# Patient Record
Sex: MALE | Race: WHITE | NOT HISPANIC OR LATINO | Employment: OTHER | URBAN - METROPOLITAN AREA
[De-identification: names, ages, dates, MRNs, and addresses within clinical notes are randomized per-mention and may not be internally consistent; named-entity substitution may affect disease eponyms.]

---

## 2018-01-01 ENCOUNTER — CLINICAL SUPPORT (OUTPATIENT)
Dept: RADIATION ONCOLOGY | Facility: CLINIC | Age: 63
End: 2018-01-01

## 2018-01-01 ENCOUNTER — RADIATION ONCOLOGY CONSULT (OUTPATIENT)
Dept: RADIATION ONCOLOGY | Facility: CLINIC | Age: 63
End: 2018-01-01
Attending: RADIOLOGY
Payer: COMMERCIAL

## 2018-01-01 ENCOUNTER — ANESTHESIA (INPATIENT)
Dept: GASTROENTEROLOGY | Facility: HOSPITAL | Age: 63
DRG: 392 | End: 2018-01-01
Payer: COMMERCIAL

## 2018-01-01 ENCOUNTER — HOSPITAL ENCOUNTER (OUTPATIENT)
Dept: RADIOLOGY | Facility: HOSPITAL | Age: 63
Discharge: HOME | End: 2018-12-17
Attending: RADIOLOGY

## 2018-01-01 ENCOUNTER — HOSPITAL ENCOUNTER (OUTPATIENT)
Dept: RADIOLOGY | Facility: HOSPITAL | Age: 63
Discharge: HOME | End: 2018-12-24
Attending: OTOLARYNGOLOGY
Payer: COMMERCIAL

## 2018-01-01 ENCOUNTER — TRANSCRIBE ORDERS (OUTPATIENT)
Dept: ADMINISTRATIVE | Facility: HOSPITAL | Age: 63
End: 2018-01-01

## 2018-01-01 ENCOUNTER — TELEPHONE (OUTPATIENT)
Dept: GASTROENTEROLOGY | Facility: CLINIC | Age: 63
End: 2018-01-01

## 2018-01-01 ENCOUNTER — ANESTHESIA EVENT (INPATIENT)
Dept: GASTROENTEROLOGY | Facility: HOSPITAL | Age: 63
DRG: 392 | End: 2018-01-01
Payer: COMMERCIAL

## 2018-01-01 ENCOUNTER — HOSPITAL ENCOUNTER (INPATIENT)
Facility: HOSPITAL | Age: 63
LOS: 3 days | Discharge: HOME | DRG: 392 | End: 2018-12-30
Attending: EMERGENCY MEDICINE | Admitting: INTERNAL MEDICINE
Payer: COMMERCIAL

## 2018-01-01 ENCOUNTER — HOSPITAL ENCOUNTER (OUTPATIENT)
Dept: RADIOLOGY | Facility: HOSPITAL | Age: 63
Discharge: HOME | End: 2018-12-24
Payer: COMMERCIAL

## 2018-01-01 ENCOUNTER — HOSPITAL ENCOUNTER (OUTPATIENT)
Dept: RADIOLOGY | Facility: HOSPITAL | Age: 63
Discharge: HOME | End: 2018-12-24
Admitting: RADIOLOGY
Payer: COMMERCIAL

## 2018-01-01 VITALS
WEIGHT: 251 LBS | TEMPERATURE: 98.1 F | DIASTOLIC BLOOD PRESSURE: 84 MMHG | SYSTOLIC BLOOD PRESSURE: 132 MMHG | HEIGHT: 70 IN | RESPIRATION RATE: 18 BRPM | OXYGEN SATURATION: 98 % | BODY MASS INDEX: 35.93 KG/M2 | HEART RATE: 74 BPM

## 2018-01-01 VITALS
SYSTOLIC BLOOD PRESSURE: 168 MMHG | HEIGHT: 70 IN | WEIGHT: 252.6 LBS | HEART RATE: 80 BPM | BODY MASS INDEX: 36.16 KG/M2 | RESPIRATION RATE: 18 BRPM | DIASTOLIC BLOOD PRESSURE: 79 MMHG | TEMPERATURE: 97.4 F | OXYGEN SATURATION: 98 %

## 2018-01-01 DIAGNOSIS — C73 PRIMARY CANCER OF THYROID WITH METASTASIS TO OTHER SITE (HCC): Primary | ICD-10-CM

## 2018-01-01 DIAGNOSIS — E04.2 NONTOXIC MULTINODULAR GOITER: ICD-10-CM

## 2018-01-01 DIAGNOSIS — R13.19 OTHER DYSPHAGIA: ICD-10-CM

## 2018-01-01 DIAGNOSIS — R13.10 DIFFICULTY IN SWALLOWING: ICD-10-CM

## 2018-01-01 DIAGNOSIS — E04.1 LEFT THYROID NODULE: ICD-10-CM

## 2018-01-01 DIAGNOSIS — E11.9 TYPE 2 DIABETES MELLITUS WITHOUT COMPLICATION, WITHOUT LONG-TERM CURRENT USE OF INSULIN (HCC): Chronic | ICD-10-CM

## 2018-01-01 DIAGNOSIS — E11.9 DIABETES (HCC): ICD-10-CM

## 2018-01-01 DIAGNOSIS — C73 THYROID CANCER (HCC): Primary | ICD-10-CM

## 2018-01-01 DIAGNOSIS — R59.0 BILATERAL HILAR ADENOPATHY SYNDROME: ICD-10-CM

## 2018-01-01 DIAGNOSIS — I10 HYPERTENSION: ICD-10-CM

## 2018-01-01 DIAGNOSIS — R59.0 ENLARGED SUBMENTAL LYMPH NODE: ICD-10-CM

## 2018-01-01 DIAGNOSIS — Z71.2 PERSON CONSULTING FOR EXPLANATION OF EXAMINATION OR TEST FINDING: ICD-10-CM

## 2018-01-01 DIAGNOSIS — I10 ESSENTIAL HYPERTENSION: Chronic | ICD-10-CM

## 2018-01-01 DIAGNOSIS — R13.12 OROPHARYNGEAL DYSPHAGIA: ICD-10-CM

## 2018-01-01 DIAGNOSIS — R59.0 BILATERAL HILAR ADENOPATHY SYNDROME: Primary | ICD-10-CM

## 2018-01-01 LAB
ABO GROUP BLD: NORMAL
ALBUMIN SERPL BCP-MCNC: 3.4 G/DL (ref 3.5–5)
ALBUMIN SERPL BCP-MCNC: 4.1 G/DL (ref 3.5–5)
ALP SERPL-CCNC: 104 U/L (ref 46–116)
ALP SERPL-CCNC: 91 U/L (ref 46–116)
ALT SERPL W P-5'-P-CCNC: 13 U/L (ref 12–78)
ALT SERPL W P-5'-P-CCNC: 18 U/L (ref 12–78)
ANION GAP SERPL CALCULATED.3IONS-SCNC: 10 MMOL/L (ref 4–13)
ANION GAP SERPL CALCULATED.3IONS-SCNC: 11 MMOL/L (ref 4–13)
ANION GAP SERPL CALCULATED.3IONS-SCNC: 12 MMOL/L (ref 4–13)
ANION GAP SERPL CALCULATED.3IONS-SCNC: 12 MMOL/L (ref 4–13)
AST SERPL W P-5'-P-CCNC: 14 U/L (ref 5–45)
AST SERPL W P-5'-P-CCNC: 18 U/L (ref 5–45)
BASOPHILS # BLD AUTO: 0.04 THOUSANDS/ΜL (ref 0–0.1)
BASOPHILS # BLD AUTO: 0.04 THOUSANDS/ΜL (ref 0–0.1)
BASOPHILS # BLD AUTO: 0.05 THOUSANDS/ΜL (ref 0–0.1)
BASOPHILS NFR BLD AUTO: 1 % (ref 0–1)
BILIRUB SERPL-MCNC: 0.88 MG/DL (ref 0.2–1)
BILIRUB SERPL-MCNC: 1.2 MG/DL (ref 0.2–1)
BLD GP AB SCN SERPL QL: NEGATIVE
BUN SERPL-MCNC: 11 MG/DL (ref 5–25)
BUN SERPL-MCNC: 12 MG/DL (ref 5–25)
BUN SERPL-MCNC: 12 MG/DL (ref 5–25)
BUN SERPL-MCNC: 14 MG/DL (ref 5–25)
CALCIUM SERPL-MCNC: 8.5 MG/DL (ref 8.3–10.1)
CALCIUM SERPL-MCNC: 8.6 MG/DL (ref 8.3–10.1)
CALCIUM SERPL-MCNC: 8.7 MG/DL (ref 8.3–10.1)
CALCIUM SERPL-MCNC: 9.1 MG/DL (ref 8.3–10.1)
CHLORIDE SERPL-SCNC: 100 MMOL/L (ref 100–108)
CHLORIDE SERPL-SCNC: 95 MMOL/L (ref 100–108)
CHLORIDE SERPL-SCNC: 98 MMOL/L (ref 100–108)
CHLORIDE SERPL-SCNC: 99 MMOL/L (ref 100–108)
CO2 SERPL-SCNC: 23 MMOL/L (ref 21–32)
CO2 SERPL-SCNC: 24 MMOL/L (ref 21–32)
CO2 SERPL-SCNC: 26 MMOL/L (ref 21–32)
CO2 SERPL-SCNC: 28 MMOL/L (ref 21–32)
CREAT SERPL-MCNC: 1.32 MG/DL (ref 0.6–1.3)
CREAT SERPL-MCNC: 1.37 MG/DL (ref 0.6–1.3)
CREAT SERPL-MCNC: 1.58 MG/DL (ref 0.6–1.3)
CREAT SERPL-MCNC: 1.61 MG/DL (ref 0.6–1.3)
EOSINOPHIL # BLD AUTO: 0.17 THOUSAND/ΜL (ref 0–0.61)
EOSINOPHIL # BLD AUTO: 0.18 THOUSAND/ΜL (ref 0–0.61)
EOSINOPHIL # BLD AUTO: 0.21 THOUSAND/ΜL (ref 0–0.61)
EOSINOPHIL NFR BLD AUTO: 2 % (ref 0–6)
EOSINOPHIL NFR BLD AUTO: 2 % (ref 0–6)
EOSINOPHIL NFR BLD AUTO: 3 % (ref 0–6)
ERYTHROCYTE [DISTWIDTH] IN BLOOD BY AUTOMATED COUNT: 12.3 % (ref 11.6–15.1)
ERYTHROCYTE [DISTWIDTH] IN BLOOD BY AUTOMATED COUNT: 12.5 % (ref 11.6–15.1)
ERYTHROCYTE [DISTWIDTH] IN BLOOD BY AUTOMATED COUNT: 12.6 % (ref 11.6–15.1)
EST. AVERAGE GLUCOSE BLD GHB EST-MCNC: 203 MG/DL
GFR SERPL CREATININE-BSD FRML MDRD: 45 ML/MIN/1.73SQ M
GFR SERPL CREATININE-BSD FRML MDRD: 46 ML/MIN/1.73SQ M
GFR SERPL CREATININE-BSD FRML MDRD: 55 ML/MIN/1.73SQ M
GFR SERPL CREATININE-BSD FRML MDRD: 57 ML/MIN/1.73SQ M
GLUCOSE SERPL-MCNC: 162 MG/DL (ref 65–140)
GLUCOSE SERPL-MCNC: 170 MG/DL (ref 65–140)
GLUCOSE SERPL-MCNC: 178 MG/DL (ref 65–140)
GLUCOSE SERPL-MCNC: 207 MG/DL (ref 65–140)
GLUCOSE SERPL-MCNC: 217 MG/DL (ref 65–140)
GLUCOSE SERPL-MCNC: 224 MG/DL (ref 65–140)
GLUCOSE SERPL-MCNC: 231 MG/DL (ref 65–140)
GLUCOSE SERPL-MCNC: 232 MG/DL (ref 65–140)
GLUCOSE SERPL-MCNC: 238 MG/DL (ref 65–140)
GLUCOSE SERPL-MCNC: 239 MG/DL (ref 65–140)
GLUCOSE SERPL-MCNC: 241 MG/DL (ref 65–140)
GLUCOSE SERPL-MCNC: 247 MG/DL (ref 65–140)
GLUCOSE SERPL-MCNC: 259 MG/DL (ref 65–140)
GLUCOSE SERPL-MCNC: 262 MG/DL (ref 65–140)
GLUCOSE SERPL-MCNC: 262 MG/DL (ref 65–140)
GLUCOSE SERPL-MCNC: 273 MG/DL (ref 65–140)
HBA1C MFR BLD: 8.7 % (ref 4.2–6.3)
HCT VFR BLD AUTO: 37.8 % (ref 36.5–49.3)
HCT VFR BLD AUTO: 38.1 % (ref 36.5–49.3)
HCT VFR BLD AUTO: 38.5 % (ref 36.5–49.3)
HGB BLD-MCNC: 13.4 G/DL (ref 12–17)
HGB BLD-MCNC: 13.5 G/DL (ref 12–17)
HGB BLD-MCNC: 13.6 G/DL (ref 12–17)
IMM GRANULOCYTES # BLD AUTO: 0.04 THOUSAND/UL (ref 0–0.2)
IMM GRANULOCYTES NFR BLD AUTO: 1 % (ref 0–2)
INR PPP: 1.1 (ref 0.86–1.17)
LIPASE SERPL-CCNC: 150 U/L (ref 73–393)
LYMPHOCYTES # BLD AUTO: 1.21 THOUSANDS/ΜL (ref 0.6–4.47)
LYMPHOCYTES # BLD AUTO: 1.33 THOUSANDS/ΜL (ref 0.6–4.47)
LYMPHOCYTES # BLD AUTO: 1.58 THOUSANDS/ΜL (ref 0.6–4.47)
LYMPHOCYTES NFR BLD AUTO: 16 % (ref 14–44)
LYMPHOCYTES NFR BLD AUTO: 17 % (ref 14–44)
LYMPHOCYTES NFR BLD AUTO: 21 % (ref 14–44)
MCH RBC QN AUTO: 31.6 PG (ref 26.8–34.3)
MCH RBC QN AUTO: 31.9 PG (ref 26.8–34.3)
MCH RBC QN AUTO: 31.9 PG (ref 26.8–34.3)
MCHC RBC AUTO-ENTMCNC: 35.3 G/DL (ref 31.4–37.4)
MCHC RBC AUTO-ENTMCNC: 35.4 G/DL (ref 31.4–37.4)
MCHC RBC AUTO-ENTMCNC: 35.4 G/DL (ref 31.4–37.4)
MCV RBC AUTO: 89 FL (ref 82–98)
MCV RBC AUTO: 90 FL (ref 82–98)
MCV RBC AUTO: 90 FL (ref 82–98)
MONOCYTES # BLD AUTO: 0.69 THOUSAND/ΜL (ref 0.17–1.22)
MONOCYTES # BLD AUTO: 0.69 THOUSAND/ΜL (ref 0.17–1.22)
MONOCYTES # BLD AUTO: 0.71 THOUSAND/ΜL (ref 0.17–1.22)
MONOCYTES NFR BLD AUTO: 9 % (ref 4–12)
NEUTROPHILS # BLD AUTO: 5.1 THOUSANDS/ΜL (ref 1.85–7.62)
NEUTROPHILS # BLD AUTO: 5.43 THOUSANDS/ΜL (ref 1.85–7.62)
NEUTROPHILS # BLD AUTO: 5.56 THOUSANDS/ΜL (ref 1.85–7.62)
NEUTS SEG NFR BLD AUTO: 65 % (ref 43–75)
NEUTS SEG NFR BLD AUTO: 70 % (ref 43–75)
NEUTS SEG NFR BLD AUTO: 71 % (ref 43–75)
NRBC BLD AUTO-RTO: 0 /100 WBCS
PLATELET # BLD AUTO: 213 THOUSANDS/UL (ref 149–390)
PLATELET # BLD AUTO: 214 THOUSANDS/UL (ref 149–390)
PLATELET # BLD AUTO: 230 THOUSANDS/UL (ref 149–390)
PMV BLD AUTO: 9.1 FL (ref 8.9–12.7)
PMV BLD AUTO: 9.2 FL (ref 8.9–12.7)
PMV BLD AUTO: 9.3 FL (ref 8.9–12.7)
POTASSIUM SERPL-SCNC: 3.8 MMOL/L (ref 3.5–5.3)
POTASSIUM SERPL-SCNC: 3.9 MMOL/L (ref 3.5–5.3)
PROT SERPL-MCNC: 7.7 G/DL (ref 6.4–8.2)
PROT SERPL-MCNC: 8.3 G/DL (ref 6.4–8.2)
PROTHROMBIN TIME: 14.3 SECONDS (ref 11.8–14.2)
RBC # BLD AUTO: 4.23 MILLION/UL (ref 3.88–5.62)
RBC # BLD AUTO: 4.24 MILLION/UL (ref 3.88–5.62)
RBC # BLD AUTO: 4.27 MILLION/UL (ref 3.88–5.62)
RH BLD: POSITIVE
SCAN RESULT: NORMAL
SODIUM SERPL-SCNC: 132 MMOL/L (ref 136–145)
SODIUM SERPL-SCNC: 134 MMOL/L (ref 136–145)
SODIUM SERPL-SCNC: 134 MMOL/L (ref 136–145)
SODIUM SERPL-SCNC: 138 MMOL/L (ref 136–145)
SPECIMEN EXPIRATION DATE: NORMAL
WBC # BLD AUTO: 7.69 THOUSAND/UL (ref 4.31–10.16)
WBC # BLD AUTO: 7.7 THOUSAND/UL (ref 4.31–10.16)
WBC # BLD AUTO: 7.72 THOUSAND/UL (ref 4.31–10.16)

## 2018-01-01 PROCEDURE — 88172 CYTP DX EVAL FNA 1ST EA SITE: CPT | Performed by: PATHOLOGY

## 2018-01-01 PROCEDURE — 86900 BLOOD TYPING SEROLOGIC ABO: CPT | Performed by: EMERGENCY MEDICINE

## 2018-01-01 PROCEDURE — 88342 IMHCHEM/IMCYTCHM 1ST ANTB: CPT | Performed by: PATHOLOGY

## 2018-01-01 PROCEDURE — 80053 COMPREHEN METABOLIC PANEL: CPT | Performed by: EMERGENCY MEDICINE

## 2018-01-01 PROCEDURE — 82948 REAGENT STRIP/BLOOD GLUCOSE: CPT

## 2018-01-01 PROCEDURE — 85025 COMPLETE CBC W/AUTO DIFF WBC: CPT | Performed by: INTERNAL MEDICINE

## 2018-01-01 PROCEDURE — 83036 HEMOGLOBIN GLYCOSYLATED A1C: CPT | Performed by: INTERNAL MEDICINE

## 2018-01-01 PROCEDURE — 10022 HB FNA W/IMAGE: CPT

## 2018-01-01 PROCEDURE — 88305 TISSUE EXAM BY PATHOLOGIST: CPT | Performed by: PATHOLOGY

## 2018-01-01 PROCEDURE — 85025 COMPLETE CBC W/AUTO DIFF WBC: CPT | Performed by: EMERGENCY MEDICINE

## 2018-01-01 PROCEDURE — 88341 IMHCHEM/IMCYTCHM EA ADD ANTB: CPT | Performed by: PATHOLOGY

## 2018-01-01 PROCEDURE — 99285 EMERGENCY DEPT VISIT HI MDM: CPT

## 2018-01-01 PROCEDURE — 99232 SBSQ HOSP IP/OBS MODERATE 35: CPT | Performed by: INTERNAL MEDICINE

## 2018-01-01 PROCEDURE — 76942 ECHO GUIDE FOR BIOPSY: CPT

## 2018-01-01 PROCEDURE — 88173 CYTOPATH EVAL FNA REPORT: CPT | Performed by: PATHOLOGY

## 2018-01-01 PROCEDURE — 80048 BASIC METABOLIC PNL TOTAL CA: CPT | Performed by: INTERNAL MEDICINE

## 2018-01-01 PROCEDURE — 70491 CT SOFT TISSUE NECK W/DYE: CPT

## 2018-01-01 PROCEDURE — 86850 RBC ANTIBODY SCREEN: CPT | Performed by: EMERGENCY MEDICINE

## 2018-01-01 PROCEDURE — 99215 OFFICE O/P EST HI 40 MIN: CPT | Performed by: RADIOLOGY

## 2018-01-01 PROCEDURE — 43246 EGD PLACE GASTROSTOMY TUBE: CPT | Performed by: INTERNAL MEDICINE

## 2018-01-01 PROCEDURE — 85610 PROTHROMBIN TIME: CPT | Performed by: INTERNAL MEDICINE

## 2018-01-01 PROCEDURE — 80053 COMPREHEN METABOLIC PANEL: CPT | Performed by: INTERNAL MEDICINE

## 2018-01-01 PROCEDURE — 36415 COLL VENOUS BLD VENIPUNCTURE: CPT | Performed by: EMERGENCY MEDICINE

## 2018-01-01 PROCEDURE — 83690 ASSAY OF LIPASE: CPT | Performed by: EMERGENCY MEDICINE

## 2018-01-01 PROCEDURE — 71260 CT THORAX DX C+: CPT

## 2018-01-01 PROCEDURE — 99223 1ST HOSP IP/OBS HIGH 75: CPT | Performed by: INTERNAL MEDICINE

## 2018-01-01 PROCEDURE — 88185 FLOWCYTOMETRY/TC ADD-ON: CPT

## 2018-01-01 PROCEDURE — 88184 FLOWCYTOMETRY/ TC 1 MARKER: CPT | Performed by: SPECIALIST

## 2018-01-01 PROCEDURE — 86901 BLOOD TYPING SEROLOGIC RH(D): CPT | Performed by: EMERGENCY MEDICINE

## 2018-01-01 PROCEDURE — 99239 HOSP IP/OBS DSCHRG MGMT >30: CPT | Performed by: INTERNAL MEDICINE

## 2018-01-01 PROCEDURE — 0DH63UZ INSERTION OF FEEDING DEVICE INTO STOMACH, PERCUTANEOUS APPROACH: ICD-10-PCS | Performed by: INTERNAL MEDICINE

## 2018-01-01 RX ORDER — REPAGLINIDE 2 MG/1
2 TABLET ORAL
Qty: 90 TABLET | Refills: 0 | Status: SHIPPED | OUTPATIENT
Start: 2018-01-01 | End: 2019-01-01 | Stop reason: HOSPADM

## 2018-01-01 RX ORDER — CEFAZOLIN SODIUM 1 G/50ML
1000 SOLUTION INTRAVENOUS ONCE
Status: DISCONTINUED | OUTPATIENT
Start: 2018-01-01 | End: 2018-01-01

## 2018-01-01 RX ORDER — MORPHINE SULFATE 4 MG/ML
4 INJECTION, SOLUTION INTRAMUSCULAR; INTRAVENOUS EVERY 4 HOURS PRN
Status: DISCONTINUED | OUTPATIENT
Start: 2018-01-01 | End: 2018-01-01 | Stop reason: HOSPADM

## 2018-01-01 RX ORDER — HYDRALAZINE HYDROCHLORIDE 20 MG/ML
10 INJECTION INTRAMUSCULAR; INTRAVENOUS EVERY 6 HOURS PRN
Status: DISCONTINUED | OUTPATIENT
Start: 2018-01-01 | End: 2018-01-01 | Stop reason: HOSPADM

## 2018-01-01 RX ORDER — HEPARIN SODIUM 5000 [USP'U]/ML
5000 INJECTION, SOLUTION INTRAVENOUS; SUBCUTANEOUS EVERY 8 HOURS SCHEDULED
Status: DISCONTINUED | OUTPATIENT
Start: 2018-01-01 | End: 2018-01-01 | Stop reason: HOSPADM

## 2018-01-01 RX ORDER — OMEPRAZOLE 20 MG/1
20 CAPSULE, DELAYED RELEASE ORAL DAILY
COMMUNITY
End: 2018-01-01

## 2018-01-01 RX ORDER — LIDOCAINE HYDROCHLORIDE 10 MG/ML
2 INJECTION, SOLUTION INFILTRATION; PERINEURAL ONCE
Status: COMPLETED | OUTPATIENT
Start: 2018-01-01 | End: 2018-01-01

## 2018-01-01 RX ORDER — METOPROLOL SUCCINATE 100 MG/1
100 TABLET, EXTENDED RELEASE ORAL
COMMUNITY
End: 2018-01-01

## 2018-01-01 RX ORDER — METOPROLOL TARTRATE 50 MG/1
50 TABLET, FILM COATED ORAL EVERY 8 HOURS SCHEDULED
Status: DISCONTINUED | OUTPATIENT
Start: 2018-01-01 | End: 2018-01-01

## 2018-01-01 RX ORDER — PROPOFOL 10 MG/ML
INJECTION, EMULSION INTRAVENOUS AS NEEDED
Status: DISCONTINUED | OUTPATIENT
Start: 2018-01-01 | End: 2018-01-01 | Stop reason: SURG

## 2018-01-01 RX ORDER — INSULIN GLARGINE 100 [IU]/ML
15 INJECTION, SOLUTION SUBCUTANEOUS EVERY MORNING
Status: DISCONTINUED | OUTPATIENT
Start: 2018-01-01 | End: 2018-01-01

## 2018-01-01 RX ORDER — OMEPRAZOLE 20 MG/1
20 CAPSULE, DELAYED RELEASE ORAL DAILY
COMMUNITY
End: 2019-01-01 | Stop reason: HOSPADM

## 2018-01-01 RX ORDER — REPAGLINIDE 2 MG/1
1 TABLET ORAL
Status: DISCONTINUED | OUTPATIENT
Start: 2018-01-01 | End: 2018-01-01

## 2018-01-01 RX ORDER — CLONIDINE 0.2 MG/24H
0.2 PATCH, EXTENDED RELEASE TRANSDERMAL WEEKLY
Status: DISCONTINUED | OUTPATIENT
Start: 2018-01-01 | End: 2018-01-01

## 2018-01-01 RX ORDER — METOPROLOL TARTRATE 5 MG/5ML
5 INJECTION INTRAVENOUS ONCE
Status: COMPLETED | OUTPATIENT
Start: 2018-01-01 | End: 2018-01-01

## 2018-01-01 RX ORDER — CEFAZOLIN SODIUM 1 G/50ML
1000 SOLUTION INTRAVENOUS ONCE
Status: COMPLETED | OUTPATIENT
Start: 2018-01-01 | End: 2018-01-01

## 2018-01-01 RX ORDER — AMLODIPINE BESYLATE AND BENAZEPRIL HYDROCHLORIDE 5; 20 MG/1; MG/1
1 CAPSULE ORAL 2 TIMES DAILY
COMMUNITY
End: 2019-01-01 | Stop reason: HOSPADM

## 2018-01-01 RX ORDER — PANTOPRAZOLE SODIUM 40 MG/1
40 TABLET, DELAYED RELEASE ORAL
Status: DISCONTINUED | OUTPATIENT
Start: 2018-01-01 | End: 2018-01-01 | Stop reason: HOSPADM

## 2018-01-01 RX ORDER — METOPROLOL SUCCINATE 100 MG/1
100 TABLET, EXTENDED RELEASE ORAL 2 TIMES DAILY
COMMUNITY
End: 2019-01-01 | Stop reason: HOSPADM

## 2018-01-01 RX ORDER — ASPIRIN 81 MG/1
81 TABLET ORAL DAILY
COMMUNITY
End: 2019-01-01 | Stop reason: HOSPADM

## 2018-01-01 RX ORDER — LISINOPRIL 20 MG/1
20 TABLET ORAL DAILY
Status: DISCONTINUED | OUTPATIENT
Start: 2018-01-01 | End: 2018-01-01 | Stop reason: HOSPADM

## 2018-01-01 RX ORDER — FENTANYL CITRATE/PF 50 MCG/ML
25 SYRINGE (ML) INJECTION
Status: DISCONTINUED | OUTPATIENT
Start: 2018-01-01 | End: 2018-01-01 | Stop reason: HOSPADM

## 2018-01-01 RX ORDER — REPAGLINIDE 2 MG/1
2 TABLET ORAL
Status: DISCONTINUED | OUTPATIENT
Start: 2018-01-01 | End: 2018-01-01 | Stop reason: HOSPADM

## 2018-01-01 RX ORDER — CEFAZOLIN SODIUM 2 G/50ML
2000 SOLUTION INTRAVENOUS ONCE
Status: DISCONTINUED | OUTPATIENT
Start: 2018-01-01 | End: 2018-01-01

## 2018-01-01 RX ORDER — SODIUM CHLORIDE 9 MG/ML
125 INJECTION, SOLUTION INTRAVENOUS CONTINUOUS
Status: CANCELLED | OUTPATIENT
Start: 2018-01-01

## 2018-01-01 RX ORDER — INSULIN GLARGINE 100 [IU]/ML
25 INJECTION, SOLUTION SUBCUTANEOUS EVERY MORNING
Status: DISCONTINUED | OUTPATIENT
Start: 2018-01-01 | End: 2018-01-01 | Stop reason: HOSPADM

## 2018-01-01 RX ORDER — CLONIDINE 0.3 MG/24H
0.3 PATCH, EXTENDED RELEASE TRANSDERMAL WEEKLY
Status: DISCONTINUED | OUTPATIENT
Start: 2018-01-01 | End: 2018-01-01 | Stop reason: HOSPADM

## 2018-01-01 RX ORDER — CHOLECALCIFEROL (VITAMIN D3) 125 MCG
500 CAPSULE ORAL DAILY
COMMUNITY
End: 2018-01-01

## 2018-01-01 RX ORDER — INSULIN GLARGINE 100 [IU]/ML
25 INJECTION, SOLUTION SUBCUTANEOUS EVERY MORNING
Qty: 10 ML | Refills: 0 | Status: SHIPPED | OUTPATIENT
Start: 2018-01-01 | End: 2019-01-01 | Stop reason: HOSPADM

## 2018-01-01 RX ORDER — CHOLECALCIFEROL (VITAMIN D3) 125 MCG
500 CAPSULE ORAL DAILY
COMMUNITY
End: 2019-01-01 | Stop reason: HOSPADM

## 2018-01-01 RX ORDER — INSULIN GLARGINE 100 [IU]/ML
20 INJECTION, SOLUTION SUBCUTANEOUS EVERY MORNING
Status: DISCONTINUED | OUTPATIENT
Start: 2018-01-01 | End: 2018-01-01

## 2018-01-01 RX ORDER — METOPROLOL TARTRATE 100 MG/1
100 TABLET ORAL EVERY 12 HOURS SCHEDULED
Status: DISCONTINUED | OUTPATIENT
Start: 2018-01-01 | End: 2018-01-01 | Stop reason: HOSPADM

## 2018-01-01 RX ORDER — CLONIDINE 0.3 MG/24H
1 PATCH, EXTENDED RELEASE TRANSDERMAL WEEKLY
Qty: 4 PATCH | Refills: 0 | Status: SHIPPED | OUTPATIENT
Start: 2018-01-01 | End: 2019-01-01 | Stop reason: HOSPADM

## 2018-01-01 RX ORDER — HYDRALAZINE HYDROCHLORIDE 20 MG/ML
INJECTION INTRAMUSCULAR; INTRAVENOUS AS NEEDED
Status: DISCONTINUED | OUTPATIENT
Start: 2018-01-01 | End: 2018-01-01 | Stop reason: SURG

## 2018-01-01 RX ORDER — CEFAZOLIN SODIUM 1 G/50ML
SOLUTION INTRAVENOUS AS NEEDED
Status: DISCONTINUED | OUTPATIENT
Start: 2018-01-01 | End: 2018-01-01 | Stop reason: SURG

## 2018-01-01 RX ORDER — METOPROLOL TARTRATE 5 MG/5ML
5 INJECTION INTRAVENOUS EVERY 6 HOURS
Status: DISCONTINUED | OUTPATIENT
Start: 2018-01-01 | End: 2018-01-01

## 2018-01-01 RX ORDER — SODIUM CHLORIDE 9 MG/ML
125 INJECTION, SOLUTION INTRAVENOUS CONTINUOUS
Status: DISCONTINUED | OUTPATIENT
Start: 2018-01-01 | End: 2018-01-01

## 2018-01-01 RX ORDER — GLIPIZIDE 10 MG/1
10 TABLET ORAL
COMMUNITY
End: 2018-01-01 | Stop reason: HOSPADM

## 2018-01-01 RX ORDER — SODIUM CHLORIDE AND POTASSIUM CHLORIDE .9; .15 G/100ML; G/100ML
75 SOLUTION INTRAVENOUS CONTINUOUS
Status: DISCONTINUED | OUTPATIENT
Start: 2018-01-01 | End: 2018-01-01

## 2018-01-01 RX ORDER — AMLODIPINE BESYLATE AND BENAZEPRIL HYDROCHLORIDE 10; 20 MG/1; MG/1
1 CAPSULE ORAL
COMMUNITY
End: 2018-01-01

## 2018-01-01 RX ORDER — GLIPIZIDE 10 MG/1
10 TABLET ORAL
COMMUNITY
End: 2018-01-01

## 2018-01-01 RX ADMIN — CEFAZOLIN SODIUM 1000 MG: 1 SOLUTION INTRAVENOUS at 16:08

## 2018-01-01 RX ADMIN — MORPHINE SULFATE 4 MG: 4 INJECTION, SOLUTION INTRAMUSCULAR; INTRAVENOUS at 17:53

## 2018-01-01 RX ADMIN — LIDOCAINE HYDROCHLORIDE 15 ML: 20 SOLUTION ORAL; TOPICAL at 17:52

## 2018-01-01 RX ADMIN — LIDOCAINE HYDROCHLORIDE 2 ML: 10 INJECTION, SOLUTION INFILTRATION; PERINEURAL at 10:23

## 2018-01-01 RX ADMIN — REPAGLINIDE 1 MG: 2 TABLET ORAL at 07:00

## 2018-01-01 RX ADMIN — FAMOTIDINE 20 MG: 10 INJECTION, SOLUTION INTRAVENOUS at 10:00

## 2018-01-01 RX ADMIN — IOHEXOL 100 ML: 350 INJECTION, SOLUTION INTRAVENOUS at 11:57

## 2018-01-01 RX ADMIN — SODIUM CHLORIDE 1000 ML: 0.9 INJECTION, SOLUTION INTRAVENOUS at 16:16

## 2018-01-01 RX ADMIN — METOPROLOL TARTRATE 5 MG: 5 INJECTION, SOLUTION INTRAVENOUS at 16:18

## 2018-01-01 RX ADMIN — METOPROLOL TARTRATE 5 MG: 5 INJECTION, SOLUTION INTRAVENOUS at 22:35

## 2018-01-01 RX ADMIN — HEPARIN SODIUM 5000 UNITS: 5000 INJECTION INTRAVENOUS; SUBCUTANEOUS at 05:36

## 2018-01-01 RX ADMIN — HYDRALAZINE HYDROCHLORIDE 10 MG: 20 INJECTION INTRAMUSCULAR; INTRAVENOUS at 18:41

## 2018-01-01 RX ADMIN — METOPROLOL TARTRATE 5 MG: 5 INJECTION, SOLUTION INTRAVENOUS at 15:57

## 2018-01-01 RX ADMIN — SODIUM CHLORIDE: 0.9 INJECTION, SOLUTION INTRAVENOUS at 15:52

## 2018-01-01 RX ADMIN — METOPROLOL TARTRATE 5 MG: 5 INJECTION, SOLUTION INTRAVENOUS at 05:13

## 2018-01-01 RX ADMIN — SITAGLIPTIN 100 MG: 50 TABLET, FILM COATED ORAL at 10:10

## 2018-01-01 RX ADMIN — FAMOTIDINE 20 MG: 10 INJECTION, SOLUTION INTRAVENOUS at 08:10

## 2018-01-01 RX ADMIN — CLONIDINE 0.2 MG: 0.2 PATCH TRANSDERMAL at 11:14

## 2018-01-01 RX ADMIN — METOPROLOL TARTRATE 50 MG: 50 TABLET, FILM COATED ORAL at 21:19

## 2018-01-01 RX ADMIN — HYDRALAZINE HYDROCHLORIDE 5 MG: 20 INJECTION INTRAMUSCULAR; INTRAVENOUS at 16:10

## 2018-01-01 RX ADMIN — INSULIN LISPRO 1 UNITS: 100 INJECTION, SOLUTION INTRAVENOUS; SUBCUTANEOUS at 23:32

## 2018-01-01 RX ADMIN — CEFAZOLIN SODIUM 1000 MG: 1 SOLUTION INTRAVENOUS at 14:35

## 2018-01-01 RX ADMIN — HEPARIN SODIUM 5000 UNITS: 5000 INJECTION INTRAVENOUS; SUBCUTANEOUS at 13:51

## 2018-01-01 RX ADMIN — INSULIN LISPRO 3 UNITS: 100 INJECTION, SOLUTION INTRAVENOUS; SUBCUTANEOUS at 06:26

## 2018-01-01 RX ADMIN — INSULIN LISPRO 1 UNITS: 100 INJECTION, SOLUTION INTRAVENOUS; SUBCUTANEOUS at 18:36

## 2018-01-01 RX ADMIN — HYDRALAZINE HYDROCHLORIDE 10 MG: 20 INJECTION INTRAMUSCULAR; INTRAVENOUS at 16:20

## 2018-01-01 RX ADMIN — INSULIN GLARGINE 15 UNITS: 100 INJECTION, SOLUTION SUBCUTANEOUS at 11:14

## 2018-01-01 RX ADMIN — INSULIN LISPRO 3 UNITS: 100 INJECTION, SOLUTION INTRAVENOUS; SUBCUTANEOUS at 18:44

## 2018-01-01 RX ADMIN — INSULIN LISPRO 3 UNITS: 100 INJECTION, SOLUTION INTRAVENOUS; SUBCUTANEOUS at 23:36

## 2018-01-01 RX ADMIN — CEFAZOLIN SODIUM 1000 MG: 1 SOLUTION INTRAVENOUS at 17:53

## 2018-01-01 RX ADMIN — REPAGLINIDE 1 MG: 2 TABLET ORAL at 16:17

## 2018-01-01 RX ADMIN — LISINOPRIL 20 MG: 20 TABLET ORAL at 08:09

## 2018-01-01 RX ADMIN — FAMOTIDINE 20 MG: 10 INJECTION, SOLUTION INTRAVENOUS at 18:36

## 2018-01-01 RX ADMIN — SODIUM CHLORIDE AND POTASSIUM CHLORIDE 75 ML/HR: .9; .15 SOLUTION INTRAVENOUS at 18:14

## 2018-01-01 RX ADMIN — SODIUM CHLORIDE AND POTASSIUM CHLORIDE 75 ML/HR: .9; .15 SOLUTION INTRAVENOUS at 08:24

## 2018-01-01 RX ADMIN — HYDRALAZINE HYDROCHLORIDE 5 MG: 20 INJECTION INTRAMUSCULAR; INTRAVENOUS at 16:01

## 2018-01-01 RX ADMIN — MORPHINE SULFATE 2 MG: 2 INJECTION, SOLUTION INTRAMUSCULAR; INTRAVENOUS at 22:49

## 2018-01-01 RX ADMIN — LISINOPRIL 20 MG: 20 TABLET ORAL at 10:10

## 2018-01-01 RX ADMIN — CLONIDINE 0.3 MG: 0.3 PATCH TRANSDERMAL at 11:25

## 2018-01-01 RX ADMIN — LIDOCAINE HYDROCHLORIDE 50 MG: 20 INJECTION, SOLUTION INTRAVENOUS at 16:12

## 2018-01-01 RX ADMIN — METOPROLOL TARTRATE 5 MG: 5 INJECTION, SOLUTION INTRAVENOUS at 22:25

## 2018-01-01 RX ADMIN — HEPARIN SODIUM 5000 UNITS: 5000 INJECTION INTRAVENOUS; SUBCUTANEOUS at 06:26

## 2018-01-01 RX ADMIN — INSULIN LISPRO 3 UNITS: 100 INJECTION, SOLUTION INTRAVENOUS; SUBCUTANEOUS at 23:05

## 2018-01-01 RX ADMIN — FAMOTIDINE 20 MG: 10 INJECTION, SOLUTION INTRAVENOUS at 17:53

## 2018-01-01 RX ADMIN — REPAGLINIDE 1 MG: 2 TABLET ORAL at 13:51

## 2018-01-01 RX ADMIN — PROPOFOL 200 MG: 10 INJECTION, EMULSION INTRAVENOUS at 16:12

## 2018-01-01 RX ADMIN — REPAGLINIDE 2 MG: 2 TABLET ORAL at 11:26

## 2018-01-01 RX ADMIN — PROPOFOL 30 MG: 10 INJECTION, EMULSION INTRAVENOUS at 16:15

## 2018-01-01 RX ADMIN — HYDRALAZINE HYDROCHLORIDE 10 MG: 20 INJECTION INTRAMUSCULAR; INTRAVENOUS at 01:22

## 2018-01-01 RX ADMIN — METOPROLOL TARTRATE 5 MG: 5 INJECTION, SOLUTION INTRAVENOUS at 17:14

## 2018-01-01 RX ADMIN — HYDRALAZINE HYDROCHLORIDE 10 MG: 20 INJECTION INTRAMUSCULAR; INTRAVENOUS at 16:16

## 2018-01-01 RX ADMIN — METOPROLOL TARTRATE 5 MG: 5 INJECTION, SOLUTION INTRAVENOUS at 11:13

## 2018-01-01 RX ADMIN — INSULIN LISPRO 3 UNITS: 100 INJECTION, SOLUTION INTRAVENOUS; SUBCUTANEOUS at 05:36

## 2018-01-01 RX ADMIN — PROPOFOL 50 MG: 10 INJECTION, EMULSION INTRAVENOUS at 16:19

## 2018-01-01 RX ADMIN — FAMOTIDINE 20 MG: 10 INJECTION, SOLUTION INTRAVENOUS at 17:14

## 2018-01-01 RX ADMIN — INSULIN GLARGINE 20 UNITS: 100 INJECTION, SOLUTION SUBCUTANEOUS at 08:15

## 2018-01-01 RX ADMIN — INSULIN LISPRO 3 UNITS: 100 INJECTION, SOLUTION INTRAVENOUS; SUBCUTANEOUS at 06:39

## 2018-01-01 RX ADMIN — INSULIN LISPRO 3 UNITS: 100 INJECTION, SOLUTION INTRAVENOUS; SUBCUTANEOUS at 17:54

## 2018-01-01 RX ADMIN — SITAGLIPTIN 100 MG: 50 TABLET, FILM COATED ORAL at 08:09

## 2018-01-01 RX ADMIN — METOPROLOL TARTRATE 50 MG: 50 TABLET, FILM COATED ORAL at 05:36

## 2018-01-01 RX ADMIN — HEPARIN SODIUM 5000 UNITS: 5000 INJECTION INTRAVENOUS; SUBCUTANEOUS at 21:19

## 2018-01-01 RX ADMIN — HEPARIN SODIUM 5000 UNITS: 5000 INJECTION INTRAVENOUS; SUBCUTANEOUS at 22:36

## 2018-01-01 RX ADMIN — METOPROLOL TARTRATE 5 MG: 5 INJECTION, SOLUTION INTRAVENOUS at 04:27

## 2018-01-01 RX ADMIN — INSULIN LISPRO 2 UNITS: 100 INJECTION, SOLUTION INTRAVENOUS; SUBCUTANEOUS at 13:51

## 2018-01-01 RX ADMIN — FAMOTIDINE 20 MG: 10 INJECTION, SOLUTION INTRAVENOUS at 08:24

## 2018-12-27 PROBLEM — C73 PRIMARY CANCER OF THYROID WITH METASTASIS TO OTHER SITE (HCC): Status: ACTIVE | Noted: 2018-01-01

## 2018-12-27 PROBLEM — E11.9 TYPE 2 DIABETES MELLITUS WITHOUT COMPLICATION, WITHOUT LONG-TERM CURRENT USE OF INSULIN (HCC): Chronic | Status: ACTIVE | Noted: 2018-01-01

## 2018-12-27 PROBLEM — R13.10 DIFFICULTY IN SWALLOWING: Status: ACTIVE | Noted: 2018-01-01

## 2018-12-27 PROBLEM — R13.12 OROPHARYNGEAL DYSPHAGIA: Status: ACTIVE | Noted: 2018-01-01

## 2018-12-27 PROBLEM — K21.9 GASTROESOPHAGEAL REFLUX DISEASE WITHOUT ESOPHAGITIS: Chronic | Status: ACTIVE | Noted: 2018-01-01

## 2018-12-27 PROBLEM — E66.01 MORBID OBESITY DUE TO EXCESS CALORIES (HCC): Chronic | Status: ACTIVE | Noted: 2018-01-01

## 2018-12-27 PROBLEM — I10 ESSENTIAL HYPERTENSION: Chronic | Status: ACTIVE | Noted: 2018-01-01

## 2018-12-27 PROBLEM — C73 THYROID CANCER (HCC): Status: ACTIVE | Noted: 2018-01-01

## 2018-12-27 NOTE — TELEPHONE ENCOUNTER
Spoke with the Dr Tomasa Maldonado pt is having dysphagia  Unable to swallow pills  He may have tyroid CA w/ mets, bx pending  Asking about feeding tube  I recommended going to ER as this would be difficult to coordinate as outpt  Also he will need EGD & possible EUS  He stated he would call pt  Most likely going to [facility]

## 2018-12-27 NOTE — H&P
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  He was seen at ENT office today described unable to swallow anything thicker than water (progressing several weeks)  He had been able to eat soft foods Jell-O,  soups that this is becoming more difficulty  Today on able to swallow his pills or anything other than water  Patient was recently hunting felt pull in his neck and workup revealed the thyroid cancer  Review of Systems   Constitutional: Positive for activity change, appetite change and unexpected weight change  HENT: Positive for trouble swallowing  Eyes: Negative  Respiratory: Negative  Cardiovascular: Negative  Gastrointestinal: Positive for abdominal distention  Endocrine: Negative  Genitourinary: Negative  Musculoskeletal: Negative  Skin: Negative  Allergic/Immunologic: Negative  Neurological: Negative  Hematological: Negative  Psychiatric/Behavioral: Negative  Historical Information   Past Medical History:   Diagnosis Date    Cancer ([facility] )     Diabetes mellitus ([facility] )     GERD (gastroesophageal reflux disease)     Hypertension      Past Surgical History:   Procedure Laterality Date    HERNIA REPAIR      US GUIDED LYMPH NODE BIOPSY LEFT  12/24/2018    US GUIDED THYROID BIOPSY  12/24/2018     Social History   History   Alcohol Use No     History   Drug Use No     History   Smoking Status    Former Smoker   Smokeless Tobacco    Never Used     Comment: Former chewing tobacco use     History reviewed  No pertinent family history      Meds/Allergies     (Not in a hospital admission)  No Known Allergies    Objective   Vitals: Blood pressure (!) 196/97, pulse 98, temperature 97 9 °F (36 6 °C), temperature source Temporal, resp  rate 18, weight 115 kg (252 lb 9 6 oz), SpO2 96 %  No intake or output data in the 24 hours ending 12/27/18 1642    Invasive Devices     Peripheral Intravenous Line            Peripheral IV Right Antecubital -- days                Physical Exam   Constitutional: He is oriented to person, place, and time  He appears well-developed and well-nourished  HENT:   Head: Normocephalic and atraumatic  Eyes: Pupils are equal, round, and reactive to light  Conjunctivae and EOM are normal    Neck: Normal range of motion  Neck supple  Cardiovascular: Normal rate and regular rhythm  Pulmonary/Chest: Effort normal and breath sounds normal    Abdominal: He exhibits distension  There is no tenderness  Musculoskeletal: Normal range of motion  Neurological: He is alert and oriented to person, place, and time  Skin: Skin is warm and dry  Psychiatric: He has a normal mood and affect   His behavior is normal  Judgment and thought content normal        Lab Results:   Admission on 12/27/2018   Component Date Value    WBC 12/27/2018 7 70     RBC 12/27/2018 4 24     Hemoglobin 12/27/2018 13 4     Hematocrit 12/27/2018 37 8     MCV 12/27/2018 89     MCH 12/27/2018 31 6     MCHC 12/27/2018 35 4     RDW 12/27/2018 12 3     MPV 12/27/2018 9 2     Platelets 64/97/9093 214     nRBC 12/27/2018 0     Neutrophils Relative 12/27/2018 70     Immat GRANS % 12/27/2018 1     Lymphocytes Relative 12/27/2018 17     Monocytes Relative 12/27/2018 9     Eosinophils Relative 12/27/2018 2     Basophils Relative 12/27/2018 1     Neutrophils Absolute 12/27/2018 5 43     Immature Grans Absolute 12/27/2018 0 04     Lymphocytes Absolute 12/27/2018 1 33     Monocytes Absolute 12/27/2018 0 69     Eosinophils Absolute 12/27/2018 0 17     Basophils Absolute 12/27/2018 0 04      Imaging: Ct Soft Tissue Neck W Contrast    Result Date: 12/26/2018  Narrative: CT NECK WITH CONTRAST INDICATION:   R59 0: Localized enlarged lymph nodes E04 2: Nontoxic multinodular goiter  COMPARISON:  No previous CT imaging of the neck  Previous ultrasound imaging dated 12/24/2018  TECHNIQUE:  Axial, sagittal, and coronal 2D reformatted images were created from the axial source data and submitted for interpretation  Radiation dose length product (DLP) for this visit:  741 mGy-cm   This examination, like all CT scans performed in the [facility], was performed utilizing techniques to minimize radiation dose exposure, including the use of iterative reconstruction and automated exposure control  IV Contrast:  100 mL of iohexol (OMNIPAQUE) IMAGE QUALITY:  Diagnostic  FINDINGS: VISUALIZED BRAIN PARENCHYMA:  No acute intracranial pathology of the visualized brain parenchyma  VISUALIZED ORBITS AND PARANASAL SINUSES:  Normal  NASAL CAVITY AND NASOPHARYNX:  Normal  THYROID GLAND:  Heterogeneous mass identified within the left aspect of the thyroid gland  The mass extends posteriorly and is inseparable from a thickened upper esophagus  The thickened upper esophagus extends inferiorly below the level of the thyroid  mass into the upper mediastinum  The right lobe of the thyroid gland is heterogeneous as well and there is mild thickening of the isthmus  SUPRAHYOID NECK:  Normal oral cavity, tongue base, tonsillar fossa and epiglottis  INFRAHYOID NECK:  Normal aryepiglottic folds and piriform sinuses  Unremarkable glottis  The above described left-sided thyroid mass displaces the subglottic trachea towards the right and results in mild narrowing  PAROTID AND SUBMANDIBULAR GLANDS:  Normal  LYMPH NODES:  Slightly heterogeneously enhancing node within the left level 2 distribution, series 3 image 56 demonstrates slightly indistinct margins and measures 1 3 x 1 9 cm    Additional abnormal lymph nodes are noted demonstrating heterogeneous enhancement including a supraclavicular heterogeneously enhancing node measuring 1 7 x 1 3 cm  Abnormal nodes are primarily left-sided  There are multiple small right-sided nodes which are not enlarged by CT criteria  There are nodules identified within the thoracic inlet inseparable from the undersurface of the left thyroid mass suggesting abnormal nodes  VASCULAR STRUCTURES:  Mild displacement of the common carotid arteries by the thyroid mass described above  Normal enhancement of the vasculature without stenosis  THORACIC INLET:  There is a pleural-based mass within the left lateral apex measuring approximately 2 8 x 1 6 cm with adjacent interstitial thickening  Pulmonary nodule noted slightly more inferiorly within the left upper lobe on image 107 measures 6 mm  Ill-defined nodular density within the medial left upper lobe measures 9 mm in long axis  Additional pulmonary nodule noted within the left superior segment of the lower lobe on image 127 measuring 12 mm  Partially visualized nodule within the posterior right upper lobe on image 129   4 mm right upper lobe pulmonary nodule on image 113  See separate CT chest report  BONY STRUCTURES: Within the C7 vertebral body inferiorly there is a focal lucency with no sclerotic border  This is nonspecific and may represent atypical hemangioma  However, osseous metastatic lesion is not excluded  Impression: Diffusely heterogeneous thyroid gland significantly enlarged on the left  This mass is inseparable from the upper esophagus which is diffusely thickened  The thickened esophagus extends inferiorly into the upper mediastinum  Heterogeneously enhancing pathologic adenopathy is noted primarily within the left neck but also in the upper mediastinum immediately below the left thyroid mass  Multiple pulmonary nodules are noted within the upper lobes, left greater than right as well as a pleural-based mass identified within the left apex  See separate CT chest report   Findings suggest thyroid carcinoma with possible direct extension to the upper esophagus, and pulmonary metastasis  Patient recently underwent thyroid and lymph node biopsies 12/24/2018  The study was marked in [facility] for immediate notification  Workstation performed: [id number]     Ct Chest W Contrast    Result Date: 12/26/2018  Narrative: CT CHEST WITH IV CONTRAST INDICATION:   R59 0: Localized enlarged lymph nodes  59-year-old man with enlarged cervical lymph nodes COMPARISON:  None  TECHNIQUE: CT examination of the chest was performed  Axial, sagittal, and coronal 2D reformatted images were created from the source data and submitted for interpretation  Radiation dose length product (DLP) for this visit:  976 mGy-cm   This examination, like all CT scans performed in the [facility], was performed utilizing techniques to minimize radiation dose exposure, including the use of iterative reconstruction and automated exposure control  IV Contrast:  100 mL of iohexol (OMNIPAQUE) FINDINGS: LUNGS:  There are numerous bilateral pulmonary nodules suspicious for metastatic disease  0 6 cm left upper lobe pulmonary nodule (series 302 image 70)  0 7 cm right lower lobe pulmonary nodule small central area of cavitation (series 302 image 94)  1 1 x 1 0 cm nodule superior segment left lower lobe (series 302 image 50) The left upper lobe tree-in-bud opacities, which may be infectious or inflammatory, immediately subjacent to a pleural-based mass  There is no tracheal or endobronchial lesion  However, there is moderate narrowing of the superior trachea to 2 left thyroid mass with the luminal diameter measuring 1 4 x 0 6 cm  PLEURA:  3 4 x 0 9 cm left apical pleural-based mass (series 604 image 124)  HEART/GREAT VESSELS:  Unremarkable for patient's age  MEDIASTINUM AND ADRI:  1 1 cm left paratracheal lymph node (series 4 image 12)  CHEST WALL AND LOWER NECK:   Left thyroid mass is incompletely included  This was recently biopsied   VISUALIZED STRUCTURES IN THE UPPER ABDOMEN:  Mild hepatic steatosis  Left kidney upper pole cyst  OSSEOUS STRUCTURES:  No acute fracture or destructive osseous lesion  Impression: 1  Heterogeneous left thyroid mass, which was recently biopsied  2   Findings worrisome for pulmonary and likely left pleural metastatic disease  3   Left upper lobe tree-in-bud opacities adjacent to pleural-based mass may be infectious or inflammatory  4   Mildly enlarged left paratracheal lymph node may additionally be metastatic  I personally discussed this study with Dr Boris Caldera on 12/26/2018 at 11:03 AM  Workstation performed: [id number]     Us Guided Lymph Node Biopsy Left    Result Date: 12/24/2018  Narrative: ULTRASOUND-GUIDED NECK LYMPH NODE BIOPSY HISTORY: 59year-old with history of recent ultrasound demonstrating left anterior jugular lymph node  Patient presents with prescription for biopsy of left anterior jugular lymph node  COMPARISON: Outside ultrasound December 12, 2018  FINDINGS: The procedure was explained to the patient, including risks of hemorrhage, infection and local injury  Informed consent was freely obtained  The patient verbalized expressed understanding of the above risks and wished to proceed with the procedure  Final standard "time-out" procedure performed  PROCEDURE: The neck was prepped and draped in normal sterile fashion  Under real-time ultrasound guidance and local anesthesia 6 passes with a 25 gauge needle were made through the left anterior jugular lymph node measuring today 2 4 x 1 3 x 1 4 cm  Cytopathology was present and deemed the specimens adequate for evaluation  The patient tolerated the procedure well  Postprocedure instructions were provided for the patient  I asked the patient to call us with any questions, concerns, or acute problems  The patient expressed understanding of the above  Impression: Status post successful ultrasound-guided biopsy of left anterior jugular chain lymph node   Procedure was performed by LOKI Richardson PA-C under the direct supervision of Dr Brook Eisenmenger  Workstation performed: [id number]     Us Guided Thyroid Biopsy With Affirma    Result Date: 12/24/2018  Narrative: ULTRASOUND-GUIDED THYROID BIOPSY HISTORY: 59year-old with history of thyroid nodules, seen on a prior outside hospital ultrasound study  Patient presents with prescription for biopsy of left thyroid nodule(s) with Afrima testing  COMPARISON: Outside images December 12, 2018 FINDINGS: Prior ultrasound images were reviewed  The dominant left thyroid nodule was targeted for today's biopsy  In review of prior imaging from outside facilities as well as our recent imaging prior to procedure, the "2 separate nodules" described on the outside hospital ultrasound were favored to be part of a large dominant left thyroid lobe nodule  The procedure was explained to the patient, including risks of hemorrhage, infection and local injury  Informed consent was freely obtained  Final standard "time-out" procedure performed  PROCEDURE: The neck was prepped and draped in normal sterile fashion  Under real-time ultrasound guidance and local anesthesia 5 passes with a 25 gauge needle were made through the dominant left thyroid nodule measuring today 2 6 x 2 6 x 2 6 cm  Both areas of the nodule described as "separate nodules" on the outside hospital were sampled  Cytopathology was present and deemed the specimens adequate for evaluation  The patient tolerated the procedure well  Postprocedure instructions were provided for the patient  Impression: Status post successful ultrasound-guided thyroid biopsy  Procedure was performed by LOKI Richardson PA-C under the direct supervision of Dr Brook Eisenmenger  Workstation performed: [id number]     EKG, Pathology, and Other Studies: I have personally reviewed pertinent reports  Assessment/Plan     Dysphagia   possibly related to thyroid mass increasing on upper esophagus    CT reveals thickened esophagus extending inferiorly into the upper mediastinum  Will place NPO consult GI for possible EGD and PEG tube placement  Will also consult Ear Nose and Throat    Thyroid carcinoma with metastasis will consult Ear Nose and Throat patient had reached biopsies undifferentiated carcinoma, discussed with ENT treatment plan and involvement of Oncology    Diabetes   will place NPO with Accu-Cheks    Hypertension   IV beta-blocker the p r n   Hydralazine and monitor    Morbid obesity   benefit from weight loss    GERD    provide IV Pepcid    ELBERT    the pre renal with decreased intake the provide IV fluids and monitor      Expect greater than 2 midnight stay  Patient was seen accompanied with his son

## 2018-12-27 NOTE — CONSULTS
Consultation -  [facility]  Pedro Howard 59 y o  male MRN: [id number]  Unit/Bed#: ED 32 Encounter: [id number]        Inpatient consult to gastroenterology  Consult performed by: Rafi Ferro  Consult ordered by: Dorothea Welch          Reason for Consult / Principal Problem: dysphagia    HPI: 81-year-old male with history of diabetes mellitus, hypertension, GERD and recently diagnosed thyroid cancer presenting to GI for evaluation of dysphagia  He underwent thyroid biopsy on 12/24 which was significant for undifferentiated carcinoma  CT chest concerning for pulmonary and likely left pleural metastatic disease  CT soft tissue neck shows the thyroid mass is inseparable from the upper esophagus which is diffusely thickened and extends inferiorly into the upper mediastinum  Patient reports dysphagia for solid food lately  He has only been able to tolerate soft foods and liquids like jello and soup  This morning, patient could not swallow his medication  He reports some odynophagia for dry foods like cookies  He reports about 4 pounds of weight loss recently  He does have chronic GERD and has taken Prilosec for many years  He denies nausea, vomiting, abdominal pain, changes in bowel habits  Dr Yohana Gallo with ENT called the GI office today regarding possibility of feeding tube placement  GI recommended patient come to the ED to expedite evaluation  Surgical history is significant for umbilical hernia repair with mesh  He denies tobacco and alchohol use  He denies past EGD and colonoscopy  REVIEW OF SYSTEMS:    CONSTITUTIONAL: Denies any fever, chills  Good appetite, and no recent weight loss  HEENT: No earache or tinnitus  Denies hearing loss or visual disturbances  CARDIOVASCULAR: No chest pain or palpitations  RESPIRATORY: Denies any cough, hemoptysis, shortness of breath or dyspnea on exertion  GASTROINTESTINAL: As noted in the History of Present Illness     GENITOURINARY: No problems with urination  Denies any hematuria or dysuria  NEUROLOGIC: No dizziness or vertigo, denies headaches  MUSCULOSKELETAL: Denies any muscle or joint pain  SKIN: Denies skin rashes or itching  ENDOCRINE: Denies excessive thirst  Denies intolerance to heat or cold  PSYCHOSOCIAL: Denies depression or anxiety  Denies any recent memory loss  Historical Information   Past Medical History:   Diagnosis Date    Cancer ([facility] )     Diabetes mellitus ([place] [address] )     GERD (gastroesophageal reflux disease)     Hypertension      Past Surgical History:   Procedure Laterality Date    HERNIA REPAIR      US GUIDED LYMPH NODE BIOPSY LEFT  12/24/2018    US GUIDED THYROID BIOPSY  12/24/2018     Social History   History   Alcohol Use No     History   Drug Use No     History   Smoking Status    Former Smoker   Smokeless Tobacco    Never Used     Comment: Former chewing tobacco use     History reviewed  No pertinent family history  Meds/Allergies       (Not in a hospital admission)  Current Facility-Administered Medications   Medication Dose Route Frequency    sodium chloride 0 9 % bolus 1,000 mL  1,000 mL Intravenous Once       No Known Allergies        Objective     Blood pressure (!) 196/97, pulse 98, temperature 97 9 °F (36 6 °C), temperature source Temporal, resp  rate 18, weight 115 kg (252 lb 9 6 oz), SpO2 96 %  No intake or output data in the 24 hours ending 12/27/18 9493      PHYSICAL EXAM:      General Appearance:   Alert, cooperative, no distress, appears stated age    HEENT:   Normocephalic, atraumatic, anicteric      Neck:  Supple, symmetrical, trachea midline   Lungs:   Clear to auscultation bilaterally; no rales, rhonchi or wheezing; respirations unlabored    Heart[de-identified]   S1 and S2 normal; regular rate and rhythm; no murmur, rub, or gallop     Abdomen:   Soft, non-tender, non-distended; normal bowel sounds; no masses, no organomegaly    Genitalia:   Deferred    Rectal:   Deferred    Extremities:  No cyanosis, clubbing or edema    Pulses:  2+ and symmetric all extremities    Skin:  Skin color, texture, turgor normal, no rashes or lesions    Lymph nodes:  No palpable cervical lymphadenopathy        Lab Results:             Invalid input(s): LABALBU            Imaging Studies: I have personally reviewed pertinent imaging studies  No results found  ASSESSMENT and PLAN:      44-year-old male with history of diabetes mellitus, hypertension, GERD and recently diagnosed thyroid cancer presenting to GI for evaluation of dysphagia  1) Dysphagia: Patient recently diagnosed with likely metastatic thyroid cancer  CT soft tissue neck shows the thyroid mass is inseparable from the upper esophagus which is diffusely thickened and extends inferiorly into the upper mediastinum  There is concern the mass is compressing the upper esophagus causing dysphagia  He has only been tolerating liquids and now cannot take medication by mouth     -Discussed EGD with PEG placement with patient and son at bedside, they are both agreeable to the procedure  -Keep NPO and plan for PEG placement tomorrow    2) Thyroid cancer: New diagnosis    -Appreciate input from oncology    Patient was seen and examined by Dr Katelyn Johnson  All [facility] medical decisions were made by Dr Katelyn Johnson  Thank you for allowing us to participate in the care of this present patient  We will follow-up with you closely

## 2018-12-27 NOTE — ED PROVIDER NOTES
History  Chief Complaint   Patient presents with    Difficulty Swallowing     Patient reports increased difficulty swallowing, recently had a biopsy and found out that he has "aggressive throat cancer"  Reports this morning he was unable to swallow his pills  Tolerating fluids, denies N/V, denies difficulty breathing, speaking in full sentences  41-year-old male presents for evaluation of difficulty swallowing  The patient was sent from Dr Vineet Erazo under  office of ear nose and throat  The patient is being evaluated for a thyroid cancer  I discussed case with Dr Emry Kocher or prior to the patient's arrival   The patient has had a rapidly progressing metastatic thyroid cancer  The patient presented to the office today because he was unable to swallow his pills  Patient denies any trouble handling secretions  He denies any respiratory distress  The patient was sent to the hospital for further evaluation and treatment of the cancer including a PEG tube and possible open PEG  History provided by:  Patient      None       Past Medical History:   Diagnosis Date    [facility] )     Diabetes mellitus ([facility] )     GERD (gastroesophageal reflux disease)     Hypertension        Past Surgical History:   Procedure Laterality Date    HERNIA REPAIR      US GUIDED LYMPH NODE BIOPSY LEFT  12/24/2018    US GUIDED THYROID BIOPSY  12/24/2018       History reviewed  No pertinent family history  I have reviewed and agree with the history as documented  Social History   Substance Use Topics    Smoking status: Former Smoker    Smokeless tobacco: Never Used      Comment: Former chewing tobacco use    Alcohol use No        Review of Systems   Constitutional: Negative for chills and fever  HENT: Positive for trouble swallowing  Negative for voice change  Respiratory: Negative for chest tightness, shortness of breath, wheezing and stridor  Cardiovascular: Negative for chest pain     Gastrointestinal: Negative for abdominal distention and abdominal pain  All other systems reviewed and are negative  Physical Exam  Physical Exam   Constitutional: He is oriented to person, place, and time  He appears well-developed and well-nourished  No distress  HENT:   Head: Normocephalic and atraumatic  Right Ear: External ear normal    Left Ear: External ear normal    Mouth/Throat: No oropharyngeal exudate  Eyes: Pupils are equal, round, and reactive to light  EOM are normal  No scleral icterus  Neck: Normal range of motion  Neck supple  Thyroid mass ( Left-sided) present  Cardiovascular: Normal rate, regular rhythm and normal heart sounds  Pulmonary/Chest: Effort normal and breath sounds normal  No respiratory distress  Abdominal: Soft  Bowel sounds are normal  There is no tenderness  There is no rebound and no guarding  Musculoskeletal: Normal range of motion  Neurological: He is alert and oriented to person, place, and time  Skin: Skin is warm and dry  No rash noted  Psychiatric: He has a normal mood and affect  Nursing note and vitals reviewed        Vital Signs  ED Triage Vitals [12/27/18 1514]   Temperature Pulse Respirations Blood Pressure SpO2   97 9 °F (36 6 °C) 100 18 (!) 219/104 99 %      Temp Source Heart Rate Source Patient Position - Orthostatic VS BP Location FiO2 (%)   Temporal Monitor Sitting Right arm --      Pain Score       4           Vitals:    12/27/18 2228 12/27/18 2315 12/28/18 0509 12/28/18 0534   BP:  162/98 170/96 160/82   Pulse: 74 78 76    Patient Position - Orthostatic VS:  Sitting  Sitting       Visual Acuity      ED Medications  Medications   heparin (porcine) subcutaneous injection 5,000 Units (5,000 Units Subcutaneous Not Given 12/28/18 0510)   metoprolol (LOPRESSOR) injection 5 mg (5 mg Intravenous Given 12/28/18 0513)   famotidine (PEPCID) injection 20 mg (20 mg Intravenous Given 12/27/18 1714)   hydrALAZINE (APRESOLINE) injection 10 mg (10 mg Intravenous Given 12/27/18 2403)   sodium chloride 0 9 % with KCl 20 mEq/L infusion (premix) (75 mL/hr Intravenous New Bag 12/27/18 1814)   insulin lispro (HumaLOG) 100 units/mL subcutaneous injection 1-6 Units (3 Units Subcutaneous Given 12/28/18 0639)   morphine injection 2 mg (2 mg Intravenous Given 12/27/18 6439)   sodium chloride 0 9 % infusion (not administered)   sodium chloride 0 9 % bolus 1,000 mL (1,000 mL Intravenous New Bag 12/27/18 1616)   metoprolol (LOPRESSOR) injection 5 mg (5 mg Intravenous Given 12/27/18 1618)       Diagnostic Studies  Results Reviewed     Procedure Component Value Units Date/Time    Comprehensive metabolic panel [[id number]]  (Abnormal) Collected:  12/27/18 1615    Lab Status:  Final result Specimen:  Blood from Arm, Right Updated:  12/27/18 1644     Sodium 134 (L) mmol/L      Potassium 3 9 mmol/L      Chloride 95 (L) mmol/L      CO2 28 mmol/L      ANION GAP 11 mmol/L      BUN 12 mg/dL      Creatinine 1 61 (H) mg/dL      Glucose 273 (H) mg/dL      Calcium 9 1 mg/dL      AST 14 U/L      ALT 18 U/L      Alkaline Phosphatase 104 U/L      Total Protein 8 3 (H) g/dL      Albumin 4 1 g/dL      Total Bilirubin 1 20 (H) mg/dL      eGFR 45 ml/min/1 73sq m     Narrative:         National Kidney Disease Education Program recommendations are as follows:  GFR calculation is accurate only with a steady state creatinine  Chronic Kidney disease less than 60 ml/min/1 73 sq  meters  Kidney failure less than 15 ml/min/1 73 sq  meters      Lipase [[id number]]  (Normal) Collected:  12/27/18 1615    Lab Status:  Final result Specimen:  Blood from Arm, Right Updated:  12/27/18 1644     Lipase 150 u/L     CBC and differential [[id number]] Collected:  12/27/18 1615    Lab Status:  Final result Specimen:  Blood from Arm, Right Updated:  12/27/18 1627     WBC 7 70 Thousand/uL      RBC 4 24 Million/uL      Hemoglobin 13 4 g/dL      Hematocrit 37 8 %      MCV 89 fL      MCH 31 6 pg      MCHC 35 4 g/dL      RDW 12 3 %      MPV 9 2 fL Platelets 579 Thousands/uL      nRBC 0 /100 WBCs      Neutrophils Relative 70 %      Immat GRANS % 1 %      Lymphocytes Relative 17 %      Monocytes Relative 9 %      Eosinophils Relative 2 %      Basophils Relative 1 %      Neutrophils Absolute 5 43 Thousands/µL      Immature Grans Absolute 0 04 Thousand/uL      Lymphocytes Absolute 1 33 Thousands/µL      Monocytes Absolute 0 69 Thousand/µL      Eosinophils Absolute 0 17 Thousand/µL      Basophils Absolute 0 04 Thousands/µL                  No orders to display              Procedures  Procedures       Phone Contacts  ED Phone Contact    ED Course  ED Course as of Dec 28 0652   Thu Dec 27, 2018   [id number] GI paged    [phone] Discussed RENÉ Felix from GI who will eval patient in ED regarding PEG                                MDM  Number of Diagnoses or Management Options  Diabetes [facility]): minor  Difficulty in swallowing: new and requires workup  Hypertension: minor  Thyroid cancer [facility]):   Diagnosis management comments: The plan is to check some basic laboratories    GI and Medicine were consulted for admission and management of the patient's difficulty swallowing with new diagnosis of aggressive thyroid mass       Amount and/or Complexity of Data Reviewed  Clinical lab tests: ordered and reviewed  Review and summarize past medical records: yes  Discuss the patient with other providers: yes      CritCare Time    Disposition  Final diagnoses:   Thyroid cancer ([facility] )   Difficulty in swallowing   Hypertension   Diabetes ([facility] )     Time reflects when diagnosis was documented in both MDM as applicable and the Disposition within this note     Time User Action Codes Description Comment    12/27/2018  3:58 PM Franca Strong Thyroid cancer ([facility] )     12/27/2018  3:58 PM Cam[address] Reba Add [R13 10] Difficulty in swallowing     12/27/2018  3:58 PM [address] Add [I10] Hypertension     12/27/2018  4:13 PM [address] Add [E11 9] Diabetes ([facility] )     12/27/2018  4:40 PM Foster Riddle Add [R13 12] Oropharyngeal dysphagia     12/27/2018  4:40 PM Foster Riddle Modify [R13 12] Oropharyngeal dysphagia       ED Disposition     ED Disposition Condition Comment    Admit  Case was discussed with Dr Anni Garcia and the patient's admission status was agreed to be Admission Status: inpatient status to the service of Dr Anni Garcia   Follow-up Information     Follow up With Specialties Details Why Contact Info    Ricardo Coronado MD Internal Medicine Follow up  [address]            There are no discharge medications for this patient  No discharge procedures on file      ED Provider  Electronically Signed by           Martina Bajwa DO  12/28/18 6255

## 2018-12-27 NOTE — TELEPHONE ENCOUNTER
Dr Seb bob would like to speak with you about this patient   He has not been seen by you in the past     Call back # [phone]

## 2018-12-27 NOTE — PLAN OF CARE
Problem: Nutrition/Hydration-ADULT  Goal: Nutrient/Hydration intake appropriate for improving, restoring or maintaining nutritional needs  Monitor and assess patient's nutrition/hydration status for malnutrition (ex- brittle hair, bruises, dry skin, pale skin and conjunctiva, muscle wasting, smooth red tongue, and disorientation)  Collaborate with interdisciplinary team and initiate plan and interventions as ordered  Monitor patient's weight and dietary intake as ordered or per policy  Utilize nutrition screening tool and intervene per policy  Determine patient's food preferences and provide high-protein, high-caloric foods as appropriate       INTERVENTIONS:  - Monitor oral intake, urinary output, labs, and treatment plans  - Assess nutrition and hydration status and recommend course of action  - Evaluate amount of meals eaten  - Assist patient with eating if necessary   - Allow adequate time for meals  - Recommend/ encourage appropriate diets, oral nutritional supplements, and vitamin/mineral supplements  - Order, calculate, and assess calorie counts as needed  -   - Assess need for intravenous fluids  - Provide specific nutrition/hydration education as appropriate  - Include patient/family/caregiver in decisions related to nutrition  Outcome: Progressing

## 2018-12-28 NOTE — UTILIZATION REVIEW
Initial Clinical Review    Admission: Date/Time/Statement: 12/27/18 @ 1614     Orders Placed This Encounter   Procedures    Inpatient Admission (expected length of stay for this patient is greater than two midnights)     Standing Status:   Standing     Number of Occurrences:   1     Order Specific Question:   Admitting Physician     Answer:   Natasha Allison     Order Specific Question:   Level of Care     Answer:   Med Surg [16]     Order Specific Question:   Estimated length of stay     Answer:   More than 2 Midnights     Order Specific Question:   Certification     Answer:   I certify that inpatient services are medically necessary for this patient for a duration of greater than two midnights  See H&P and MD Progress Notes for additional information about the patient's course of treatment  ED: Date/Time/Mode of Arrival:   ED Arrival Information     Expected Arrival Acuity Means of Arrival Escorted By Service Admission Type    - 12/27/2018 15:02 Emergent Walk-In Self General Medicine Emergency    Arrival Complaint    throat problem          Chief Complaint:   Chief Complaint   Patient presents with    Difficulty Swallowing     Patient reports increased difficulty swallowing, recently had a biopsy and found out that he has "aggressive throat cancer"  Reports this morning he was unable to swallow his pills  Tolerating fluids, denies N/V, denies difficulty breathing, speaking in full sentences  History of Illness: Mir Hernandez is a 59 y o  male who presents with difficulty swallowing  Patient was recently diagnosed with 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  He was seen at ENT office today described unable to swallow anything thicker than water (progressing several weeks)  He had been able to eat soft foods Jell-O,  soups that this is becoming more difficulty  Today on able to swallow his pills or anything other than water    Patient was recently tiarra felt pull in his neck and workup revealed the thyroid cancer  ED Vital Signs:   ED Triage Vitals [12/27/18 1514]   Temperature Pulse Respirations Blood Pressure SpO2   97 9 °F (36 6 °C) 100 18 (!) 219/104 99 %      Temp Source Heart Rate Source Patient Position - Orthostatic VS BP Location FiO2 (%)   Temporal Monitor Sitting Right arm --      Pain Score       4        Wt Readings from Last 1 Encounters:   12/27/18 115 kg (252 lb 9 6 oz)       Vital Signs (abnormal):    above    Abnormal Labs/Diagnostic Test Results:   NA  134  Chloride  95  Creat  1 61  Total  Bili   1 20    ED Treatment:   Medication Administration from 12/27/2018 1502 to 12/27/2018 1652       Date/Time Order Dose Route Action Action by Comments     12/27/2018 1616 sodium chloride 0 9 % bolus 1,000 mL 1,000 mL Intravenous New Bag Janna Castillo RN      12/27/2018 1618 metoprolol (LOPRESSOR) injection 5 mg 5 mg Intravenous Given Francoise Aguilar RN VI22 /86          Past Medical/Surgical History: Active Ambulatory Problems     Diagnosis Date Noted    No Active Ambulatory Problems     Resolved Ambulatory Problems     Diagnosis Date Noted    No Resolved Ambulatory Problems     Past Medical History:   Diagnosis Date    Cancer ([facility] )     Diabetes mellitus ([facility] )     GERD (gastroesophageal reflux disease)     Hypertension        Admitting Diagnosis: Difficulty in swallowing [R13 10]  Oropharyngeal dysphagia [R13 12]  Diabetes (HCC) [E11 9]  Th[facility] ([facility] ) [C73]  Hypertension [I10]  Trouble swallowing [R13 10]    Age/Sex: 59 y o  male    Assessment/Plan: Dysphagia                               possibly related to thyroid mass increasing on upper esophagus  CT reveals thickened esophagus extending inferiorly into the upper mediastinum  Will place NPO consult GI for possible EGD and PEG tube placement    Will also consult Ear Nose and Throat     Thyroid carcinoma with metastasis    will consult Ear Nose and Throat patient had reached biopsies undifferentiated carcinoma, discussed with ENT treatment plan and involvement of Oncology     Diabetes                                  will place NPO with Accu-Cheks     Hypertension                           IV beta-blocker the p r n  Hydralazine and monitor     Morbid obesity                         benefit from weight loss     GERD                                      provide IV Pepcid     ELBERT                                          the pre renal with decreased intake the provide IV fluids and monitor        Expect greater than 2 midnight stay    Admission Orders:   IP  12/27  @   1614  Scheduled Meds:   Current Facility-Administered Medications:  cefazolin 1,000 mg Intravenous Once Maddi Ovalle PA-C    cloNIDine 0 2 mg Transdermal Weekly Wilda Ripa, DO    famotidine 20 mg Intravenous BID Wilda Ripa, DO    heparin (porcine) 5,000 Units Subcutaneous Q8H [address], DO    hydrALAZINE 10 mg Intravenous Q6H PRN Wilda Ripa, DO    insulin glargine 15 Units Subcutaneous QAM Wilda Ripa, DO    insulin lispro 1-6 Units Subcutaneous Q6H [address] Wilda Ripa, DO    metoprolol 5 mg Intravenous Q6H Wilda Ripa, DO    morphine injection 2 mg Intravenous Q4H PRN Wilda Ripa, DO    sodium chloride 125 mL/hr Intravenous Continuous Kerry Mayes, DO    sodium chloride 0 9 % with KCl 20 mEq/L 75 mL/hr Intravenous Continuous Wilda Ripa, DO Last Rate: 75 mL/hr (12/28/18 0824)     Continuous Infusions:   sodium chloride 125 mL/hr    sodium chloride 0 9 % with KCl 20 mEq/L 75 mL/hr Last Rate: 75 mL/hr (12/28/18 0824)     PRN Meds: hydrALAZINE    morphine injection     NPO  Cons  GI  Cons  ENT  Cons radiation oncology  EGD  12/28    Per  GI Consult:     ) Dysphagia: Patient recently diagnosed with likely metastatic thyroid cancer  CT soft tissue neck shows the thyroid mass is inseparable from the upper esophagus which is diffusely thickened and extends inferiorly into the upper mediastinum  There is concern the mass is compressing the upper esophagus causing dysphagia  He has only been tolerating liquids and now cannot take medication by mouth      -Discussed EGD with PEG placement with patient and son at bedside, they are both agreeable to the procedure  -Keep NPO and plan for PEG placement tomorrow     2) Thyroid cancer: New diagnosis    Per  ENT  Consult:    Metastatic undifferentiated thyroid carcinoma with bilateral pulmonary Mets, cervical metastases, and probable esophageal invasion  Plan:  Patient is scheduled for an EGD early this morning  If possible a PEG will be placed and if there is tumor obstructing the area he may require open G-tube placement  With the aggressiveness of his underlying disease I would recommend trying to obtain a Radiation Oncology consultation as well as an endocrine consultation in the very near future  Clinton Orellana is a 59y o  year old male who presented to my office for the 1st time last week  He was sent for difficulty swallowing and upon examination was noted to have a large left-sided neck mass which was likely consistent with the thyroid  He was also found to have paresis of the left vocal fold  He was sent for multiple tests including ultrasound-guided biopsies of the thyroid and the left cervical lymph node  He was also sent for CT of the chest and neck  Ultimately, findings were returned and demonstrated an 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 cervical neck metastases  The tumor on the left side is extremely large and either invades or completely compresses the esophagus  I brought him into the office setting yesterday after he called early in the morning stating that he was unable to swallow his pills  Because he has underlying hypertension and diabetes he requires the use of those pills to prevent any further issues    Since he was unable to swallow them and because of the fact that there is possible erosion into the esophagus I recommended that he come to the hospital through the emergency room so that he can obtain an EGD and possible PEG tube placement  This would also help to determine whether or not the tumor would be able to be resected surgically with postoperative radioactive iodine  That test is pending for early this morning  [facility] Review Department  Phone: [phone]; Fax [phone]  [email or web address]  ATTENTION: Please call with any questions or concerns to [phone]  and carefully listen to the prompts so that you are directed to the right person  Send all requests for admission clinical reviews, approved or denied determinations and any other requests to fax [phone]   All voicemails are confidential

## 2018-12-28 NOTE — PROGRESS NOTES
Patient with elevated blood pressure prior to EGD with PEG placement  Dr Lana Rebolledo aware  He said that we may proceed with the procedure

## 2018-12-28 NOTE — CONSULTS
Consultation - ENT   Greg Shore 59 y o  male MRN: [id number]  Unit/Bed#: E2 -01 Encounter: [id number]      Assessment/Plan     Assessment:  Metastatic undifferentiated thyroid carcinoma with bilateral pulmonary Mets, cervical metastases, and probable esophageal invasion  Plan:  Patient is scheduled for an EGD early this morning  If possible a PEG will be placed and if there is tumor obstructing the area he may require open G-tube placement  With the aggressiveness of his underlying disease I would recommend trying to obtain a Radiation Oncology consultation as well as an endocrine consultation in the very near future  History of Present Illness   Physician Requesting Consult: Charley Baker DO  Reason for Consult / Principal Problem:  Metastatic thyroid carcinoma  HPI: Greg Shore is a 59y o  year old male who presented to my office for the 1st time last week  He was sent for difficulty swallowing and upon examination was noted to have a large left-sided neck mass which was likely consistent with the thyroid  He was also found to have paresis of the left vocal fold  He was sent for multiple tests including ultrasound-guided biopsies of the thyroid and the left cervical lymph node  He was also sent for CT of the chest and neck  Ultimately, findings were returned and demonstrated an 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 cervical neck metastases  The tumor on the left side is extremely large and either invades or completely compresses the esophagus  I brought him into the office setting yesterday after he called early in the morning stating that he was unable to swallow his pills  Because he has underlying hypertension and diabetes he requires the use of those pills to prevent any further issues    Since he was unable to swallow them and because of the fact that there is possible erosion into the esophagus I recommended that he come to the hospital through the emergency room so that he can obtain an EGD and possible PEG tube placement  This would also help to determine whether or not the tumor would be able to be resected surgically with postoperative radioactive iodine  That test is pending for early this morning  Consults    Review of Systems   HENT: Positive for trouble swallowing (Only able to swallow liquids and Jell-O)  All other systems reviewed and are negative  Historical Information   Past Medical History:   Diagnosis Date    Cancer ([facility] )     Diabetes mellitus ([facility] )     GERD (gastroesophageal reflux disease)     Hypertension      Past Surgical History:   Procedure Laterality Date    HERNIA REPAIR      US GUIDED LYMPH NODE BIOPSY LEFT  12/24/2018    US GUIDED THYROID BIOPSY  12/24/2018     Social History   History   Alcohol Use No     History   Drug Use No     History   Smoking Status    Former Smoker   Smokeless Tobacco    Never Used     Comment: Former chewing tobacco use     Family History: History reviewed  No pertinent family history      Meds/Allergies   current meds:   Current Facility-Administered Medications   Medication Dose Route Frequency    famotidine (PEPCID) injection 20 mg  20 mg Intravenous BID    heparin (porcine) subcutaneous injection 5,000 Units  5,000 Units Subcutaneous Q8H [facility]    hydrALAZINE (APRESOLINE) injection 10 mg  10 mg Intravenous Q6H PRN    insulin lispro (HumaLOG) 100 units/mL subcutaneous injection 1-6 Units  1-6 Units Subcutaneous Q6H [facility]    metoprolol (LOPRESSOR) injection 5 mg  5 mg Intravenous Q6H    morphine injection 2 mg  2 mg Intravenous Q4H PRN    sodium chloride 0 9 % infusion  125 mL/hr Intravenous Continuous    sodium chloride 0 9 % with KCl 20 mEq/L infusion (premix)  75 mL/hr Intravenous Continuous    and PTA meds:   None       No Known Allergies    Objective       Intake/Output Summary (Last 24 hours) at 12/28/18 5391  Last data filed at 12/28/18 0601   Gross per 24 hour   Intake           883 75 ml   Output                0 ml   Net           883 75 ml       Invasive Devices     Peripheral Intravenous Line            Peripheral IV Right Antecubital -- days                Physical Exam   Constitutional: He appears well-developed and well-nourished  No distress  HENT:   Head: Normocephalic and atraumatic  Right Ear: External ear normal    Left Ear: External ear normal    Nose: Nose normal    Mouth/Throat: Oropharynx is clear and moist  No oropharyngeal exudate  Eyes: Pupils are equal, round, and reactive to light  Conjunctivae and EOM are normal    Neck: Normal range of motion  Trachea is deviated to the right side  There is a large left neck mass consistent with thyroid gland  Was also lymphadenopathy on left side of the neck which is palpable  Skin: He is not diaphoretic  Lab Results: I have personally reviewed pertinent lab results  Imaging Studies: I have personally reviewed pertinent films in PACS  EKG, Pathology, and Other Studies: I have personally reviewed pertinent reports        Code Status: Level 1 - Full Code  Advance Directive and Living Will:      Power of :    POLST:      None

## 2018-12-28 NOTE — OP NOTE
OPERATIVE REPORT  PATIENT NAME: Ifrah Witt    :  1955  MRN: [id number]  Pt Location: [facility] ROOM 01    SURGERY DATE: 2018    Surgeon(s) and Role:     Diallo Bravo MD - Primary    Preop Diagnosis:  Thyroid cancer ([facility] ) [C73]  Difficulty in swallowing [R13 10]    Post-Op Diagnosis Codes: * Thyroid cancer ([facility] ) [C73]     * Difficulty in swallowing [R13 10]    Procedure(s) (LRB):  ESOPHAGOGASTRODUODENOSCOPY (EGD) with PEG placement (N/A)    Specimen(s):  * No specimens in log *    Estimated Blood Loss:   Minimal    PERCUTANEOUS ENDOSCOPIC GASTROSTOMY TUBE PLACEMENT    PROCEDURE: EGD/ PEG tube placement    INDICATIONS: Dysphagia    POST-OP DIAGNOSIS: See the impression below    SEDATION: Monitored anesthesia care, check anesthesia records    PHYSICAL EXAM:    Blood pressure (!) 182/87, pulse 84, temperature 99 3 °F (37 4 °C), temperature source Temporal, resp  rate 14, height 5' 10" (1 778 m), weight 115 kg (252 lb 9 6 oz), SpO2 96 %  Body mass index is 36 24 kg/m²  General: NAD  Heart: S1 & S2 normal, RRR  Lungs: CTA, No rales or rhonchi  Abdomen: Soft, nontender, nondistended, good bowel sounds    CONSENT:  Informed consent was obtained for the procedure, including sedation after explaining the risks and benefits of the procedure  Risks including but not limited to bleeding, perforation, infection, aspiration were discussed in detail  Also explained about less than 100% sensitivity with the exam and other alternatives  PREPARATION:   EKG tracing, pulse oximetry, blood pressure were monitored throughout the procedure  Patient was identified by myself both verbally and by visual inspection of ID band  DESCRIPTION:   Patient was placed in the supine position and was sedated with the above medication  The gastroscope was introduced in to the oropharynx and the esophagus was intubated under direct visualization   Scope was passed down the esophagus up to second portion of the duodenum A careful inspection was made as the gastroscope was withdrawn, including a retroflexed view of the stomach; findings and interventions are described below  Appropriate photodocumentation was obtained  FINDINGS:    1  Esophagus and GEJ- the esophagus was compressed at the 20-21 cm hope  There was resistance to passage of the scope  No tumor was seen  2  Stomach- a few polyps were seen in the stomach suggestive of fundic gland polyps  Stomach was insufflated with air and the G tube site was identified by transillumination and palpation technique  The site was cleaned with betadine and infiltrated with 1%Lidocaine  Small incision was made and the trocar was passed through the incision in to the stomach under direct visualization  Guide wire was passed through the trocar and grabbed with the snare  The guide wire was brought out along with gradual withdrawal of the scope  The 20 [place] Che G tube was tied to the guide wire and placed in the stomach by the standard pull technique  The external bumper was place at 4 cm  3  Duodenum- the duodenal bulb and the 2nd portion appeared to be normal          IMPRESSIONS:     1  Successful PEG tube placement  2  Esophageal narrowing at the proximal esophagus  No invasion of tumor was seen  RECOMMENDATIONS:    1  Clear liquid diet  2  May use PEG for water flushes and medications today and start tube feeds tomorrow if appears to be stable  3  Loosen up PEG bumper tomorrow  COMPLICATIONS: None; patient tolerated the procedure well      DISPOSITION: PACU           CONDITION: Stable

## 2018-12-28 NOTE — ANESTHESIA PREPROCEDURE EVALUATION
Review of Systems/Medical History  Patient summary reviewed  Chart reviewed      Cardiovascular  Hypertension ,    Pulmonary  Smoker ex-smoker  Cumulative Pack Years: 21,   Comment: Probable pulmonary metastasis    FINDINGS:     LUNGS:    There are numerous bilateral pulmonary nodules suspicious for metastatic disease  0 6 cm left upper lobe pulmonary nodule (series 302 image 70)  0 7 cm right lower lobe pulmonary nodule small central area of cavitation (series 302 image 94)  1 1 x 1 0 cm nodule superior segment left lower lobe (series 302 image 50)  The left upper lobe tree-in-bud opacities, which may be infectious or inflammatory, immediately subjacent to a pleural-based mass  GI/Hepatic  Dysphagia, Dysphagia type: liquids and solids  GERD well controlled,   Comment: dysphagia to everything but water, jello, and some thickened soup          Endo/Other  Diabetes poorly controlled type 2 Oral agent, History of thyroid disease ,   Comment: Thyroid cancer Obesity    GYN       Hematology  Negative hematology ROS      Musculoskeletal  Negative musculoskeletal ROS        Neurology  Negative neurology ROS      Psychology   Negative psychology ROS              Physical Exam    Airway    Mallampati score: III  TM Distance: >3 FB  Neck ROM: full     Dental   No notable dental hx     Cardiovascular  Rhythm: regular, Rate: normal, Cardiovascular exam normal    Pulmonary  Pulmonary exam normal Breath sounds clear to auscultation,     Other Findings        Anesthesia Plan  ASA Score- 3     Anesthesia Type- IV sedation with anesthesia and general with ASA Monitors  Additional Monitors:   Airway Plan:         Plan Factors- Patient instructed to abstain from smoking on day of procedure  Patient did not smoke on day of surgery  Induction- intravenous  Postoperative Plan- Plan for postoperative opioid use  Informed Consent- Anesthetic plan and risks discussed with patient

## 2018-12-28 NOTE — PROGRESS NOTES
Progress Note - Mir Hernandez 59 y o  male MRN: [id number]    Unit/Bed#: E2 -01 Encounter: [id number]    Assessment/Plan:    Dysphagia   related to thyroid cancer invading esophagus, scheduled for EGD and PEG placement today    Metastatic undifferentiated thyroid carcinoma patient was referred by ENT, will discuss chemotherapy radiation/treatment plan    Hypertension   poorly controlled, will add clonidine patch to Lopressor continue to monitor    A KI    most likely pre renal with decreased intake continue IV fluids and monitor creatinine    Morbid obesity  rec wt loss    DM    Poor controlled start Lantus with SS now NPO    Subjective:   Still with difficulty swallowing, denies chest pain shortness of breath nausea vomiting diarrhea no fevers chills hungry but everything gets stuck    Objective:     Vitals: Blood pressure 160/89, pulse 84, temperature (!) 97 4 °F (36 3 °C), temperature source Temporal, resp  rate 16, height 5' 10" (1 778 m), weight 115 kg (252 lb 9 6 oz), SpO2 97 %  ,Body mass index is 36 24 kg/m²          Results from last 7 days  Lab Units 12/27/18  1615   WBC Thousand/uL 7 70   HEMOGLOBIN g/dL 13 4   HEMATOCRIT % 37 8   PLATELETS Thousands/uL 214       Results from last 7 days  Lab Units 12/27/18  1615   POTASSIUM mmol/L 3 9   CHLORIDE mmol/L 95*   CO2 mmol/L 28   BUN mg/dL 12   CREATININE mg/dL 1 61*   CALCIUM mg/dL 9 1   ALK PHOS U/L 104   ALT U/L 18   AST U/L 14       Scheduled Meds:    Current Facility-Administered Medications:  cefazolin 1,000 mg Intravenous Once Jessica Henry PA-C    cloNIDine 0 2 mg Transdermal Weekly Jennifer Cabrera DO    famotidine 20 mg Intravenous BID Jennifer Cabrera DO    heparin (porcine) 5,000 Units Subcutaneous [facility] Jennifer Cabrera DO    hydrALAZINE 10 mg Intravenous Q6H PRN Jennifer Cabrera DO    insulin glargine 15 Units Subcutaneous QAM Jennifer Cabrera, DO    insulin lispro 1-6 Units Subcutaneous Q6H [address] Jennifer Cabrera DO    metoprolol 5 mg Intravenous Q6H Wilda Ripa, DO    morphine injection 2 mg Intravenous Q4H PRN Wilda Ripa, DO    sodium chloride 125 mL/hr Intravenous Continuous Kerry Mayes, DO    sodium chloride 0 9 % with KCl 20 mEq/L 75 mL/hr Intravenous Continuous Wilda Ripa, DO Last Rate: 75 mL/hr (12/28/18 0824)       Continuous Infusions:    sodium chloride 125 mL/hr    sodium chloride 0 9 % with KCl 20 mEq/L 75 mL/hr Last Rate: 75 mL/hr (12/28/18 0824)     Physical exam:  General appearance:  Alert no distress interaction appropriate   Head/Eyes:  Nonicteric PERRL EOMI  Neck:  Supple  Lungs: CTA bilateral no wheezing rhonchi or rales  Heart: normal S1 S2 regular  Abdomen:  Obese nontender with bowel sounds  Extremities: no edema  Skin: no rash    Invasive Devices     Peripheral Intravenous Line            Peripheral IV Right Antecubital -- days                  VTE Pharmacologic Prophylaxis:  heparin   VTE Mechanical Prophylaxis:  SCDs                     Counseling / Coordination of Care  Total floor / unit time spent today 30   minutes  Greater than 50% of total time was spent with the patient and / or family counseling and / or coordination of care    A description of the counseling / coordination of care:  Discussed with wife and Oncology

## 2018-12-28 NOTE — PLAN OF CARE
Problem: Potential for Falls  Goal: Patient will remain free of falls  INTERVENTIONS:  - Assess patient frequently for physical needs  -  Identify cognitive and physical deficits and behaviors that affect risk of falls    -  [place] fall precautions as indicated by assessment   - Educate patient/family on patient safety including physical limitations  - Instruct patient to call for assistance with activity based on assessment  - Modify environment to reduce risk of injury  - Consider OT/PT consult to assist with strengthening/mobility   Outcome: Progressing

## 2018-12-29 NOTE — PROGRESS NOTES
Patient Name: Mir Hernandez  Patient MRN: [id number]  Date: 12/29/18  Service: [facility]    Subjective   Mir Hernandez is a 59 y o  male who was admitted with Oropharyngeal dysphagia  He had a PEG tube placed yesterday  Peg site is clear  Bumper loosened  He has no complaints of any abdominal pain  Objective     Vitals  Blood pressure 169/75, pulse 87, temperature 97 9 °F (36 6 °C), temperature source Temporal, resp  rate 18, height 5' 10" (1 778 m), weight 115 kg (252 lb 9 6 oz), SpO2 98 %  General: Alert, no apparent distress  Eyes: No scleral icterus  ENT: MMM  Card: RRR no murmur  Lungs: Clear to ascultation b/l  No wheezes, rales, rhonchi  Abdomen: Soft  Nontender  Nondistended  Bowel sounds present and normoactive    Peg tube in place site clear  Skin: No jaundice  Neuro: Alert and oriented x3        Laboratory Studies  Lab Results   Component Value Date    CREATININE 1 37 (H) 12/29/2018    BUN 12 12/29/2018    SODIUM 134 (L) 12/29/2018    K 3 8 12/29/2018    CL 99 (L) 12/29/2018    CO2 23 12/29/2018    CALCIUM 8 6 12/29/2018    ALKPHOS 104 12/27/2018    AST 14 12/27/2018    ALT 18 12/27/2018     Lab Results   Component Value Date    WBC 7 72 12/29/2018    HGB 13 6 12/29/2018    HCT 38 5 12/29/2018     12/29/2018    MCV 90 12/29/2018     Lab Results   Component Value Date    PROTIME 14 3 (H) 12/28/2018    INR 1 10 12/28/2018       Imaging and Other Studies    Inhouse Medications       Current Facility-Administered Medications:     cloNIDine (CATAPRES-TTS-2) 0 2 mg/24 hr TD weekly patch, 0 2 mg, Transdermal, Weekly, 0 2 mg at 12/28/18 1114    famotidine (PEPCID) injection 20 mg, 20 mg, Intravenous, BID, 20 mg at 12/29/18 1000    heparin (porcine) subcutaneous injection 5,000 Units, 5,000 Units, Subcutaneous, Q8H [address], 5,000 Units at 12/29/18 0626 **AND** [CANCELED] Platelet count, , , Once    hydrALAZINE (APRESOLINE) injection 10 mg, 10 mg, Intravenous, Q6H PRN, 10 mg at 12/27/18 1841    [START ON 12/30/2018] insulin glargine (LANTUS) subcutaneous injection 20 Units 0 2 mL, 20 Units, Subcutaneous, QAM    insulin lispro (HumaLOG) 100 units/mL subcutaneous injection 1-6 Units, 1-6 Units, Subcutaneous, Q6H [address], 3 Units at 12/29/18 0626 **AND** Fingerstick Glucose (POCT), , , Q6H    lisinopril (ZESTRIL) tablet 20 mg, 20 mg, Oral, Daily, 20 mg at 12/29/18 1010    metoprolol tartrate (LOPRESSOR) tablet 50 mg, 50 mg, Oral, Q8H [facility]    morphine (PF) 4 mg/mL injection 4 mg, 4 mg, Intravenous, Q4H PRN, 4 mg at 12/28/18 1753    repaglinide (PRANDIN) tablet 1 mg, 1 mg, Oral, TID AC    sitaGLIPtin (JANUVIA) tablet 100 mg, 100 mg, Oral, Daily, 100 mg at 12/29/18 1010      Assessment/Plan:  1  Metastatic thyroid cancer with dysphagia  Status post PEG placement 12/28/2018  Peg site clear bumper loosened  Patient tolerating liquids and diet advanced to puree for now per primary  GI will sign off will be available if needed      JETHRO Greer

## 2018-12-29 NOTE — PROGRESS NOTES
Progress Note - Lina Casanova 59 y o  male MRN: [id number]    Unit/Bed#: E2 -01 Encounter: [id number]    Assessment/Plan:    Dysphagia   related to invading thyroid cancer compressing esophagus, reviewed the EGD report, patient is now tolerating liquids will try pureed with thin liquids PEG placed for initiating radiation    Metastatic undifferentiated thyroid carcinoma patient has an appointment with radiation oncology to initiate treatment with ENT follow-up    Hypertension   poor control, DC IV fluids, restart p o  ACE-inhibitor and beta-blocker continue clonidine patch    A KI    creatinine stable 1 37, will DC IV fluids and monitor    Diabetes   poor control, await hemoglobin A1c value, restart Januvia initiate Prandin with Lantus and ADA diet continue sliding scale       Morbid obesity  would benefit from weight loss    Subjective:   Feels good, less throat discomfort, denies chest pain shortness of breath no nausea vomiting no fevers chills good appetite liquids passing through    Objective:     Vitals: Blood pressure 169/75, pulse 87, temperature 97 9 °F (36 6 °C), temperature source Temporal, resp  rate 18, height 5' 10" (1 778 m), weight 115 kg (252 lb 9 6 oz), SpO2 98 %  ,Body mass index is 36 24 kg/m²  Results from last 7 days  Lab Units 12/29/18  0558  12/28/18  1101   WBC Thousand/uL 7 72  < >  --    HEMOGLOBIN g/dL 13 6  < >  --    HEMATOCRIT % 38 5  < >  --    PLATELETS Thousands/uL 213  < >  --    INR   --   --  1 10   < > = values in this interval not displayed  Results from last 7 days  Lab Units 12/29/18  0558  12/27/18  1615   POTASSIUM mmol/L 3 8  < > 3 9   CHLORIDE mmol/L 99*  < > 95*   CO2 mmol/L 23  < > 28   BUN mg/dL 12  < > 12   CREATININE mg/dL 1 37*  < > 1 61*   CALCIUM mg/dL 8 6  < > 9 1   ALK PHOS U/L  --   --  104   ALT U/L  --   --  18   AST U/L  --   --  14   < > = values in this interval not displayed      Scheduled Meds:    Current Facility-Administered Medications:  cloNIDine 0 2 mg Transdermal Weekly Chi Ortega DO   famotidine 20 mg Intravenous BID Chi Ortega DO   heparin (porcine) 5,000 Units Subcutaneous [facility] Chi Ortega DO   hydrALAZINE 10 mg Intravenous Q6H PRN Chi Ortega DO   [START ON 12/30/2018] insulin glargine 20 Units Subcutaneous QAM Chi Ortega DO   insulin lispro 1-6 Units Subcutaneous Q6H [address] Chi Ortega DO   lisinopril 20 mg Oral Daily Chi Ortega DO   metoprolol tartrate 50 mg Oral [facility] Chi Ortega DO   morphine injection 4 mg Intravenous Q4H PRN Chi Ortega DO   repaglinide 1 mg Oral TID AC Chi Ortega DO   sitaGLIPtin 100 mg Oral Daily Chi Ortega DO       Continuous Infusions:     Physical exam:  General appearance:  Alert no distress interaction appropriate  Head/Eyes:  Nonicteric PERRL EOMI  Neck:  Supple  Lungs: CTA bilateral no wheezing rhonchi or rales  Heart: normal S1 S2 regular  Abdomen:  Obese nontender with bowel sounds  Extremities: no edema  Skin: no rash    Invasive Devices     Peripheral Intravenous Line            Peripheral IV Right Antecubital -- days          Drain            Gastrostomy/Enterostomy Percutaneous endoscopic gastrostomy (PEG) 20 Fr  LUQ less than 1 day                  VTE Pharmacologic Prophylaxis:  heparin         Counseling / Coordination of Care  Total floor / unit time spent today  30   minutes  Greater than 50% of total time was spent with the patient and / or family counseling and / or coordination of care    A description of the counseling / coordination of care:  Discussed with wife

## 2018-12-29 NOTE — UTILIZATION REVIEW
[address]  Utilization Review Department  Phone: [phone]; Fax [phone]  [email or web address]  ATTENTION: Please call with any questions or concerns to [phone]  and carefully listen to the prompts so that you are directed to the right person  Send all requests for admission clinical reviews, approved or denied determinations and any other requests to fax [phone]  All voicemails are confidential     SEE INITIAL REVIEW FAXED ON 12/28 BY ROB LIMON  SENDING ADDITIONAL INFORMATION PER DR ANGEL'S REQUEST TO DR DUNCAN  Please contact me at [phone] if additional information is required    EGD completed despite elevate BP    12/28: EGD: FINDINGS:   1  Esophagus and GEJ- the esophagus was compressed at the 20-21 cm hope  There was resistance to passage of the scope  No tumor was seen    2  Stomach- a few polyps were seen in the stomach suggestive of fundic gland polyps  Stomach was insufflated with air and the G tube site was identified by transillumination and palpation technique  The site was cleaned with betadine and infiltrated with 1%Lidocaine  Small incision was made and the trocar was passed through the incision in to the stomach under direct visualization  Guide wire was passed through the trocar and grabbed with the snare  The guide wire was brought out along with gradual withdrawal of the scope  The 20 [address] G tube was tied to the guide wire and placed in the stomach by the standard pull technique  The external bumper was place at 4 cm     3  Duodenum- the duodenal bulb and the 2nd portion appeared to be normal          IMPRESSIONS:    1  Successful PEG tube placement  2  Esophageal narrowing at the proximal esophagus  No invasion of tumor was seen        RECOMMENDATIONS:   1  Clear liquid diet  2  May use PEG for water flushes and medications today and start tube feeds tomorrow if appears to be stable    3  Loosen up PEG bumper tomorrow        Date/Time Temp Pulse Resp BP SpO2   12/29/18 0745 -- -- -- 169/75 --   12/29/18 0739 97 9 °F (36 6 °C) 87 18  186/96 98 %   12/28/18 2329 97 8 °F (36 6 °C) 94 20 158/90 97 %   12/28/18 1700 -- 88 20 156/91 --   12/28/18 1645 -- 84  95  189/81 95 %   12/28/18 1635 -- 84 14  182/87 96 %   12/28/18 1506 -- -- --  218/110 --   12/28/18 1447 99 3 °F (37 4 °C) 83 20  224/109 97 %       Scheduled Meds:  Current Facility-Administered Medications:  cloNIDine 0 2 mg Transdermal Weekly   famotidine 20 mg Intravenous BID   heparin (porcine) 5,000 Units Subcutaneous Q8H [address]   hydrALAZINE 10 mg Intravenous Q6H PRN x 3 on 12/28 @1601, 1610, 1620   insulin glargine 15 Units Subcutaneous QAM   insulin lispro 1-6 Units Subcutaneous Q6H [facility]   lidocaine viscous 15 mL Swish & Swallow 4x Daily PRN   metoprolol 5 mg Intravenous Q6H   morphine injection 4 mg Intravenous Q4H PRN x 1 on 12/28   sodium chloride 0 9 % with KCl 20 mEq/L 75 mL/hr Intravenous Continuous

## 2018-12-29 NOTE — CONSULTS
Pt is s/p PEG tube placement on 12/28 in preparation for cancer therapy  Currently, pt is tolerating well a pureed diet with thin liquids  Recommend to continue oral diet as long as tolerated  Pt amenable to Ensure pudding and Ensure enlivve OD to aid in preventing wt loss and to aid in meeting estimated needs    If needed, consult for TF rec's

## 2018-12-30 NOTE — DISCHARGE INSTRUCTIONS
Flash feeding to daily unclamp 30 cubic centimeters of water and reclamp  Check blood sugars 4 times daily for breakfast lunch dinner bedtime if glucose less than 80 drink orange juice or sugar candy call family physician take recordings to next physician visit for review

## 2018-12-30 NOTE — DISCHARGE SUMMARY
Discharge Summary - Medical Tavo Hy 59 y o  male MRN: [id number]    Formerly [facility], [facility] [place] [address] MED SURG Room / Bed: [place] [address] /E2 -* Encounter: [id number]    BRIEF OVERVIEW    Admitting Provider: Matty Sood DO  Discharge Provider: Matty Sood DO  Admission Date: 12/27/2018     Discharge Date: No discharge date for patient encounter  Primary Care Physician at Discharge: Jeff Jacques [phone]    Primary Discharge Diagnosis  Dysphagia    Other Problems Addressed  Patient Active Problem List    Diagnosis Date Noted    Oropharyngeal dysphagia 12/27/2018    Type 2 diabetes mellitus without complication, without long-term current use of insulin ([facility] ) 12/27/2018    Essential hypertension 12/27/2018    Morbid obesity due to excess calories ([facility] ) 12/27/2018    Gastroesophageal reflux disease without esophagitis 12/27/2018    Primary cancer of thyroid with metastasis to other site [facility]) 12/27/2018    Thyroid cancer ([facility] ) 12/27/2018    Difficulty in swallowing 12/27/2018       Consulting Providers   Dr Audrey Rubin  Therapeutic Operative Procedures Performed  EGD and PEG tube placement    Discharge Disposition: home    Allergies  No Known Allergies  Diet restrictions:  Puree, thin liquids, diabetic diet and low salt   Activity restrictions:  none   Discharge Condition:  Hanane Knowles in 1 week  Follow up with consulting providers  ENT as scheduled  Radiology oncology as scheduled  Oncology within a week  Endocrinology within a week     [address] [place] [address]    Presenting Problem/History of Present Illness  Oropharyngeal dysphagia  Hospital Course  71-year-old male presents unable to swallow everything is getting stuck  Dysphagia  patient with a recent diagnosis poorly differentiated thyroid carcinoma  Dysphagia  patient was seen by Gastroenterology and an EGD and PEG tube was placed    Scope was passed into the stomach there was no tumor associated with esophagus, but compression externally was noted  Patient was able to tolerate a puree thin liquid diet  Peg was placed into anticipation of radiation with swelling may not be able to continue oral feedings  Metastatic thyroid cancer thyroid mass extending posteriorly and inferior into the upper mediastinum/esophagus, adenopathy and multiple pulmonary nodules noted  Patient's beginning the process of treatment plan  His follow-up is with their nose and throat, radiology oncology, oncology, and Endocrinology    Diabetes   glucose poorly controlled, he was initiated on Lantus Prandin and Januvia  A m  Glucose day of discharge 232  Therefore is discharged to continue Lantus 25 units at bedtime with Prandin 2 milligrams before meals and Januvia  He is going to try to limit his carbohydrate intake  Will check his blood sugars 4 times daily take those results to his PCP for further guide  Hypertension   pressures remained high during admission Catapres patch was added to his regimen  Morbid obesity   patient will attempt weight loss with carbohydrate restriction    PEG tube   given instructions to flush daily 30 cubic centimeters of water    Discharge  Statement   I spent 35 minutes discharging the patient  This time was spent on the day of discharge  I had direct contact with the patient on the day of discharge  Additional documentation is required if more than 30 minutes were spent on discharge  Discharge and follow-up discussed with GI urine nose and throat wife and son    Discharge instructions/Information to patient and family:   See after visit summary for information provided to patient and family

## 2018-12-30 NOTE — PROGRESS NOTES
Progress Note - Juan José Flanagan 59 y o  male MRN: [id number]    Unit/Bed#: E2 -01 Encounter: [id number]    Assessment/Plan:    Dysphagia  related to compression of esophagus by invading thyroid cancer, patient is tolerating liquids and pureed food, PEG tube has been placed    Metastatic undifferentiated thyroid carcinoma follow-up with Oncology Radiation Oncology ENT endocrinology    Hypertension  continue Catapres ACE-inhibitor beta-blocker    A KI   creatinine stable 1 32    Diabetes  recommend initiating Lantus 25 units, with Prandin and Januvia stressed carbohydrate restriction     Morbid obesity  would benefit from weight loss    Subjective:   Feels much better tolerating diet no chest pain shortness of breath no nausea vomiting no pain with swallowing no fevers chills today appetite    Objective:     Vitals: Blood pressure 168/79, pulse 80, temperature (!) 97 4 °F (36 3 °C), temperature source Temporal, resp  rate 18, height 5' 10" (1 778 m), weight 115 kg (252 lb 9 6 oz), SpO2 98 %  ,Body mass index is 36 24 kg/m²  Results from last 7 days  Lab Units 12/29/18  0558  12/28/18  1101   WBC Thousand/uL 7 72  < >  --    HEMOGLOBIN g/dL 13 6  < >  --    HEMATOCRIT % 38 5  < >  --    PLATELETS Thousands/uL 213  < >  --    INR   --   --  1 10   < > = values in this interval not displayed      Results from last 7 days  Lab Units 12/30/18  0534   POTASSIUM mmol/L 3 9   CHLORIDE mmol/L 98*   CO2 mmol/L 24   BUN mg/dL 14   CREATININE mg/dL 1 32*   CALCIUM mg/dL 8 5   ALK PHOS U/L 91   ALT U/L 13   AST U/L 18       Scheduled Meds:    Current Facility-Administered Medications:  cloNIDine 0 3 mg Transdermal Weekly Sarah Mendez DO   heparin (porcine) 5,000 Units Subcutaneous [facility] Sarah Mendez DO   hydrALAZINE 10 mg Intravenous Q6H PRN Sarah Mendez DO   [START ON 12/31/2018] insulin glargine 25 Units Subcutaneous QAM Sarah Mendez DO   insulin lispro 1-6 Units Subcutaneous Q6H [facility], DO lisinopril 20 mg Oral Daily [facility], DO   metoprolol tartrate 100 mg Oral Q12H 632 [facility], DO   morphine injection 4 mg Intravenous Q4H PRN [facility], DO   pantoprazole 40 mg Oral Early Morning [facility], DO   repaglinide 2 mg Oral TID AC [facility], DO   sitaGLIPtin 100 mg Oral Daily [facility], DO       Continuous Infusions:     Physical exam:  General appearance:  Alert no distress interaction appropriate  Head/Eyes:  Nonicteric PERRL EOMI  Neck:  Supple  Lungs: CTA bilateral no wheezing rhonchi or rales  Heart: normal S1 S2 regular  Abdomen:  Distended nontender peg site no erythema with bowel sounds  Extremities: no edema  Skin: no rash    Invasive Devices     Peripheral Intravenous Line            Peripheral IV Right Antecubital -- days          Drain            Gastrostomy/Enterostomy Percutaneous endoscopic gastrostomy (PEG) 20 Fr  LUQ 1 day                        Counseling / Coordination of Care  Total floor / unit time spent today 30  minutes  Greater than 50% of total time was spent with the patient and / or family counseling and / or coordination of care    A description of the counseling / coordination of care:  Discussed with son and wife

## 2018-12-30 NOTE — PLAN OF CARE
Nutrition/Hydration-ADULT     Nutrient/Hydration intake appropriate for improving, restoring or maintaining nutritional needs Adequate for Discharge        Potential for Falls     Patient will remain free of falls Adequate for Discharge          Patient ambulated in ring independently w/cane; patient ate 75% of breakfast with no complications; teaching completed on how to flush PEG tube, demonstrated for daughter and son as well

## 2018-12-31 NOTE — PROGRESS NOTES
Betty Lot  1955  Mr Lindsey Piedra is a 59 y o  male    Chief Complaint   Patient presents with    Consult     radiation oncology       Cancer Staging  No matching staging information was found for the patient  60 y/o male seen for initial radiation oncology consult for newly diagnosed metastatic thyroid cancer  He presented to [facility] urgent care on 12/12/18 with complaints of having difficulty swallowing for 1 5 weeks, cough and chocking sensation, sore throat from coughing, feeling of a lump in throat  He has history of diabetes mellitus, hypertension, and chronic GERD and has taken Prilosec for many years  He had EGD done many years ago  12/12/18 Thyroid US:   Impression: Enlarged left thyroid lobe, with 2 solid nodules, ultrasound-guided biopsy recommended  Mildly enlarged and abnormal appearing left neck lymph node lateral to the left lobe, and ultrasound-guided biopsy should also be considered  12/24/18 US-guided thyroid and left anterior jugular chain lymph node biopsy      12/24/18 CT soft tissue neck:  IMPRESSION:   Diffusely heterogeneous thyroid gland significantly enlarged on the left  This mass is inseparable from the upper esophagus which is diffusely thickened  The thickened esophagus extends inferiorly into the upper mediastinum  Heterogeneously enhancing   pathologic adenopathy is noted primarily within the left neck but also in the upper mediastinum immediately below the left thyroid mass      Multiple pulmonary nodules are noted within the upper lobes, left greater than right as well as a pleural-based mass identified within the left apex  See separate CT chest report      Findings suggest thyroid carcinoma with possible direct extension to the upper esophagus, and pulmonary metastasis  Patient recently underwent thyroid and lymph node biopsies 12/24/2018 12/24/18/ CT chest:  IMPRESSION:  1  Heterogeneous left thyroid mass, which was recently biopsied    2  Findings worrisome for pulmonary and likely left pleural metastatic disease  3   Left upper lobe tree-in-bud opacities adjacent to pleural-based mass may be infectious or inflammatory  4   Mildly enlarged left paratracheal lymph node may additionally be metastatic         12/27/18 sent to ED by Dr Cait Kelley to expedite evaluation for feeding tube, progressive worsening dysphagia, unable to swallow anything thicker than water, unable to swallow pills  He was seen by Dr Haley Alberts for GI consult  12/28/18 EGD with PEG tube placement  Findings:  1  Esophagus and GEJ- the esophagus was compressed at the 20-21 cm hope  There was resistance to passage of the scope  No tumor was seen  2  Stomach- a few polyps were seen in the stomach suggestive of fundic gland polyps  3  Duodenum- the duodenal bulb and the 2nd portion appeared to be normal     Patient eating pureed foods and drinking liquids  Crushing pills  He reports "scratchy" feeling in throat when swallowing foods  Feeding tube is in place, flushing daily with water  Last seen by dentist in November 2018, patient reports he has no dental problems  Future Appointments:  1/2/19 Dr Cait Kelley  1/4/19 Dr Corinda Bally Dr [facility] [place] - [facility]             Primary cancer of thyroid with metastasis to other site [facility])    12/24/2018 Initial Diagnosis     Primary cancer of thyroid with metastasis to other site [facility])         12/24/2018 Biopsy     A B  Thyroid, Left, mid pole ( ThinPrep and smear preparations ):  Malignant ([place] Category VI) - See note  Undifferentiated carcinoma  C D E  Neck, left anterior jugular ( ThinPrep, smear preparations and cell block ):  Conclusive evidence of malignancy  Undifferentiated carcinoma, see note              Clinical Trial: no    Screening  Tobacco  Current tobacco user: no  If yes, brief counseling provided: NA    Hypertension  Hypertension screening performed: yes  Normotensive:  no  If no, referred to PCP: no    Depression Screening  Screened for depression using PHQ-2: yes    Screened for depression using PHQ-9:  no  Screening positive or negative:  negative  If score >4, was any of the following actions taken? Additional evaluation for depression, suicide risk assesment, referral to PCP or psychiatry, medication started:  n/a    Advanced Care Planning for Patients >65 years  Advanced Care Planning Discussed:  n/a  Patient named surrogate decision maker or care plan in chart: n/a      Health Maintenance   Topic Date Due    Hepatitis C Screening  1955    Depression Screening PHQ  1955    CRC Screening: Colonoscopy  1955    Diabetic Foot Exam  07/17/1966    DM Eye Exam  07/17/1966    URINE MICROALBUMIN  07/17/1966    Pneumococcal PPSV23 Highest Risk Adult (1 of 3 - PCV13) 07/17/1975    DTaP,Tdap,and Td Vaccines (1 - Tdap) 07/17/1977    INFLUENZA VACCINE  07/01/2018    HEMOGLOBIN A1C  06/30/2019       Patient Active Problem List   Diagnosis    Oropharyngeal dysphagia    Type 2 diabetes mellitus without complication, without long-term current use of insulin [facility]    Essential hypertension    Morbid obesity due to excess calories ([facility] )    Gastroesophageal reflux disease without esophagitis    Primary cancer of thyroid with metastasis to other site [facility])    Thyroid cancer ([facility]    Difficulty in swallowing     Past Medical History:   Diagnosis Date    Cancer ([facility]     Diabetes mellitus ([facility]     GERD (gastroesophageal reflux disease)     Hypertension      Past Surgical History:   Procedure Laterality Date    COLONOSCOPY      ESOPHAGOGASTRODUODENOSCOPY N/A 12/28/2018    Procedure: ESOPHAGOGASTRODUODENOSCOPY (EGD) with PEG placement;  Surgeon: Cornelio Mcgovern MD;  Location: [facility];   Service: Gastroenterology    FRACTURE SURGERY Bilateral 2008    b/l legs    HERNIA REPAIR      LEG SURGERY Bilateral     US GUIDED LYMPH NODE BIOPSY LEFT  12/24/2018    US GUIDED THYROID BIOPSY  12/24/2018     Family History   Problem Relation Age of Onset    Thyroid disease Mother     Prostate cancer Father      Social History     Social History    Marital status: /Civil Union     Spouse name: N/A    Number of children: N/A    Years of education: N/A     Occupational History    Not on file  Social History Main Topics    Smoking status: Former Smoker    Smokeless tobacco: Never Used      Comment: Former chewing tobacco use    Alcohol use No    Drug use: No    Sexual activity: Not on file     Other Topics Concern    Not on file     Social History Narrative    No narrative on file       Current Outpatient Prescriptions:     amLODIPine-benazepril (LOTREL 5-20) 5-20 MG per capsule, Take 1 capsule by mouth 2 (two) times a day, Disp: , Rfl:     aspirin (ECOTRIN LOW STRENGTH) 81 mg EC tablet, Take 81 mg by mouth daily, Disp: , Rfl:     cloNIDine (CATAPRES-TTS-3) 0 3 mg/24 hr, Place 1 patch (0 3 mg total) on the skin once a week, Disp: 4 patch, Rfl: 0    cyanocobalamin (VITAMIN B-12) 500 mcg tablet, Take 500 mcg by mouth daily, Disp: , Rfl:     metFORMIN (GLUCOPHAGE) 500 mg tablet, Take 1,000 mg by mouth 2 (two) times a day with meals, Disp: , Rfl:     metoprolol succinate (TOPROL-XL) 100 mg 24 hr tablet, Take 100 mg by mouth 2 (two) times a day, Disp: , Rfl:     omeprazole (PriLOSEC) 20 mg delayed release capsule, Take 20 mg by mouth daily, Disp: , Rfl:     repaglinide (PRANDIN) 2 mg tablet, Take 1 tablet (2 mg total) by mouth 3 (three) times a day before meals, Disp: 90 tablet, Rfl: 0    sitaGLIPtin (JANUVIA) 100 mg tablet, Take 100 mg by mouth daily, Disp: , Rfl:     insulin glargine (LANTUS) 100 units/mL subcutaneous injection, Inject 25 Units under the skin every morning (Patient not taking: Reported on 12/31/2018 ), Disp: 10 mL, Rfl: 0  No current facility-administered medications for this visit       No Known Allergies    Review of Systems:  Review of Systems   Constitutional: Positive for fatigue and unexpected weight change (decreased 5 pounds )  Eating pureed foods   HENT: Positive for sore throat ("scratchy") and trouble swallowing  Eyes: Negative  Respiratory: Negative  Cardiovascular: Negative  Gastrointestinal: Positive for constipation  Flushing feeding tube with water daily  Endocrine: Negative  Genitourinary: Negative  Musculoskeletal: Negative  Skin: Negative  Allergic/Immunologic: Negative  Neurological: Negative  Hematological: Negative  Psychiatric/Behavioral: Negative  Vitals:    12/31/18 1200   BP: 132/84   Pulse: 74   Resp: 18   Temp: 98 1 °F (36 7 °C)   TempSrc: Temporal   SpO2: 98%   Weight: 114 kg (251 lb)   Height: 5' 10" (1 778 m)            Imaging:Ct Soft Tissue Neck W Contrast    Result Date: 12/26/2018  Narrative: CT NECK WITH CONTRAST INDICATION:   R59 0: Localized enlarged lymph nodes E04 2: Nontoxic multinodular goiter  COMPARISON:  No previous CT imaging of the neck  Previous ultrasound imaging dated 12/24/2018  TECHNIQUE:  Axial, sagittal, and coronal 2D reformatted images were created from the axial source data and submitted for interpretation  Radiation dose length product (DLP) for this visit:  741 mGy-cm   This examination, like all CT scans performed in the [facility], was performed utilizing techniques to minimize radiation dose exposure, including the use of iterative reconstruction and automated exposure control  IV Contrast:  100 mL of iohexol (OMNIPAQUE) IMAGE QUALITY:  Diagnostic  FINDINGS: VISUALIZED BRAIN PARENCHYMA:  No acute intracranial pathology of the visualized brain parenchyma  VISUALIZED ORBITS AND PARANASAL SINUSES:  Normal  NASAL CAVITY AND NASOPHARYNX:  Normal  THYROID GLAND:  Heterogeneous mass identified within the left aspect of the thyroid gland  The mass extends posteriorly and is inseparable from a thickened upper esophagus    The thickened upper esophagus extends inferiorly below the level of the thyroid  mass into the upper mediastinum  The right lobe of the thyroid gland is heterogeneous as well and there is mild thickening of the isthmus  SUPRAHYOID NECK:  Normal oral cavity, tongue base, tonsillar fossa and epiglottis  INFRAHYOID NECK:  Normal aryepiglottic folds and piriform sinuses  Unremarkable glottis  The above described left-sided thyroid mass displaces the subglottic trachea towards the right and results in mild narrowing  PAROTID AND SUBMANDIBULAR GLANDS:  Normal  LYMPH NODES:  Slightly heterogeneously enhancing node within the left level 2 distribution, series 3 image 56 demonstrates slightly indistinct margins and measures 1 3 x 1 9 cm  Additional abnormal lymph nodes are noted demonstrating heterogeneous enhancement including a supraclavicular heterogeneously enhancing node measuring 1 7 x 1 3 cm  Abnormal nodes are primarily left-sided  There are multiple small right-sided nodes which are not enlarged by CT criteria  There are nodules identified within the thoracic inlet inseparable from the undersurface of the left thyroid mass suggesting abnormal nodes  VASCULAR STRUCTURES:  Mild displacement of the common carotid arteries by the thyroid mass described above  Normal enhancement of the vasculature without stenosis  THORACIC INLET:  There is a pleural-based mass within the left lateral apex measuring approximately 2 8 x 1 6 cm with adjacent interstitial thickening  Pulmonary nodule noted slightly more inferiorly within the left upper lobe on image 107 measures 6 mm  Ill-defined nodular density within the medial left upper lobe measures 9 mm in long axis  Additional pulmonary nodule noted within the left superior segment of the lower lobe on image 127 measuring 12 mm  Partially visualized nodule within the posterior right upper lobe on image 129   4 mm right upper lobe pulmonary nodule on image 113  See separate CT chest report   BONY STRUCTURES: Within the C7 vertebral body inferiorly there is a focal lucency with no sclerotic border  This is nonspecific and may represent atypical hemangioma  However, osseous metastatic lesion is not excluded  Impression: Diffusely heterogeneous thyroid gland significantly enlarged on the left  This mass is inseparable from the upper esophagus which is diffusely thickened  The thickened esophagus extends inferiorly into the upper mediastinum  Heterogeneously enhancing pathologic adenopathy is noted primarily within the left neck but also in the upper mediastinum immediately below the left thyroid mass  Multiple pulmonary nodules are noted within the upper lobes, left greater than right as well as a pleural-based mass identified within the left apex  See separate CT chest report  Findings suggest thyroid carcinoma with possible direct extension to the upper esophagus, and pulmonary metastasis  Patient recently underwent thyroid and lymph node biopsies 12/24/2018  The study was marked in [facility] for immediate notification  Workstation performed: [id number]     Ct Chest W Contrast    Result Date: 12/26/2018  Narrative: CT CHEST WITH IV CONTRAST INDICATION:   R59 0: Localized enlarged lymph nodes  27-year-old man with enlarged cervical lymph nodes COMPARISON:  None  TECHNIQUE: CT examination of the chest was performed  Axial, sagittal, and coronal 2D reformatted images were created from the source data and submitted for interpretation  Radiation dose length product (DLP) for this visit:  976 mGy-cm   This examination, like all CT scans performed in the [facility], was performed utilizing techniques to minimize radiation dose exposure, including the use of iterative reconstruction and automated exposure control  IV Contrast:  100 mL of iohexol (OMNIPAQUE) FINDINGS: LUNGS:  There are numerous bilateral pulmonary nodules suspicious for metastatic disease   0 6 cm left upper lobe pulmonary nodule (series 302 image 70)  0 7 cm right lower lobe pulmonary nodule small central area of cavitation (series 302 image 94)  1 1 x 1 0 cm nodule superior segment left lower lobe (series 302 image 50) The left upper lobe tree-in-bud opacities, which may be infectious or inflammatory, immediately subjacent to a pleural-based mass  There is no tracheal or endobronchial lesion  However, there is moderate narrowing of the superior trachea to 2 left thyroid mass with the luminal diameter measuring 1 4 x 0 6 cm  PLEURA:  3 4 x 0 9 cm left apical pleural-based mass (series 604 image 124)  HEART/GREAT VESSELS:  Unremarkable for patient's age  MEDIASTINUM AND ADRI:  1 1 cm left paratracheal lymph node (series 4 image 12)  CHEST WALL AND LOWER NECK:   Left thyroid mass is incompletely included  This was recently biopsied  VISUALIZED STRUCTURES IN THE UPPER ABDOMEN:  Mild hepatic steatosis  Left kidney upper pole cyst  OSSEOUS STRUCTURES:  No acute fracture or destructive osseous lesion  Impression: 1  Heterogeneous left thyroid mass, which was recently biopsied  2   Findings worrisome for pulmonary and likely left pleural metastatic disease  3   Left upper lobe tree-in-bud opacities adjacent to pleural-based mass may be infectious or inflammatory  4   Mildly enlarged left paratracheal lymph node may additionally be metastatic  I personally discussed this study with Dr Sofia Devine on 12/26/2018 at 11:03 AM  Workstation performed: [id number]     Us Guided Lymph Node Biopsy Left    Result Date: 12/24/2018  Narrative: ULTRASOUND-GUIDED NECK LYMPH NODE BIOPSY HISTORY: 59year-old with history of recent ultrasound demonstrating left anterior jugular lymph node  Patient presents with prescription for biopsy of left anterior jugular lymph node  COMPARISON: Outside ultrasound December 12, 2018  FINDINGS: The procedure was explained to the patient, including risks of hemorrhage, infection and local injury  Informed consent was freely obtained  The patient verbalized expressed understanding of the above risks and wished to proceed with the procedure  Final standard "time-out" procedure performed  PROCEDURE: The neck was prepped and draped in normal sterile fashion  Under real-time ultrasound guidance and local anesthesia 6 passes with a 25 gauge needle were made through the left anterior jugular lymph node measuring today 2 4 x 1 3 x 1 4 cm  Cytopathology was present and deemed the specimens adequate for evaluation  The patient tolerated the procedure well  Postprocedure instructions were provided for the patient  I asked the patient to call us with any questions, concerns, or acute problems  The patient expressed understanding of the above  Impression: Status post successful ultrasound-guided biopsy of left anterior jugular chain lymph node  Procedure was performed by LOKI Cosby PA-C under the direct supervision of Dr Farhat Esquivel  Workstation performed: [id number]     Us Guided Thyroid Biopsy With Affirma    Result Date: 12/24/2018  Narrative: ULTRASOUND-GUIDED THYROID BIOPSY HISTORY: 59year-old with history of thyroid nodules, seen on a prior outside hospital ultrasound study  Patient presents with prescription for biopsy of left thyroid nodule(s) with Afrima testing  COMPARISON: Outside images December 12, 2018 FINDINGS: Prior ultrasound images were reviewed  The dominant left thyroid nodule was targeted for today's biopsy  In review of prior imaging from outside facilities as well as our recent imaging prior to procedure, the "2 separate nodules" described on the outside hospital ultrasound were favored to be part of a large dominant left thyroid lobe nodule  The procedure was explained to the patient, including risks of hemorrhage, infection and local injury  Informed consent was freely obtained  Final standard "time-out" procedure performed  PROCEDURE: The neck was prepped and draped in normal sterile fashion   Under real-time ultrasound guidance and local anesthesia 5 passes with a 25 gauge needle were made through the dominant left thyroid nodule measuring today 2 6 x 2 6 x 2 6 cm  Both areas of the nodule described as "separate nodules" on the outside hospital were sampled  Cytopathology was present and deemed the specimens adequate for evaluation  The patient tolerated the procedure well  Postprocedure instructions were provided for the patient  Impression: Status post successful ultrasound-guided thyroid biopsy  Procedure was performed by LOKI Mazariegos PA-C under the direct supervision of Dr Joey Nevarez  Workstation performed: [id number]       Teaching:  [facility] RT packet with RT for H/N education given

## 2018-12-31 NOTE — PROGRESS NOTES
Consultation - Radiation Oncology     IXE:[id number] : 1955  Encounter: [id number]  Patient Information: Marquis Guerrero      CHIEF COMPLAINT  Chief Complaint   Patient presents with    Consult     radiation oncology     Cancer Staging  Stage IV thyroid carcinoma with left neck and upper mediastinal lymphadenopathy and pulmonary metastasis  History of Present Illness   Marquis Guerrero is a 59y o  year old male who is seen for initial radiation oncology consult for newly diagnosed metastatic undifferentiated thyroid cancer  He was seen at [facility] urgent care 18 and had negative Chest x-ray  He presented to [facility] urgent care again on 18 with complaints of having difficulty swallowing for 1 5 weeks, cough and chocking sensation, sore throat from coughing, feeling of a lump in throat  He has history of diabetes mellitus, hypertension, and chronic GERD and has taken Prilosec for many years  He had EGD done many years ago  18 Thyroid US:   Impression: Enlarged left thyroid lobe, with 2 solid nodules, ultrasound-guided biopsy recommended   Mildly enlarged and abnormal appearing left neck lymph node lateral to the left lobe, and ultrasound-guided biopsy should also be considered      18 US-guided left thyroid and left anterior jugular chain lymph node biopsy positive for undifferentiated thyroid carcinoma      18 CT soft tissue neck:  IMPRESSION:   Diffusely heterogeneous thyroid gland significantly enlarged on the left   This mass is inseparable from the upper esophagus which is diffusely thickened   The thickened esophagus extends inferiorly into the upper mediastinum   Heterogeneously enhancing   pathologic adenopathy is noted primarily within the left neck but also in the upper mediastinum immediately below the left thyroid mass      Multiple pulmonary nodules are noted within the upper lobes, left greater than right as well as a pleural-based mass identified within the left apex   See separate CT chest report      Findings suggest thyroid carcinoma with possible direct extension to the upper esophagus, and pulmonary metastasis   Patient recently underwent thyroid and lymph node biopsies 12/24/2018 12/24/18/ CT chest:  IMPRESSION:  1   Heterogeneous left thyroid mass, which was recently biopsied  2   Findings worrisome for pulmonary and likely left pleural metastatic disease     3   Left upper lobe tree-in-bud opacities adjacent to pleural-based mass may be infectious or inflammatory  4   Mildly enlarged left paratracheal lymph node may additionally be metastatic      12/27/18 sent to ED by Dr Sami Baez to expedite evaluation for feeding tube, progressive worsening dysphagia, unable to swallow anything thicker than water, unable to swallow pills  He was seen by Dr Cierra Martinez for GI consult         12/28/18 EGD with PEG tube placement  Findings:  1  Esophagus and GEJ- the esophagus was compressed at the 20-21 cm hope  There was resistance to passage of the scope   No tumor was seen  2  Stomach- a few polyps were seen in the stomach suggestive of fundic gland polyps  3  Duodenum- the duodenal bulb and the 2nd portion appeared to be normal      Patient eating pureed foods and drinking liquids  Crushing pills  He reports "scratchy" feeling in throat when swallowing foods  Feeding tube is in place, flushing daily with water  Last seen by dentist in November 2018, patient reports he has no dental problems  He was seen today with his wife and daughter  He reports he quit smoking approximately 30 years ago  He denies any shortness of breath nor hemoptysis  He has had a raspy voice since early December  He has lost about 4-5 lb  He denies any previous history of cancer including no skin cancer  Denies previous radiation therapy nor any chemotherapy  He is retired from [employer] after working for 27 years where he had some gas exposure    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  His father had a history of prostate adenocarcinoma but  at [de-identified] due to a brain aneurysm  His mother had a history of goiter but no history of any cancer  She  at 80 from Alzheimer's disease  Future Appointments:  19 Dr Ryan Tobar  19 Dr Raymond Acosta      Primary cancer of thyroid with metastasis to other site [facility])    2018 Initial Diagnosis     Primary cancer of thyroid with metastasis to other site [facility])         2018 Biopsy     A B  Thyroid, Left, mid pole ( ThinPrep and smear preparations ):  Malignant ([place] Category VI) - See note  Undifferentiated carcinoma  C D E  Neck, left anterior jugular ( ThinPrep, smear preparations and cell block ):  Conclusive evidence of malignancy  Undifferentiated carcinoma, see note  Clinical Trial: no     Screening  Tobacco  Current tobacco user: no  If yes, brief counseling provided: NA     Hypertension  Hypertension screening performed: yes  Normotensive:  no  If no, referred to PCP: no     Depression Screening  Screened for depression using PHQ-2: yes     Screened for depression using PHQ-9:  no  Screening positive or negative:  negative  If score >4, was any of the following actions taken? Additional evaluation for depression, suicide risk assesment, referral to PCP or psychiatry, medication started:  n/a     Advanced Care Planning for Patients >65 years  Advanced Care Planning Discussed:  n/a  Patient named surrogate decision maker or care plan in chart: n/a    Past Medical History:   Diagnosis Date    Cancer ([facility] [address] )     Diabetes mellitus ([facility] )     GERD (gastroesophageal reflux disease)     Hypertension      Past Surgical History:   Procedure Laterality Date    COLONOSCOPY      ESOPHAGOGASTRODUODENOSCOPY N/A 2018    Procedure: ESOPHAGOGASTRODUODENOSCOPY (EGD) with PEG placement;  Surgeon: Gloria Wilson MD;  Location: [facility];   Service: Gastroenterology   [address] [place] [address] FRACTURE SURGERY Bilateral 2008    b/l legs    HERNIA REPAIR      LEG SURGERY Bilateral     US GUIDED LYMPH NODE BIOPSY LEFT  12/24/2018    US GUIDED THYROID BIOPSY  12/24/2018       Family History   Problem Relation Age of Onset    Thyroid disease Mother     Prostate cancer Father        Social History   History   Alcohol Use No     History   Drug Use No     History   Smoking Status    Former Smoker   Smokeless Tobacco    Never Used     Comment: Former chewing tobacco use     Meds/Allergies     Current Outpatient Prescriptions:     amLODIPine-benazepril (LOTREL 5-20) 5-20 MG per capsule, Take 1 capsule by mouth 2 (two) times a day, Disp: , Rfl:     aspirin (ECOTRIN LOW STRENGTH) 81 mg EC tablet, Take 81 mg by mouth daily, Disp: , Rfl:     cloNIDine (CATAPRES-TTS-3) 0 3 mg/24 hr, Place 1 patch (0 3 mg total) on the skin once a week, Disp: 4 patch, Rfl: 0    cyanocobalamin (VITAMIN B-12) 500 mcg tablet, Take 500 mcg by mouth daily, Disp: , Rfl:     metFORMIN (GLUCOPHAGE) 500 mg tablet, Take 1,000 mg by mouth 2 (two) times a day with meals, Disp: , Rfl:     metoprolol succinate (TOPROL-XL) 100 mg 24 hr tablet, Take 100 mg by mouth 2 (two) times a day, Disp: , Rfl:     omeprazole (PriLOSEC) 20 mg delayed release capsule, Take 20 mg by mouth daily, Disp: , Rfl:     repaglinide (PRANDIN) 2 mg tablet, Take 1 tablet (2 mg total) by mouth 3 (three) times a day before meals, Disp: 90 tablet, Rfl: 0    sitaGLIPtin (JANUVIA) 100 mg tablet, Take 100 mg by mouth daily, Disp: , Rfl:     insulin glargine (LANTUS) 100 units/mL subcutaneous injection, Inject 25 Units under the skin every morning (Patient not taking: Reported on 12/31/2018 ), Disp: 10 mL, Rfl: 0  No current facility-administered medications for this visit  No Known Allergies    Review of Systems  Constitutional: Positive for fatigue and unexpected weight change (decreased 5 pounds )          Eating pureed foods   HENT: Positive for sore throat ("scratchy") and trouble swallowing  Eyes: Negative  Respiratory: Negative  Cardiovascular: Negative  Gastrointestinal: Positive for constipation  Flushing feeding tube with water daily  Endocrine: Negative  Genitourinary: Negative  Musculoskeletal: Negative  Skin: Negative  Allergic/Immunologic: Negative  Neurological: Negative  Hematological: Negative  Psychiatric/Behavioral: Negative  OBJECTIVE:   /84   Pulse 74   Temp 98 1 °F (36 7 °C) (Temporal)   Resp 18   Ht 5' 10" (1 778 m)   Wt 114 kg (251 lb)   SpO2 98%   BMI 36 01 kg/m²   Pain Assessment:  0  Performance Status: ECOG/[place]ub[place]/WHO: 1 - Symptomatic but completely ambulatory    Physical Exam   Constitutional: He is oriented to person, place, and time  He appears well-developed and well-nourished  No distress  HENT:   Head: Normocephalic and atraumatic  Mouth/Throat: Oropharynx is clear and moist  No oropharyngeal exudate  Eyes: Pupils are equal, round, and reactive to light  Conjunctivae and EOM are normal  No scleral icterus  Neck: Normal range of motion  Neck supple  No tracheal deviation present  No thyromegaly present  Left upper neck 2cm DARLEEN lymph node that is nontender  Cardiovascular: Normal rate, regular rhythm and normal heart sounds  Pulmonary/Chest: Effort normal and breath sounds normal  No respiratory distress  He has no wheezes  He has no rales  He exhibits no tenderness  Abdominal: Soft  Bowel sounds are normal  He exhibits no distension and no mass  There is no tenderness  Musculoskeletal: Normal range of motion  He exhibits no edema or tenderness  Lymphadenopathy:     He has no cervical adenopathy  Neurological: He is alert and oriented to person, place, and time  No cranial nerve deficit  Coordination normal    Skin: Skin is warm and dry  No rash noted  He is not diaphoretic  No erythema  No pallor  Psychiatric: He has a normal mood and affect   His behavior is normal  Judgment and thought content normal    Nursing note and vitals reviewed  RESULTS  Lab Results    Chemistry        Component Value Date/Time    K 3 9 12/30/2018 0534    CL 98 (L) 12/30/2018 0534    CO2 24 12/30/2018 0534    BUN 14 12/30/2018 0534    CREATININE 1 32 (H) 12/30/2018 0534        Component Value Date/Time    CALCIUM 8 5 12/30/2018 0534    ALKPHOS 91 12/30/2018 0534    AST 18 12/30/2018 0534    ALT 13 12/30/2018 0534            Lab Results   Component Value Date    WBC 7 72 12/29/2018    HGB 13 6 12/29/2018    HCT 38 5 12/29/2018    MCV 90 12/29/2018     12/29/2018         Imaging Studies  Ct Soft Tissue Neck W Contrast    Result Date: 12/26/2018  Narrative: CT NECK WITH CONTRAST INDICATION:   R59 0: Localized enlarged lymph nodes E04 2: Nontoxic multinodular goiter  COMPARISON:  No previous CT imaging of the neck  Previous ultrasound imaging dated 12/24/2018  TECHNIQUE:  Axial, sagittal, and coronal 2D reformatted images were created from the axial source data and submitted for interpretation  Radiation dose length product (DLP) for this visit:  741 mGy-cm   This examination, like all CT scans performed in the [facility], was performed utilizing techniques to minimize radiation dose exposure, including the use of iterative reconstruction and automated exposure control  IV Contrast:  100 mL of iohexol (OMNIPAQUE) IMAGE QUALITY:  Diagnostic  FINDINGS: VISUALIZED BRAIN PARENCHYMA:  No acute intracranial pathology of the visualized brain parenchyma  VISUALIZED ORBITS AND PARANASAL SINUSES:  Normal  NASAL CAVITY AND NASOPHARYNX:  Normal  THYROID GLAND:  Heterogeneous mass identified within the left aspect of the thyroid gland  The mass extends posteriorly and is inseparable from a thickened upper esophagus  The thickened upper esophagus extends inferiorly below the level of the thyroid  mass into the upper mediastinum    The right lobe of the thyroid gland is heterogeneous as well and there is mild thickening of the isthmus  SUPRAHYOID NECK:  Normal oral cavity, tongue base, tonsillar fossa and epiglottis  INFRAHYOID NECK:  Normal aryepiglottic folds and piriform sinuses  Unremarkable glottis  The above described left-sided thyroid mass displaces the subglottic trachea towards the right and results in mild narrowing  PAROTID AND SUBMANDIBULAR GLANDS:  Normal  LYMPH NODES:  Slightly heterogeneously enhancing node within the left level 2 distribution, series 3 image 56 demonstrates slightly indistinct margins and measures 1 3 x 1 9 cm  Additional abnormal lymph nodes are noted demonstrating heterogeneous enhancement including a supraclavicular heterogeneously enhancing node measuring 1 7 x 1 3 cm  Abnormal nodes are primarily left-sided  There are multiple small right-sided nodes which are not enlarged by CT criteria  There are nodules identified within the thoracic inlet inseparable from the undersurface of the left thyroid mass suggesting abnormal nodes  VASCULAR STRUCTURES:  Mild displacement of the common carotid arteries by the thyroid mass described above  Normal enhancement of the vasculature without stenosis  THORACIC INLET:  There is a pleural-based mass within the left lateral apex measuring approximately 2 8 x 1 6 cm with adjacent interstitial thickening  Pulmonary nodule noted slightly more inferiorly within the left upper lobe on image 107 measures 6 mm  Ill-defined nodular density within the medial left upper lobe measures 9 mm in long axis  Additional pulmonary nodule noted within the left superior segment of the lower lobe on image 127 measuring 12 mm  Partially visualized nodule within the posterior right upper lobe on image 129   4 mm right upper lobe pulmonary nodule on image 113  See separate CT chest report  BONY STRUCTURES: Within the C7 vertebral body inferiorly there is a focal lucency with no sclerotic border    This is nonspecific and may represent atypical hemangioma  However, osseous metastatic lesion is not excluded  Impression: Diffusely heterogeneous thyroid gland significantly enlarged on the left  This mass is inseparable from the upper esophagus which is diffusely thickened  The thickened esophagus extends inferiorly into the upper mediastinum  Heterogeneously enhancing pathologic adenopathy is noted primarily within the left neck but also in the upper mediastinum immediately below the left thyroid mass  Multiple pulmonary nodules are noted within the upper lobes, left greater than right as well as a pleural-based mass identified within the left apex  See separate CT chest report  Findings suggest thyroid carcinoma with possible direct extension to the upper esophagus, and pulmonary metastasis  Patient recently underwent thyroid and lymph node biopsies 12/24/2018  The study was marked in [facility] for immediate notification  Workstation performed: [id number]     Ct Chest W Contrast    Result Date: 12/26/2018  Narrative: CT CHEST WITH IV CONTRAST INDICATION:   R59 0: Localized enlarged lymph nodes  71-year-old man with enlarged cervical lymph nodes COMPARISON:  None  TECHNIQUE: CT examination of the chest was performed  Axial, sagittal, and coronal 2D reformatted images were created from the source data and submitted for interpretation  Radiation dose length product (DLP) for this visit:  976 mGy-cm   This examination, like all CT scans performed in the [facility], was performed utilizing techniques to minimize radiation dose exposure, including the use of iterative reconstruction and automated exposure control  IV Contrast:  100 mL of iohexol (OMNIPAQUE) FINDINGS: LUNGS:  There are numerous bilateral pulmonary nodules suspicious for metastatic disease  0 6 cm left upper lobe pulmonary nodule (series 302 image 70)  0 7 cm right lower lobe pulmonary nodule small central area of cavitation (series 302 image 94)   1 1 x 1 0 cm nodule superior segment left lower lobe (series 302 image 50) The left upper lobe tree-in-bud opacities, which may be infectious or inflammatory, immediately subjacent to a pleural-based mass  There is no tracheal or endobronchial lesion  However, there is moderate narrowing of the superior trachea to 2 left thyroid mass with the luminal diameter measuring 1 4 x 0 6 cm  PLEURA:  3 4 x 0 9 cm left apical pleural-based mass (series 604 image 124)  HEART/GREAT VESSELS:  Unremarkable for patient's age  MEDIASTINUM AND ADRI:  1 1 cm left paratracheal lymph node (series 4 image 12)  CHEST WALL AND LOWER NECK:   Left thyroid mass is incompletely included  This was recently biopsied  VISUALIZED STRUCTURES IN THE UPPER ABDOMEN:  Mild hepatic steatosis  Left kidney upper pole cyst  OSSEOUS STRUCTURES:  No acute fracture or destructive osseous lesion  Impression: 1  Heterogeneous left thyroid mass, which was recently biopsied  2   Findings worrisome for pulmonary and likely left pleural metastatic disease  3   Left upper lobe tree-in-bud opacities adjacent to pleural-based mass may be infectious or inflammatory  4   Mildly enlarged left paratracheal lymph node may additionally be metastatic  I personally discussed this study with Dr Sade Peres on 12/26/2018 at 11:03 AM  Workstation performed: [id number]     Us Guided Lymph Node Biopsy Left    Result Date: 12/24/2018  Narrative: ULTRASOUND-GUIDED NECK LYMPH NODE BIOPSY HISTORY: 59year-old with history of recent ultrasound demonstrating left anterior jugular lymph node  Patient presents with prescription for biopsy of left anterior jugular lymph node  COMPARISON: Outside ultrasound December 12, 2018  FINDINGS: The procedure was explained to the patient, including risks of hemorrhage, infection and local injury  Informed consent was freely obtained  The patient verbalized expressed understanding of the above risks and wished to proceed with the procedure   Final standard "time-out" procedure performed  PROCEDURE: The neck was prepped and draped in normal sterile fashion  Under real-time ultrasound guidance and local anesthesia 6 passes with a 25 gauge needle were made through the left anterior jugular lymph node measuring today 2 4 x 1 3 x 1 4 cm  Cytopathology was present and deemed the specimens adequate for evaluation  The patient tolerated the procedure well  Postprocedure instructions were provided for the patient  I asked the patient to call us with any questions, concerns, or acute problems  The patient expressed understanding of the above  Impression: Status post successful ultrasound-guided biopsy of left anterior jugular chain lymph node  Procedure was performed by LOKI Luis PA-C under the direct supervision of Dr Shalonda Kumar  Workstation performed: [id number]     Us Guided Thyroid Biopsy With Affirma    Result Date: 12/24/2018  Narrative: ULTRASOUND-GUIDED THYROID BIOPSY HISTORY: 59year-old with history of thyroid nodules, seen on a prior outside hospital ultrasound study  Patient presents with prescription for biopsy of left thyroid nodule(s) with Afrima testing  COMPARISON: Outside images December 12, 2018 FINDINGS: Prior ultrasound images were reviewed  The dominant left thyroid nodule was targeted for today's biopsy  In review of prior imaging from outside facilities as well as our recent imaging prior to procedure, the "2 separate nodules" described on the outside hospital ultrasound were favored to be part of a large dominant left thyroid lobe nodule  The procedure was explained to the patient, including risks of hemorrhage, infection and local injury  Informed consent was freely obtained  Final standard "time-out" procedure performed  PROCEDURE: The neck was prepped and draped in normal sterile fashion   Under real-time ultrasound guidance and local anesthesia 5 passes with a 25 gauge needle were made through the dominant left thyroid nodule measuring today 2 6 x 2 6 x 2 6 cm  Both areas of the nodule described as "separate nodules" on the outside hospital were sampled  Cytopathology was present and deemed the specimens adequate for evaluation  The patient tolerated the procedure well  Postprocedure instructions were provided for the patient  Impression: Status post successful ultrasound-guided thyroid biopsy  Procedure was performed by LOKI Beth PA-C under the direct supervision of Dr Angel Frederick  Workstation performed: [id number]     Pathology: See Above     ASSESSMENT  1  Primary cancer of thyroid with metastasis to other site [facility])       Cancer Staging  Stage IV thyroid carcinoma with left neck and upper mediastinal lymphadenopathy and pulmonary metastasis  PLAN/DISCUSSION  No orders of the defined types were placed in this encounter  Teofilo Up is a 59y o  year old male with a locally advanced thyroid carcinoma with left neck, supraclavicular and upper mediastinal lymphadenopathy in addition to multiple pulmonary nodules and a pleural based mass in the left apex all consistent with metastasis  CT of the neck showed his left thyroid mass was inseparable from the upper esophagus which was diffusely thickened  EGD revealed extrinsic compression of the esophagus without any gross tumor  He did have PEG tube placement  He has only been able to tolerate pureed and soft foods  He is not symptomatic from his pulmonary disease  His airway is intact and he does not require tracheostomy tube  We discussed the option of upfront surgical resection/debulking to be followed by postoperative radiation therapy but he does have multiple pulmonary nodules and lymphadenopathy such that all of his disease could not be resected  We discussed definitive radiation therapy alone versus definitive radiation therapy with concurrent chemotherapy over 7 weeks    We discussed that undifferentiated thyroid carcinomas can be aggressive and sometimes grow quickly even during treatment  A PET-CT study for staging purposes would be useful and then this could also be used in the future to monitor response to treatment  He is scheduled to see Dr Quoc Ambriz on January 2, 2019 as well as Dr Treva Gomez on January 4, 2019  He will also be seeing Dr Zachary Wheeler to consider his systemic chemotherapy treatment options  Yin Rodríguez MD  12/31/2018,1:58 PM      Portions of the record may have been created with voice recognition software   Occasional wrong word or "sound a like" substitutions may have occurred due to the inherent limitations of voice recognition software   Read the chart carefully and recognize, using context, where substitutions have occurred

## 2019-01-01 ENCOUNTER — TELEPHONE (OUTPATIENT)
Dept: NUTRITION | Facility: CLINIC | Age: 64
End: 2019-01-01

## 2019-01-01 ENCOUNTER — OFFICE VISIT (OUTPATIENT)
Dept: HEMATOLOGY ONCOLOGY | Facility: CLINIC | Age: 64
End: 2019-01-01
Payer: COMMERCIAL

## 2019-01-01 ENCOUNTER — HOSPITAL ENCOUNTER (INPATIENT)
Facility: HOSPITAL | Age: 64
LOS: 8 days | DRG: 643 | End: 2019-01-26
Attending: ANESTHESIOLOGY | Admitting: EMERGENCY MEDICINE
Payer: COMMERCIAL

## 2019-01-01 ENCOUNTER — HOSPITAL ENCOUNTER (OUTPATIENT)
Dept: RADIOLOGY | Facility: HOSPITAL | Age: 64
Discharge: HOME | DRG: 626 | End: 2019-01-17
Attending: RADIOLOGY | Admitting: RADIOLOGY
Payer: COMMERCIAL

## 2019-01-01 ENCOUNTER — DOCUMENTATION (OUTPATIENT)
Dept: NUTRITION | Facility: HOSPITAL | Age: 64
End: 2019-01-01

## 2019-01-01 ENCOUNTER — TELEPHONE (OUTPATIENT)
Dept: HEMATOLOGY ONCOLOGY | Facility: CLINIC | Age: 64
End: 2019-01-01

## 2019-01-01 ENCOUNTER — APPOINTMENT (OUTPATIENT)
Dept: LAB | Facility: MEDICAL CENTER | Age: 64
End: 2019-01-01
Payer: COMMERCIAL

## 2019-01-01 ENCOUNTER — APPOINTMENT (INPATIENT)
Dept: RADIOLOGY | Facility: HOSPITAL | Age: 64
DRG: 643 | End: 2019-01-01
Payer: COMMERCIAL

## 2019-01-01 ENCOUNTER — TELEPHONE (OUTPATIENT)
Dept: RADIOLOGY | Facility: HOSPITAL | Age: 64
End: 2019-01-01

## 2019-01-01 ENCOUNTER — APPOINTMENT (OUTPATIENT)
Dept: RADIATION ONCOLOGY | Facility: CLINIC | Age: 64
End: 2019-01-01
Attending: RADIOLOGY
Payer: COMMERCIAL

## 2019-01-01 ENCOUNTER — HOSPITAL ENCOUNTER (OUTPATIENT)
Dept: INFUSION CENTER | Facility: CLINIC | Age: 64
End: 2019-01-01

## 2019-01-01 ENCOUNTER — APPOINTMENT (INPATIENT)
Dept: RADIOLOGY | Facility: HOSPITAL | Age: 64
DRG: 626 | End: 2019-01-01
Payer: COMMERCIAL

## 2019-01-01 ENCOUNTER — TELEPHONE (OUTPATIENT)
Dept: INTERVENTIONAL RADIOLOGY/VASCULAR | Facility: HOSPITAL | Age: 64
End: 2019-01-01

## 2019-01-01 ENCOUNTER — ANESTHESIA (INPATIENT)
Dept: PERIOP | Facility: HOSPITAL | Age: 64
DRG: 626 | End: 2019-01-01
Payer: COMMERCIAL

## 2019-01-01 ENCOUNTER — APPOINTMENT (OUTPATIENT)
Dept: RADIOLOGY | Facility: HOSPITAL | Age: 64
DRG: 626 | End: 2019-01-01
Attending: INTERNAL MEDICINE
Payer: COMMERCIAL

## 2019-01-01 ENCOUNTER — HOSPITAL ENCOUNTER (INPATIENT)
Facility: HOSPITAL | Age: 64
LOS: 1 days | DRG: 626 | End: 2019-01-18
Attending: EMERGENCY MEDICINE | Admitting: INTERNAL MEDICINE
Payer: COMMERCIAL

## 2019-01-01 ENCOUNTER — APPOINTMENT (EMERGENCY)
Dept: CT IMAGING | Facility: HOSPITAL | Age: 64
DRG: 626 | End: 2019-01-01
Payer: COMMERCIAL

## 2019-01-01 ENCOUNTER — ANESTHESIA EVENT (INPATIENT)
Dept: PERIOP | Facility: HOSPITAL | Age: 64
DRG: 626 | End: 2019-01-01
Payer: COMMERCIAL

## 2019-01-01 ENCOUNTER — HOSPITAL ENCOUNTER (OUTPATIENT)
Dept: RADIOLOGY | Age: 64
Discharge: HOME | End: 2019-01-08
Payer: COMMERCIAL

## 2019-01-01 VITALS
TEMPERATURE: 98.3 F | HEART RATE: 71 BPM | WEIGHT: 239 LBS | OXYGEN SATURATION: 97 % | RESPIRATION RATE: 18 BRPM | SYSTOLIC BLOOD PRESSURE: 140 MMHG | BODY MASS INDEX: 35.4 KG/M2 | DIASTOLIC BLOOD PRESSURE: 94 MMHG | HEIGHT: 69 IN

## 2019-01-01 VITALS — BODY MASS INDEX: 34.44 KG/M2 | WEIGHT: 240 LBS

## 2019-01-01 VITALS
HEIGHT: 70 IN | RESPIRATION RATE: 26 BRPM | SYSTOLIC BLOOD PRESSURE: 152 MMHG | WEIGHT: 223.99 LBS | BODY MASS INDEX: 32.07 KG/M2 | HEART RATE: 80 BPM | TEMPERATURE: 98.9 F | OXYGEN SATURATION: 98 % | DIASTOLIC BLOOD PRESSURE: 75 MMHG

## 2019-01-01 VITALS
HEART RATE: 74 BPM | OXYGEN SATURATION: 94 % | SYSTOLIC BLOOD PRESSURE: 176 MMHG | DIASTOLIC BLOOD PRESSURE: 79 MMHG | RESPIRATION RATE: 17 BRPM

## 2019-01-01 VITALS
WEIGHT: 233.47 LBS | TEMPERATURE: 95.9 F | HEART RATE: 66 BPM | SYSTOLIC BLOOD PRESSURE: 138 MMHG | RESPIRATION RATE: 17 BRPM | OXYGEN SATURATION: 99 % | HEIGHT: 70 IN | BODY MASS INDEX: 33.42 KG/M2 | DIASTOLIC BLOOD PRESSURE: 75 MMHG

## 2019-01-01 DIAGNOSIS — C73 PRIMARY CANCER OF THYROID WITH METASTASIS TO OTHER SITE (HCC): Primary | ICD-10-CM

## 2019-01-01 DIAGNOSIS — C73 PRIMARY CANCER OF THYROID WITH METASTASIS TO OTHER SITE (HCC): ICD-10-CM

## 2019-01-01 DIAGNOSIS — J98.8 AIRWAY OBSTRUCTION: ICD-10-CM

## 2019-01-01 DIAGNOSIS — C73 THYROID CANCER (HCC): Primary | ICD-10-CM

## 2019-01-01 DIAGNOSIS — J39.8 TRACHEAL OBSTRUCTION: ICD-10-CM

## 2019-01-01 DIAGNOSIS — C73 MALIGNANT NEOPLASM OF THYROID GLAND (HCC): ICD-10-CM

## 2019-01-01 DIAGNOSIS — C73 THYROID CANCER (HCC): ICD-10-CM

## 2019-01-01 LAB
AFP-TM SERPL-MCNC: 1.6 NG/ML (ref 0.5–8)
ALBUMIN SERPL BCP-MCNC: 2.2 G/DL (ref 3.5–5)
ALBUMIN SERPL BCP-MCNC: 3.3 G/DL (ref 3.5–5)
ALP SERPL-CCNC: 86 U/L (ref 46–116)
ALP SERPL-CCNC: 87 U/L (ref 46–116)
ALT SERPL W P-5'-P-CCNC: 15 U/L (ref 12–78)
ALT SERPL W P-5'-P-CCNC: 44 U/L (ref 12–78)
ANION GAP SERPL CALCULATED.3IONS-SCNC: 15 MMOL/L (ref 4–13)
ANION GAP SERPL CALCULATED.3IONS-SCNC: 5 MMOL/L (ref 4–13)
ANION GAP SERPL CALCULATED.3IONS-SCNC: 5 MMOL/L (ref 4–13)
ANION GAP SERPL CALCULATED.3IONS-SCNC: 6 MMOL/L (ref 4–13)
ANION GAP SERPL CALCULATED.3IONS-SCNC: 8 MMOL/L (ref 4–13)
AST SERPL W P-5'-P-CCNC: 16 U/L (ref 5–45)
AST SERPL W P-5'-P-CCNC: 22 U/L (ref 5–45)
ATRIAL RATE: 81 BPM
ATRIAL RATE: 82 BPM
ATRIAL RATE: 82 BPM
BACTERIA NOSE AEROBE CULT: ABNORMAL
BACTERIA NOSE AEROBE CULT: ABNORMAL
BACTERIA UR QL AUTO: ABNORMAL /HPF
BASE EXCESS BLDA CALC-SCNC: -3.3 MMOL/L
BASOPHILS # BLD AUTO: 0.01 THOUSANDS/ΜL (ref 0–0.1)
BASOPHILS # BLD AUTO: 0.06 THOUSANDS/ΜL (ref 0–0.1)
BASOPHILS # BLD MANUAL: 0 THOUSAND/UL (ref 0–0.1)
BASOPHILS NFR BLD AUTO: 0 % (ref 0–1)
BASOPHILS NFR BLD AUTO: 1 % (ref 0–1)
BASOPHILS NFR MAR MANUAL: 0 % (ref 0–1)
BILIRUB DIRECT SERPL-MCNC: 0.37 MG/DL (ref 0–0.2)
BILIRUB SERPL-MCNC: 0.38 MG/DL (ref 0.2–1)
BILIRUB SERPL-MCNC: 1.19 MG/DL (ref 0.2–1)
BILIRUB UR QL STRIP: ABNORMAL
BUN SERPL-MCNC: 19 MG/DL (ref 5–25)
BUN SERPL-MCNC: 20 MG/DL (ref 5–25)
BUN SERPL-MCNC: 21 MG/DL (ref 5–25)
BUN SERPL-MCNC: 25 MG/DL (ref 5–25)
BUN SERPL-MCNC: 25 MG/DL (ref 5–25)
BUN SERPL-MCNC: 27 MG/DL (ref 5–25)
BUN SERPL-MCNC: 29 MG/DL (ref 5–25)
CALCIUM SERPL-MCNC: 7.9 MG/DL (ref 8.3–10.1)
CALCIUM SERPL-MCNC: 8.1 MG/DL (ref 8.3–10.1)
CALCIUM SERPL-MCNC: 8.2 MG/DL (ref 8.3–10.1)
CALCIUM SERPL-MCNC: 8.3 MG/DL (ref 8.3–10.1)
CALCIUM SERPL-MCNC: 8.4 MG/DL (ref 8.3–10.1)
CALCIUM SERPL-MCNC: 9.1 MG/DL (ref 8.3–10.1)
CALCIUM SERPL-MCNC: 9.9 MG/DL (ref 8.3–10.1)
CHLORIDE SERPL-SCNC: 100 MMOL/L (ref 100–108)
CHLORIDE SERPL-SCNC: 101 MMOL/L (ref 100–108)
CHLORIDE SERPL-SCNC: 102 MMOL/L (ref 100–108)
CHLORIDE SERPL-SCNC: 103 MMOL/L (ref 100–108)
CHLORIDE SERPL-SCNC: 96 MMOL/L (ref 100–108)
CHLORIDE SERPL-SCNC: 97 MMOL/L (ref 100–108)
CHLORIDE SERPL-SCNC: 98 MMOL/L (ref 100–108)
CHLORIDE SERPL-SCNC: 99 MMOL/L (ref 100–108)
CHLORIDE SERPL-SCNC: 99 MMOL/L (ref 100–108)
CLARITY UR: CLEAR
CO2 SERPL-SCNC: 20 MMOL/L (ref 21–32)
CO2 SERPL-SCNC: 24 MMOL/L (ref 21–32)
CO2 SERPL-SCNC: 25 MMOL/L (ref 21–32)
CO2 SERPL-SCNC: 25 MMOL/L (ref 21–32)
CO2 SERPL-SCNC: 26 MMOL/L (ref 21–32)
CO2 SERPL-SCNC: 29 MMOL/L (ref 21–32)
CO2 SERPL-SCNC: 30 MMOL/L (ref 21–32)
COLOR UR: YELLOW
COLOR, POC: YELLOW
CREAT SERPL-MCNC: 0.92 MG/DL (ref 0.6–1.3)
CREAT SERPL-MCNC: 1.06 MG/DL (ref 0.6–1.3)
CREAT SERPL-MCNC: 1.19 MG/DL (ref 0.6–1.3)
CREAT SERPL-MCNC: 1.24 MG/DL (ref 0.6–1.3)
CREAT SERPL-MCNC: 1.26 MG/DL (ref 0.6–1.3)
CREAT SERPL-MCNC: 1.33 MG/DL (ref 0.6–1.3)
CREAT SERPL-MCNC: 1.38 MG/DL (ref 0.6–1.3)
CREAT SERPL-MCNC: 1.38 MG/DL (ref 0.6–1.3)
CREAT SERPL-MCNC: 1.43 MG/DL (ref 0.6–1.3)
EOSINOPHIL # BLD AUTO: 0 THOUSAND/ΜL (ref 0–0.61)
EOSINOPHIL # BLD AUTO: 0.08 THOUSAND/ΜL (ref 0–0.61)
EOSINOPHIL # BLD AUTO: 0.1 THOUSAND/ΜL (ref 0–0.61)
EOSINOPHIL # BLD MANUAL: 0 THOUSAND/UL (ref 0–0.4)
EOSINOPHIL NFR BLD AUTO: 0 % (ref 0–6)
EOSINOPHIL NFR BLD AUTO: 1 % (ref 0–6)
EOSINOPHIL NFR BLD AUTO: 1 % (ref 0–6)
EOSINOPHIL NFR BLD MANUAL: 0 % (ref 0–6)
ERYTHROCYTE [DISTWIDTH] IN BLOOD BY AUTOMATED COUNT: 12.6 % (ref 11.6–15.1)
ERYTHROCYTE [DISTWIDTH] IN BLOOD BY AUTOMATED COUNT: 12.6 % (ref 11.6–15.1)
ERYTHROCYTE [DISTWIDTH] IN BLOOD BY AUTOMATED COUNT: 12.7 % (ref 11.6–15.1)
ERYTHROCYTE [DISTWIDTH] IN BLOOD BY AUTOMATED COUNT: 12.8 % (ref 11.6–15.1)
ERYTHROCYTE [DISTWIDTH] IN BLOOD BY AUTOMATED COUNT: 12.9 % (ref 11.6–15.1)
ERYTHROCYTE [DISTWIDTH] IN BLOOD BY AUTOMATED COUNT: 13 % (ref 11.6–15.1)
ERYTHROCYTE [DISTWIDTH] IN BLOOD BY AUTOMATED COUNT: 13.1 % (ref 11.6–15.1)
ERYTHROCYTE [DISTWIDTH] IN BLOOD BY AUTOMATED COUNT: 13.1 % (ref 11.6–15.1)
ERYTHROCYTE [DISTWIDTH] IN BLOOD BY AUTOMATED COUNT: 13.2 % (ref 11.6–15.1)
GFR SERPL CREATININE-BSD FRML MDRD: 52 ML/MIN/1.73SQ M
GFR SERPL CREATININE-BSD FRML MDRD: 54 ML/MIN/1.73SQ M
GFR SERPL CREATININE-BSD FRML MDRD: 54 ML/MIN/1.73SQ M
GFR SERPL CREATININE-BSD FRML MDRD: 57 ML/MIN/1.73SQ M
GFR SERPL CREATININE-BSD FRML MDRD: 61 ML/MIN/1.73SQ M
GFR SERPL CREATININE-BSD FRML MDRD: 62 ML/MIN/1.73SQ M
GFR SERPL CREATININE-BSD FRML MDRD: 65 ML/MIN/1.73SQ M
GFR SERPL CREATININE-BSD FRML MDRD: 75 ML/MIN/1.73SQ M
GFR SERPL CREATININE-BSD FRML MDRD: 89 ML/MIN/1.73SQ M
GLUCOSE SERPL-MCNC: 102 MG/DL (ref 65–140)
GLUCOSE SERPL-MCNC: 109 MG/DL (ref 65–140)
GLUCOSE SERPL-MCNC: 117 MG/DL (ref 65–140)
GLUCOSE SERPL-MCNC: 123 MG/DL (ref 65–140)
GLUCOSE SERPL-MCNC: 125 MG/DL (ref 65–140)
GLUCOSE SERPL-MCNC: 129 MG/DL (ref 65–140)
GLUCOSE SERPL-MCNC: 130 MG/DL (ref 65–140)
GLUCOSE SERPL-MCNC: 132 MG/DL (ref 65–140)
GLUCOSE SERPL-MCNC: 135 MG/DL (ref 65–140)
GLUCOSE SERPL-MCNC: 141 MG/DL (ref 65–140)
GLUCOSE SERPL-MCNC: 142 MG/DL (ref 65–140)
GLUCOSE SERPL-MCNC: 146 MG/DL (ref 65–140)
GLUCOSE SERPL-MCNC: 148 MG/DL (ref 65–140)
GLUCOSE SERPL-MCNC: 148 MG/DL (ref 65–140)
GLUCOSE SERPL-MCNC: 149 MG/DL (ref 65–140)
GLUCOSE SERPL-MCNC: 151 MG/DL (ref 65–140)
GLUCOSE SERPL-MCNC: 154 MG/DL (ref 65–140)
GLUCOSE SERPL-MCNC: 155 MG/DL (ref 65–140)
GLUCOSE SERPL-MCNC: 158 MG/DL (ref 65–140)
GLUCOSE SERPL-MCNC: 160 MG/DL (ref 65–140)
GLUCOSE SERPL-MCNC: 164 MG/DL (ref 65–140)
GLUCOSE SERPL-MCNC: 165 MG/DL (ref 65–140)
GLUCOSE SERPL-MCNC: 166 MG/DL (ref 65–140)
GLUCOSE SERPL-MCNC: 168 MG/DL (ref 65–140)
GLUCOSE SERPL-MCNC: 170 MG/DL (ref 65–140)
GLUCOSE SERPL-MCNC: 171 MG/DL (ref 65–140)
GLUCOSE SERPL-MCNC: 175 MG/DL (ref 65–140)
GLUCOSE SERPL-MCNC: 178 MG/DL (ref 65–140)
GLUCOSE SERPL-MCNC: 178 MG/DL (ref 65–140)
GLUCOSE SERPL-MCNC: 180 MG/DL (ref 65–140)
GLUCOSE SERPL-MCNC: 180 MG/DL (ref 65–140)
GLUCOSE SERPL-MCNC: 188 MG/DL (ref 65–140)
GLUCOSE SERPL-MCNC: 190 MG/DL (ref 65–140)
GLUCOSE SERPL-MCNC: 192 MG/DL (ref 65–140)
GLUCOSE SERPL-MCNC: 192 MG/DL (ref 65–140)
GLUCOSE SERPL-MCNC: 193 MG/DL (ref 65–140)
GLUCOSE SERPL-MCNC: 195 MG/DL (ref 65–140)
GLUCOSE SERPL-MCNC: 195 MG/DL (ref 65–140)
GLUCOSE SERPL-MCNC: 200 MG/DL (ref 65–140)
GLUCOSE SERPL-MCNC: 201 MG/DL (ref 65–140)
GLUCOSE SERPL-MCNC: 202 MG/DL (ref 65–140)
GLUCOSE SERPL-MCNC: 204 MG/DL (ref 65–140)
GLUCOSE SERPL-MCNC: 206 MG/DL (ref 65–140)
GLUCOSE SERPL-MCNC: 207 MG/DL (ref 65–140)
GLUCOSE SERPL-MCNC: 211 MG/DL (ref 65–140)
GLUCOSE SERPL-MCNC: 212 MG/DL (ref 65–140)
GLUCOSE SERPL-MCNC: 217 MG/DL (ref 65–140)
GLUCOSE SERPL-MCNC: 220 MG/DL (ref 65–140)
GLUCOSE SERPL-MCNC: 220 MG/DL (ref 65–140)
GLUCOSE SERPL-MCNC: 221 MG/DL (ref 65–140)
GLUCOSE SERPL-MCNC: 223 MG/DL (ref 65–140)
GLUCOSE SERPL-MCNC: 223 MG/DL (ref 65–140)
GLUCOSE SERPL-MCNC: 235 MG/DL (ref 65–140)
GLUCOSE SERPL-MCNC: 237 MG/DL (ref 65–140)
GLUCOSE SERPL-MCNC: 238 MG/DL (ref 65–140)
GLUCOSE SERPL-MCNC: 243 MG/DL (ref 65–140)
GLUCOSE SERPL-MCNC: 244 MG/DL (ref 65–140)
GLUCOSE SERPL-MCNC: 245 MG/DL (ref 65–140)
GLUCOSE SERPL-MCNC: 247 MG/DL (ref 65–140)
GLUCOSE SERPL-MCNC: 249 MG/DL (ref 65–140)
GLUCOSE SERPL-MCNC: 255 MG/DL (ref 65–140)
GLUCOSE SERPL-MCNC: 257 MG/DL (ref 65–140)
GLUCOSE SERPL-MCNC: 259 MG/DL (ref 65–140)
GLUCOSE SERPL-MCNC: 263 MG/DL (ref 65–140)
GLUCOSE SERPL-MCNC: 264 MG/DL (ref 65–140)
GLUCOSE SERPL-MCNC: 274 MG/DL (ref 65–140)
GLUCOSE SERPL-MCNC: 284 MG/DL (ref 65–140)
GLUCOSE SERPL-MCNC: 286 MG/DL (ref 65–140)
GLUCOSE SERPL-MCNC: 286 MG/DL (ref 65–140)
GLUCOSE SERPL-MCNC: 312 MG/DL (ref 65–140)
GLUCOSE SERPL-MCNC: 313 MG/DL (ref 65–140)
GLUCOSE SERPL-MCNC: 66 MG/DL (ref 65–140)
GLUCOSE SERPL-MCNC: 92 MG/DL (ref 65–140)
GLUCOSE UR STRIP-MCNC: NEGATIVE MG/DL
HCG-TM SERPL-SCNC: 6 MLU/ML
HCO3 BLDA-SCNC: 20.3 MMOL/L (ref 22–28)
HCT VFR BLD AUTO: 29.8 % (ref 36.5–49.3)
HCT VFR BLD AUTO: 30.5 % (ref 36.5–49.3)
HCT VFR BLD AUTO: 30.8 % (ref 36.5–49.3)
HCT VFR BLD AUTO: 31.5 % (ref 36.5–49.3)
HCT VFR BLD AUTO: 31.8 % (ref 36.5–49.3)
HCT VFR BLD AUTO: 32.4 % (ref 36.5–49.3)
HCT VFR BLD AUTO: 34.9 % (ref 36.5–49.3)
HCT VFR BLD AUTO: 36.9 % (ref 36.5–49.3)
HCT VFR BLD AUTO: 38.6 % (ref 36.5–49.3)
HGB BLD-MCNC: 10 G/DL (ref 12–17)
HGB BLD-MCNC: 10.3 G/DL (ref 12–17)
HGB BLD-MCNC: 10.5 G/DL (ref 12–17)
HGB BLD-MCNC: 10.5 G/DL (ref 12–17)
HGB BLD-MCNC: 10.7 G/DL (ref 12–17)
HGB BLD-MCNC: 10.9 G/DL (ref 12–17)
HGB BLD-MCNC: 12 G/DL (ref 12–17)
HGB BLD-MCNC: 12.7 G/DL (ref 12–17)
HGB BLD-MCNC: 13.4 G/DL (ref 12–17)
HGB UR QL STRIP.AUTO: NEGATIVE
HOROWITZ INDEX BLDA+IHG-RTO: 80 MM[HG]
IMM GRANULOCYTES # BLD AUTO: 0.05 THOUSAND/UL (ref 0–0.2)
IMM GRANULOCYTES # BLD AUTO: 0.05 THOUSAND/UL (ref 0–0.2)
IMM GRANULOCYTES # BLD AUTO: 0.06 THOUSAND/UL (ref 0–0.2)
IMM GRANULOCYTES # BLD AUTO: 0.07 THOUSAND/UL (ref 0–0.2)
IMM GRANULOCYTES # BLD AUTO: 0.09 THOUSAND/UL (ref 0–0.2)
IMM GRANULOCYTES # BLD AUTO: 0.09 THOUSAND/UL (ref 0–0.2)
IMM GRANULOCYTES NFR BLD AUTO: 1 % (ref 0–2)
IMM GRANULOCYTES NFR BLD AUTO: 2 % (ref 0–2)
KETONES UR STRIP-MCNC: ABNORMAL MG/DL
LDH SERPL-CCNC: 207 U/L (ref 81–234)
LEUKOCYTE ESTERASE UR QL STRIP: NEGATIVE
LYMPHOCYTES # BLD AUTO: 0.27 THOUSANDS/ΜL (ref 0.6–4.47)
LYMPHOCYTES # BLD AUTO: 0.34 THOUSANDS/ΜL (ref 0.6–4.47)
LYMPHOCYTES # BLD AUTO: 0.45 THOUSANDS/ΜL (ref 0.6–4.47)
LYMPHOCYTES # BLD AUTO: 0.58 THOUSAND/UL (ref 0.6–4.47)
LYMPHOCYTES # BLD AUTO: 0.58 THOUSANDS/ΜL (ref 0.6–4.47)
LYMPHOCYTES # BLD AUTO: 1.01 THOUSANDS/ΜL (ref 0.6–4.47)
LYMPHOCYTES # BLD AUTO: 1.35 THOUSANDS/ΜL (ref 0.6–4.47)
LYMPHOCYTES # BLD AUTO: 7 % (ref 14–44)
LYMPHOCYTES NFR BLD AUTO: 10 % (ref 14–44)
LYMPHOCYTES NFR BLD AUTO: 13 % (ref 14–44)
LYMPHOCYTES NFR BLD AUTO: 5 % (ref 14–44)
LYMPHOCYTES NFR BLD AUTO: 6 % (ref 14–44)
LYMPHOCYTES NFR BLD AUTO: 7 % (ref 14–44)
LYMPHOCYTES NFR BLD AUTO: 9 % (ref 14–44)
MAGNESIUM SERPL-MCNC: 2.1 MG/DL (ref 1.6–2.6)
MAGNESIUM SERPL-MCNC: 2.2 MG/DL (ref 1.6–2.6)
MAGNESIUM SERPL-MCNC: 2.3 MG/DL (ref 1.6–2.6)
MCH RBC QN AUTO: 30.8 PG (ref 26.8–34.3)
MCH RBC QN AUTO: 30.9 PG (ref 26.8–34.3)
MCH RBC QN AUTO: 31 PG (ref 26.8–34.3)
MCH RBC QN AUTO: 31 PG (ref 26.8–34.3)
MCH RBC QN AUTO: 31.2 PG (ref 26.8–34.3)
MCH RBC QN AUTO: 31.2 PG (ref 26.8–34.3)
MCH RBC QN AUTO: 31.3 PG (ref 26.8–34.3)
MCH RBC QN AUTO: 31.3 PG (ref 26.8–34.3)
MCH RBC QN AUTO: 31.4 PG (ref 26.8–34.3)
MCHC RBC AUTO-ENTMCNC: 33 G/DL (ref 31.4–37.4)
MCHC RBC AUTO-ENTMCNC: 33.3 G/DL (ref 31.4–37.4)
MCHC RBC AUTO-ENTMCNC: 33.6 G/DL (ref 31.4–37.4)
MCHC RBC AUTO-ENTMCNC: 33.6 G/DL (ref 31.4–37.4)
MCHC RBC AUTO-ENTMCNC: 33.8 G/DL (ref 31.4–37.4)
MCHC RBC AUTO-ENTMCNC: 34.4 G/DL (ref 31.4–37.4)
MCHC RBC AUTO-ENTMCNC: 34.4 G/DL (ref 31.4–37.4)
MCHC RBC AUTO-ENTMCNC: 34.7 G/DL (ref 31.4–37.4)
MCHC RBC AUTO-ENTMCNC: 34.7 G/DL (ref 31.4–37.4)
MCV RBC AUTO: 89 FL (ref 82–98)
MCV RBC AUTO: 90 FL (ref 82–98)
MCV RBC AUTO: 90 FL (ref 82–98)
MCV RBC AUTO: 91 FL (ref 82–98)
MCV RBC AUTO: 92 FL (ref 82–98)
MCV RBC AUTO: 92 FL (ref 82–98)
MCV RBC AUTO: 93 FL (ref 82–98)
MCV RBC AUTO: 94 FL (ref 82–98)
MCV RBC AUTO: 94 FL (ref 82–98)
MONOCYTES # BLD AUTO: 0.16 THOUSAND/UL (ref 0–1.22)
MONOCYTES # BLD AUTO: 0.22 THOUSAND/ΜL (ref 0.17–1.22)
MONOCYTES # BLD AUTO: 0.43 THOUSAND/ΜL (ref 0.17–1.22)
MONOCYTES # BLD AUTO: 0.63 THOUSAND/ΜL (ref 0.17–1.22)
MONOCYTES # BLD AUTO: 0.63 THOUSAND/ΜL (ref 0.17–1.22)
MONOCYTES # BLD AUTO: 0.94 THOUSAND/ΜL (ref 0.17–1.22)
MONOCYTES # BLD AUTO: 1.01 THOUSAND/ΜL (ref 0.17–1.22)
MONOCYTES NFR BLD AUTO: 10 % (ref 4–12)
MONOCYTES NFR BLD AUTO: 14 % (ref 4–12)
MONOCYTES NFR BLD AUTO: 5 % (ref 4–12)
MONOCYTES NFR BLD AUTO: 6 % (ref 4–12)
MONOCYTES NFR BLD AUTO: 7 % (ref 4–12)
MONOCYTES NFR BLD AUTO: 9 % (ref 4–12)
MONOCYTES NFR BLD: 2 % (ref 4–12)
NEUTROPHILS # BLD AUTO: 4.09 THOUSANDS/ΜL (ref 1.85–7.62)
NEUTROPHILS # BLD AUTO: 4.99 THOUSANDS/ΜL (ref 1.85–7.62)
NEUTROPHILS # BLD AUTO: 5.35 THOUSANDS/ΜL (ref 1.85–7.62)
NEUTROPHILS # BLD AUTO: 6.41 THOUSANDS/ΜL (ref 1.85–7.62)
NEUTROPHILS # BLD AUTO: 8.11 THOUSANDS/ΜL (ref 1.85–7.62)
NEUTROPHILS # BLD AUTO: 8.59 THOUSANDS/ΜL (ref 1.85–7.62)
NEUTROPHILS # BLD MANUAL: 7.5 THOUSAND/UL (ref 1.85–7.62)
NEUTS BAND NFR BLD MANUAL: 1 % (ref 0–8)
NEUTS SEG NFR BLD AUTO: 74 % (ref 43–75)
NEUTS SEG NFR BLD AUTO: 75 % (ref 43–75)
NEUTS SEG NFR BLD AUTO: 83 % (ref 43–75)
NEUTS SEG NFR BLD AUTO: 85 % (ref 43–75)
NEUTS SEG NFR BLD AUTO: 85 % (ref 43–75)
NEUTS SEG NFR BLD AUTO: 87 % (ref 43–75)
NEUTS SEG NFR BLD AUTO: 90 % (ref 43–75)
NITRITE UR QL STRIP: NEGATIVE
NON-SQ EPI CELLS URNS QL MICRO: ABNORMAL /HPF
NRBC BLD AUTO-RTO: 0 /100 WBCS
O2 CT BLDA-SCNC: 18.2 ML/DL (ref 16–23)
OXYHGB MFR BLDA: 97.3 % (ref 94–97)
P AXIS: 73 DEGREES
P AXIS: 75 DEGREES
P AXIS: 90 DEGREES
PCO2 BLDA: 32.5 MM HG (ref 36–44)
PEEP RESPIRATORY: 5 CM[H2O]
PH BLDA: 7.41 [PH] (ref 7.35–7.45)
PH UR STRIP.AUTO: 5.5 [PH] (ref 4.5–8)
PHOSPHATE SERPL-MCNC: 2.3 MG/DL (ref 2.3–4.1)
PHOSPHATE SERPL-MCNC: 2.7 MG/DL (ref 2.3–4.1)
PHOSPHATE SERPL-MCNC: 2.8 MG/DL (ref 2.3–4.1)
PHOSPHATE SERPL-MCNC: 4.4 MG/DL (ref 2.3–4.1)
PLATELET # BLD AUTO: 133 THOUSANDS/UL (ref 149–390)
PLATELET # BLD AUTO: 142 THOUSANDS/UL (ref 149–390)
PLATELET # BLD AUTO: 144 THOUSANDS/UL (ref 149–390)
PLATELET # BLD AUTO: 156 THOUSANDS/UL (ref 149–390)
PLATELET # BLD AUTO: 160 THOUSANDS/UL (ref 149–390)
PLATELET # BLD AUTO: 164 THOUSANDS/UL (ref 149–390)
PLATELET # BLD AUTO: 202 THOUSANDS/UL (ref 149–390)
PLATELET # BLD AUTO: 210 THOUSANDS/UL (ref 149–390)
PLATELET # BLD AUTO: 218 THOUSANDS/UL (ref 149–390)
PLATELET # BLD AUTO: 247 THOUSANDS/UL (ref 149–390)
PLATELET BLD QL SMEAR: ADEQUATE
PMV BLD AUTO: 10 FL (ref 8.9–12.7)
PMV BLD AUTO: 8.8 FL (ref 8.9–12.7)
PMV BLD AUTO: 8.9 FL (ref 8.9–12.7)
PMV BLD AUTO: 9.4 FL (ref 8.9–12.7)
PMV BLD AUTO: 9.4 FL (ref 8.9–12.7)
PMV BLD AUTO: 9.6 FL (ref 8.9–12.7)
PMV BLD AUTO: 9.6 FL (ref 8.9–12.7)
PMV BLD AUTO: 9.7 FL (ref 8.9–12.7)
PMV BLD AUTO: 9.8 FL (ref 8.9–12.7)
PMV BLD AUTO: 9.9 FL (ref 8.9–12.7)
PO2 BLDA: 103.1 MM HG (ref 75–129)
POTASSIUM SERPL-SCNC: 3.6 MMOL/L (ref 3.5–5.3)
POTASSIUM SERPL-SCNC: 3.6 MMOL/L (ref 3.5–5.3)
POTASSIUM SERPL-SCNC: 3.8 MMOL/L (ref 3.5–5.3)
POTASSIUM SERPL-SCNC: 3.8 MMOL/L (ref 3.5–5.3)
POTASSIUM SERPL-SCNC: 4.1 MMOL/L (ref 3.5–5.3)
POTASSIUM SERPL-SCNC: 4.2 MMOL/L (ref 3.5–5.3)
POTASSIUM SERPL-SCNC: 4.2 MMOL/L (ref 3.5–5.3)
PR INTERVAL: 133 MS
PR INTERVAL: 152 MS
PR INTERVAL: 158 MS
PROCALCITONIN SERPL-MCNC: 0.49 NG/ML
PROT SERPL-MCNC: 6.6 G/DL (ref 6.4–8.2)
PROT SERPL-MCNC: 8 G/DL (ref 6.4–8.2)
PROT UR STRIP-MCNC: ABNORMAL MG/DL
QRS AXIS: -40 DEGREES
QRS AXIS: -48 DEGREES
QRS AXIS: -70 DEGREES
QRSD INTERVAL: 83 MS
QRSD INTERVAL: 86 MS
QRSD INTERVAL: 88 MS
QT INTERVAL: 358 MS
QT INTERVAL: 358 MS
QT INTERVAL: 390 MS
QTC INTERVAL: 416 MS
QTC INTERVAL: 419 MS
QTC INTERVAL: 455 MS
RBC # BLD AUTO: 3.19 MILLION/UL (ref 3.88–5.62)
RBC # BLD AUTO: 3.3 MILLION/UL (ref 3.88–5.62)
RBC # BLD AUTO: 3.37 MILLION/UL (ref 3.88–5.62)
RBC # BLD AUTO: 3.4 MILLION/UL (ref 3.88–5.62)
RBC # BLD AUTO: 3.41 MILLION/UL (ref 3.88–5.62)
RBC # BLD AUTO: 3.52 MILLION/UL (ref 3.88–5.62)
RBC # BLD AUTO: 3.84 MILLION/UL (ref 3.88–5.62)
RBC # BLD AUTO: 4.12 MILLION/UL (ref 3.88–5.62)
RBC # BLD AUTO: 4.32 MILLION/UL (ref 3.88–5.62)
RBC #/AREA URNS AUTO: ABNORMAL /HPF
RBC MORPH BLD: NORMAL
SODIUM SERPL-SCNC: 132 MMOL/L (ref 136–145)
SODIUM SERPL-SCNC: 133 MMOL/L (ref 136–145)
SODIUM SERPL-SCNC: 134 MMOL/L (ref 136–145)
SODIUM SERPL-SCNC: 136 MMOL/L (ref 136–145)
SODIUM SERPL-SCNC: 137 MMOL/L (ref 136–145)
SODIUM SERPL-SCNC: 137 MMOL/L (ref 136–145)
SP GR UR STRIP.AUTO: 1.01 (ref 1–1.03)
SPECIMEN SOURCE: ABNORMAL
T WAVE AXIS: 61 DEGREES
T WAVE AXIS: 63 DEGREES
T WAVE AXIS: 95 DEGREES
T4 FREE SERPL-MCNC: 0.3 NG/DL (ref 0.76–1.46)
TOTAL CELLS COUNTED SPEC: 100
TSH SERPL DL<=0.05 MIU/L-ACNC: 40.9 UIU/ML (ref 0.36–3.74)
TSH SERPL DL<=0.05 MIU/L-ACNC: 99.44 UIU/ML (ref 0.36–3.74)
UROBILINOGEN UR QL STRIP.AUTO: 0.2 E.U./DL
VENT AC: 14
VENT- AC: AC
VENTRICULAR RATE: 81 BPM
VENTRICULAR RATE: 82 BPM
VENTRICULAR RATE: 82 BPM
VT SETTING VENT: 500 ML
WBC # BLD AUTO: 10.33 THOUSAND/UL (ref 4.31–10.16)
WBC # BLD AUTO: 10.68 THOUSAND/UL (ref 4.31–10.16)
WBC # BLD AUTO: 4.66 THOUSAND/UL (ref 4.31–10.16)
WBC # BLD AUTO: 6.29 THOUSAND/UL (ref 4.31–10.16)
WBC # BLD AUTO: 6.69 THOUSAND/UL (ref 4.31–10.16)
WBC # BLD AUTO: 6.96 THOUSAND/UL (ref 4.31–10.16)
WBC # BLD AUTO: 7.45 THOUSAND/UL (ref 4.31–10.16)
WBC # BLD AUTO: 8.24 THOUSAND/UL (ref 4.31–10.16)
WBC # BLD AUTO: 8.37 THOUSAND/UL (ref 4.31–10.16)
WBC #/AREA URNS AUTO: ABNORMAL /HPF

## 2019-01-01 PROCEDURE — 84100 ASSAY OF PHOSPHORUS: CPT | Performed by: EMERGENCY MEDICINE

## 2019-01-01 PROCEDURE — 99233 SBSQ HOSP IP/OBS HIGH 50: CPT | Performed by: INTERNAL MEDICINE

## 2019-01-01 PROCEDURE — 80048 BASIC METABOLIC PNL TOTAL CA: CPT | Performed by: NURSE PRACTITIONER

## 2019-01-01 PROCEDURE — 77412 RADIATION TX DELIVERY LVL 3: CPT | Performed by: RADIOLOGY

## 2019-01-01 PROCEDURE — 71045 X-RAY EXAM CHEST 1 VIEW: CPT

## 2019-01-01 PROCEDURE — 94760 N-INVAS EAR/PLS OXIMETRY 1: CPT

## 2019-01-01 PROCEDURE — 77301 RADIOTHERAPY DOSE PLAN IMRT: CPT | Performed by: RADIOLOGY

## 2019-01-01 PROCEDURE — 93010 ELECTROCARDIOGRAM REPORT: CPT | Performed by: INTERNAL MEDICINE

## 2019-01-01 PROCEDURE — 80048 BASIC METABOLIC PNL TOTAL CA: CPT | Performed by: EMERGENCY MEDICINE

## 2019-01-01 PROCEDURE — 99291 CRITICAL CARE FIRST HOUR: CPT | Performed by: INTERNAL MEDICINE

## 2019-01-01 PROCEDURE — 85025 COMPLETE CBC W/AUTO DIFF WBC: CPT | Performed by: PHYSICIAN ASSISTANT

## 2019-01-01 PROCEDURE — 87147 CULTURE TYPE IMMUNOLOGIC: CPT | Performed by: NURSE PRACTITIONER

## 2019-01-01 PROCEDURE — 80076 HEPATIC FUNCTION PANEL: CPT | Performed by: EMERGENCY MEDICINE

## 2019-01-01 PROCEDURE — 83615 LACTATE (LD) (LDH) ENZYME: CPT

## 2019-01-01 PROCEDURE — 84100 ASSAY OF PHOSPHORUS: CPT | Performed by: PHYSICIAN ASSISTANT

## 2019-01-01 PROCEDURE — C9113 INJ PANTOPRAZOLE SODIUM, VIA: HCPCS | Performed by: EMERGENCY MEDICINE

## 2019-01-01 PROCEDURE — 99253 IP/OBS CNSLTJ NEW/EST LOW 45: CPT | Performed by: INTERNAL MEDICINE

## 2019-01-01 PROCEDURE — 83735 ASSAY OF MAGNESIUM: CPT | Performed by: PHYSICIAN ASSISTANT

## 2019-01-01 PROCEDURE — 85027 COMPLETE CBC AUTOMATED: CPT | Performed by: NURSE PRACTITIONER

## 2019-01-01 PROCEDURE — 82948 REAGENT STRIP/BLOOD GLUCOSE: CPT

## 2019-01-01 PROCEDURE — 77338 DESIGN MLC DEVICE FOR IMRT: CPT | Performed by: RADIOLOGY

## 2019-01-01 PROCEDURE — 80048 BASIC METABOLIC PNL TOTAL CA: CPT | Performed by: PHYSICIAN ASSISTANT

## 2019-01-01 PROCEDURE — 99153 MOD SED SAME PHYS/QHP EA: CPT

## 2019-01-01 PROCEDURE — 0WJ60ZZ INSPECTION OF NECK, OPEN APPROACH: ICD-10-PCS | Performed by: OTOLARYNGOLOGY

## 2019-01-01 PROCEDURE — 99255 IP/OBS CONSLTJ NEW/EST HI 80: CPT | Performed by: INTERNAL MEDICINE

## 2019-01-01 PROCEDURE — 77387 GUIDANCE FOR RADJ TX DLVR: CPT | Performed by: RADIOLOGY

## 2019-01-01 PROCEDURE — 83735 ASSAY OF MAGNESIUM: CPT | Performed by: NURSE PRACTITIONER

## 2019-01-01 PROCEDURE — 81001 URINALYSIS AUTO W/SCOPE: CPT

## 2019-01-01 PROCEDURE — 94003 VENT MGMT INPAT SUBQ DAY: CPT

## 2019-01-01 PROCEDURE — 84443 ASSAY THYROID STIM HORMONE: CPT | Performed by: EMERGENCY MEDICINE

## 2019-01-01 PROCEDURE — 99245 OFF/OP CONSLTJ NEW/EST HI 55: CPT | Performed by: INTERNAL MEDICINE

## 2019-01-01 PROCEDURE — 77334 RADIATION TREATMENT AID(S): CPT | Performed by: RADIOLOGY

## 2019-01-01 PROCEDURE — 80053 COMPREHEN METABOLIC PANEL: CPT | Performed by: PHYSICIAN ASSISTANT

## 2019-01-01 PROCEDURE — 85007 BL SMEAR W/DIFF WBC COUNT: CPT | Performed by: NURSE PRACTITIONER

## 2019-01-01 PROCEDURE — 70491 CT SOFT TISSUE NECK W/DYE: CPT

## 2019-01-01 PROCEDURE — 5A1935Z RESPIRATORY VENTILATION, LESS THAN 24 CONSECUTIVE HOURS: ICD-10-PCS | Performed by: INTERNAL MEDICINE

## 2019-01-01 PROCEDURE — DW011ZZ BEAM RADIATION OF HEAD AND NECK USING PHOTONS 1 - 10 MEV: ICD-10-PCS | Performed by: INTERNAL MEDICINE

## 2019-01-01 PROCEDURE — 82105 ALPHA-FETOPROTEIN SERUM: CPT

## 2019-01-01 PROCEDURE — 84100 ASSAY OF PHOSPHORUS: CPT | Performed by: NURSE PRACTITIONER

## 2019-01-01 PROCEDURE — 77417 THER RADIOLOGY PORT IMAGE(S): CPT | Performed by: RADIOLOGY

## 2019-01-01 PROCEDURE — 0JH63WZ INSERTION OF TOTALLY IMPLANTABLE VASCULAR ACCESS DEVICE INTO CHEST SUBCUTANEOUS TISSUE AND FASCIA, PERCUTANEOUS APPROACH: ICD-10-PCS | Performed by: RADIOLOGY

## 2019-01-01 PROCEDURE — 78815 PET IMAGE W/CT SKULL-THIGH: CPT

## 2019-01-01 PROCEDURE — 94002 VENT MGMT INPAT INIT DAY: CPT

## 2019-01-01 PROCEDURE — 96360 HYDRATION IV INFUSION INIT: CPT

## 2019-01-01 PROCEDURE — 36415 COLL VENOUS BLD VENIPUNCTURE: CPT

## 2019-01-01 PROCEDURE — 99232 SBSQ HOSP IP/OBS MODERATE 35: CPT | Performed by: INTERNAL MEDICINE

## 2019-01-01 PROCEDURE — 77300 RADIATION THERAPY DOSE PLAN: CPT | Performed by: RADIOLOGY

## 2019-01-01 PROCEDURE — 36600 WITHDRAWAL OF ARTERIAL BLOOD: CPT

## 2019-01-01 PROCEDURE — 93005 ELECTROCARDIOGRAM TRACING: CPT

## 2019-01-01 PROCEDURE — 83735 ASSAY OF MAGNESIUM: CPT | Performed by: EMERGENCY MEDICINE

## 2019-01-01 PROCEDURE — 85025 COMPLETE CBC W/AUTO DIFF WBC: CPT | Performed by: EMERGENCY MEDICINE

## 2019-01-01 PROCEDURE — 77331 SPECIAL RADIATION DOSIMETRY: CPT | Performed by: RADIOLOGY

## 2019-01-01 PROCEDURE — 99223 1ST HOSP IP/OBS HIGH 75: CPT | Performed by: INTERNAL MEDICINE

## 2019-01-01 PROCEDURE — 84145 PROCALCITONIN (PCT): CPT | Performed by: PHYSICIAN ASSISTANT

## 2019-01-01 PROCEDURE — 0BH18EZ INSERTION OF ENDOTRACHEAL AIRWAY INTO TRACHEA, VIA NATURAL OR ARTIFICIAL OPENING ENDOSCOPIC: ICD-10-PCS | Performed by: INTERNAL MEDICINE

## 2019-01-01 PROCEDURE — 87070 CULTURE OTHR SPECIMN AEROBIC: CPT | Performed by: NURSE PRACTITIONER

## 2019-01-01 PROCEDURE — 77001 FLUOROGUIDE FOR VEIN DEVICE: CPT | Performed by: RADIOLOGY

## 2019-01-01 PROCEDURE — 99285 EMERGENCY DEPT VISIT HI MDM: CPT

## 2019-01-01 PROCEDURE — 99238 HOSP IP/OBS DSCHRG MGMT 30/<: CPT | Performed by: INTERNAL MEDICINE

## 2019-01-01 PROCEDURE — 99152 MOD SED SAME PHYS/QHP 5/>YRS: CPT

## 2019-01-01 PROCEDURE — 71250 CT THORAX DX C-: CPT

## 2019-01-01 PROCEDURE — 77280 THER RAD SIMULAJ FIELD SMPL: CPT | Performed by: RADIOLOGY

## 2019-01-01 PROCEDURE — 84439 ASSAY OF FREE THYROXINE: CPT | Performed by: EMERGENCY MEDICINE

## 2019-01-01 PROCEDURE — 77470 SPECIAL RADIATION TREATMENT: CPT | Performed by: RADIOLOGY

## 2019-01-01 PROCEDURE — 99356 PR PROLONGED SVC I/P OR OBS SETTING 1ST HOUR: CPT | Performed by: FAMILY MEDICINE

## 2019-01-01 PROCEDURE — 36415 COLL VENOUS BLD VENIPUNCTURE: CPT | Performed by: EMERGENCY MEDICINE

## 2019-01-01 PROCEDURE — 99232 SBSQ HOSP IP/OBS MODERATE 35: CPT | Performed by: FAMILY MEDICINE

## 2019-01-01 PROCEDURE — A9560 TC99M LABELED RBC: HCPCS

## 2019-01-01 PROCEDURE — 82805 BLOOD GASES W/O2 SATURATION: CPT | Performed by: NURSE PRACTITIONER

## 2019-01-01 PROCEDURE — 70490 CT SOFT TISSUE NECK W/O DYE: CPT

## 2019-01-01 PROCEDURE — 99152 MOD SED SAME PHYS/QHP 5/>YRS: CPT | Performed by: RADIOLOGY

## 2019-01-01 PROCEDURE — C9113 INJ PANTOPRAZOLE SODIUM, VIA: HCPCS | Performed by: NURSE PRACTITIONER

## 2019-01-01 PROCEDURE — 5A1955Z RESPIRATORY VENTILATION, GREATER THAN 96 CONSECUTIVE HOURS: ICD-10-PCS | Performed by: INTERNAL MEDICINE

## 2019-01-01 PROCEDURE — 99291 CRITICAL CARE FIRST HOUR: CPT | Performed by: EMERGENCY MEDICINE

## 2019-01-01 PROCEDURE — 36561 INSERT TUNNELED CV CATH: CPT | Performed by: RADIOLOGY

## 2019-01-01 PROCEDURE — 85049 AUTOMATED PLATELET COUNT: CPT | Performed by: EMERGENCY MEDICINE

## 2019-01-01 PROCEDURE — 96361 HYDRATE IV INFUSION ADD-ON: CPT

## 2019-01-01 PROCEDURE — 84702 CHORIONIC GONADOTROPIN TEST: CPT

## 2019-01-01 PROCEDURE — C1788 PORT, INDWELLING, IMP: HCPCS

## 2019-01-01 PROCEDURE — 78472 GATED HEART PLANAR SINGLE: CPT

## 2019-01-01 PROCEDURE — 76937 US GUIDE VASCULAR ACCESS: CPT | Performed by: RADIOLOGY

## 2019-01-01 PROCEDURE — A9552 F18 FDG: HCPCS

## 2019-01-01 RX ORDER — INSULIN GLARGINE 100 [IU]/ML
30 INJECTION, SOLUTION SUBCUTANEOUS EVERY 12 HOURS SCHEDULED
Status: DISCONTINUED | OUTPATIENT
Start: 2019-01-01 | End: 2019-01-01

## 2019-01-01 RX ORDER — POLYETHYLENE GLYCOL 3350 17 G/17G
17 POWDER, FOR SOLUTION ORAL DAILY
Status: DISCONTINUED | OUTPATIENT
Start: 2019-01-01 | End: 2019-01-01

## 2019-01-01 RX ORDER — INSULIN GLARGINE 100 [IU]/ML
25 INJECTION, SOLUTION SUBCUTANEOUS EVERY MORNING
Status: DISCONTINUED | OUTPATIENT
Start: 2019-01-01 | End: 2019-01-01

## 2019-01-01 RX ORDER — LISINOPRIL 20 MG/1
20 TABLET ORAL DAILY
Status: DISCONTINUED | OUTPATIENT
Start: 2019-01-01 | End: 2019-01-01

## 2019-01-01 RX ORDER — OXYCODONE HCL 5 MG/5 ML
5 SOLUTION, ORAL ORAL EVERY 4 HOURS PRN
Status: DISCONTINUED | OUTPATIENT
Start: 2019-01-01 | End: 2019-01-01

## 2019-01-01 RX ORDER — ONDANSETRON 4 MG/1
4 TABLET, ORALLY DISINTEGRATING ORAL EVERY 6 HOURS PRN
Qty: 30 TABLET | Refills: 1 | Status: SHIPPED | OUTPATIENT
Start: 2019-01-01 | End: 2019-01-01 | Stop reason: HOSPADM

## 2019-01-01 RX ORDER — HYDRALAZINE HYDROCHLORIDE 20 MG/ML
10 INJECTION INTRAMUSCULAR; INTRAVENOUS EVERY 6 HOURS PRN
Status: DISCONTINUED | OUTPATIENT
Start: 2019-01-01 | End: 2019-01-01

## 2019-01-01 RX ORDER — SODIUM CHLORIDE, SODIUM GLUCONATE, SODIUM ACETATE, POTASSIUM CHLORIDE, MAGNESIUM CHLORIDE, SODIUM PHOSPHATE, DIBASIC, AND POTASSIUM PHOSPHATE .53; .5; .37; .037; .03; .012; .00082 G/100ML; G/100ML; G/100ML; G/100ML; G/100ML; G/100ML; G/100ML
50 INJECTION, SOLUTION INTRAVENOUS CONTINUOUS
Status: DISCONTINUED | OUTPATIENT
Start: 2019-01-01 | End: 2019-01-01

## 2019-01-01 RX ORDER — GINSENG 100 MG
CAPSULE ORAL AS NEEDED
Status: DISCONTINUED | OUTPATIENT
Start: 2019-01-01 | End: 2019-01-01 | Stop reason: HOSPADM

## 2019-01-01 RX ORDER — ONDANSETRON 2 MG/ML
4 INJECTION INTRAMUSCULAR; INTRAVENOUS EVERY 6 HOURS PRN
Status: DISCONTINUED | OUTPATIENT
Start: 2019-01-01 | End: 2019-01-01 | Stop reason: HOSPADM

## 2019-01-01 RX ORDER — LORAZEPAM 2 MG/ML
0.5 INJECTION INTRAMUSCULAR ONCE
Status: COMPLETED | OUTPATIENT
Start: 2019-01-01 | End: 2019-01-01

## 2019-01-01 RX ORDER — METOPROLOL TARTRATE 100 MG/1
100 TABLET ORAL EVERY 12 HOURS SCHEDULED
Status: CANCELLED | OUTPATIENT
Start: 2019-01-01

## 2019-01-01 RX ORDER — CHOLECALCIFEROL (VITAMIN D3) 125 MCG
500 CAPSULE ORAL DAILY
Status: CANCELLED | OUTPATIENT
Start: 2019-01-01

## 2019-01-01 RX ORDER — SODIUM CHLORIDE 9 MG/ML
125 INJECTION, SOLUTION INTRAVENOUS CONTINUOUS
Status: DISCONTINUED | OUTPATIENT
Start: 2019-01-01 | End: 2019-01-01 | Stop reason: HOSPADM

## 2019-01-01 RX ORDER — ACETAMINOPHEN 325 MG/1
975 TABLET ORAL EVERY 8 HOURS PRN
Status: DISCONTINUED | OUTPATIENT
Start: 2019-01-01 | End: 2019-01-01

## 2019-01-01 RX ORDER — DEXTROSE MONOHYDRATE 25 G/50ML
INJECTION, SOLUTION INTRAVENOUS
Status: DISPENSED
Start: 2019-01-01 | End: 2019-01-01

## 2019-01-01 RX ORDER — MIDAZOLAM HYDROCHLORIDE 1 MG/ML
10 INJECTION INTRAMUSCULAR; INTRAVENOUS ONCE
Status: COMPLETED | OUTPATIENT
Start: 2019-01-01 | End: 2019-01-01

## 2019-01-01 RX ORDER — AMLODIPINE BESYLATE 5 MG/1
5 TABLET ORAL DAILY
Status: DISCONTINUED | OUTPATIENT
Start: 2019-01-01 | End: 2019-01-01

## 2019-01-01 RX ORDER — ONDANSETRON 2 MG/ML
INJECTION INTRAMUSCULAR; INTRAVENOUS AS NEEDED
Status: DISCONTINUED | OUTPATIENT
Start: 2019-01-01 | End: 2019-01-01 | Stop reason: SURG

## 2019-01-01 RX ORDER — HALOPERIDOL 5 MG/ML
1 INJECTION INTRAMUSCULAR ONCE
Status: DISCONTINUED | OUTPATIENT
Start: 2019-01-01 | End: 2019-01-01

## 2019-01-01 RX ORDER — QUETIAPINE FUMARATE 25 MG/1
25 TABLET, FILM COATED ORAL 2 TIMES DAILY
Status: DISCONTINUED | OUTPATIENT
Start: 2019-01-01 | End: 2019-01-01

## 2019-01-01 RX ORDER — FENTANYL CITRATE 50 UG/ML
50 INJECTION, SOLUTION INTRAMUSCULAR; INTRAVENOUS
Status: DISCONTINUED | OUTPATIENT
Start: 2019-01-01 | End: 2019-01-01

## 2019-01-01 RX ORDER — ATROPINE SULFATE 10 MG/ML
2 SOLUTION/ DROPS OPHTHALMIC 4 TIMES DAILY
Status: DISCONTINUED | OUTPATIENT
Start: 2019-01-01 | End: 2019-01-01 | Stop reason: HOSPADM

## 2019-01-01 RX ORDER — METOPROLOL TARTRATE 100 MG/1
100 TABLET ORAL EVERY 12 HOURS SCHEDULED
Status: DISCONTINUED | OUTPATIENT
Start: 2019-01-01 | End: 2019-01-01 | Stop reason: HOSPADM

## 2019-01-01 RX ORDER — HALOPERIDOL 5 MG/ML
1 INJECTION INTRAMUSCULAR ONCE
Status: COMPLETED | OUTPATIENT
Start: 2019-01-01 | End: 2019-01-01

## 2019-01-01 RX ORDER — METHYLPREDNISOLONE SODIUM SUCCINATE 40 MG/ML
40 INJECTION, POWDER, LYOPHILIZED, FOR SOLUTION INTRAMUSCULAR; INTRAVENOUS EVERY 8 HOURS
Status: CANCELLED | OUTPATIENT
Start: 2019-01-01

## 2019-01-01 RX ORDER — FENTANYL CITRATE 50 UG/ML
INJECTION, SOLUTION INTRAMUSCULAR; INTRAVENOUS CODE/TRAUMA/SEDATION MEDICATION
Status: COMPLETED | OUTPATIENT
Start: 2019-01-01 | End: 2019-01-01

## 2019-01-01 RX ORDER — GLYCOPYRROLATE 0.2 MG/ML
0.2 INJECTION INTRAMUSCULAR; INTRAVENOUS EVERY 6 HOURS SCHEDULED
Status: DISCONTINUED | OUTPATIENT
Start: 2019-01-01 | End: 2019-01-01 | Stop reason: HOSPADM

## 2019-01-01 RX ORDER — DEXTROSE MONOHYDRATE 25 G/50ML
25 INJECTION, SOLUTION INTRAVENOUS ONCE
Status: COMPLETED | OUTPATIENT
Start: 2019-01-01 | End: 2019-01-01

## 2019-01-01 RX ORDER — AMOXICILLIN 250 MG
2 CAPSULE ORAL 2 TIMES DAILY
Status: DISCONTINUED | OUTPATIENT
Start: 2019-01-01 | End: 2019-01-01

## 2019-01-01 RX ORDER — CHLORHEXIDINE GLUCONATE 0.12 MG/ML
15 RINSE ORAL EVERY 12 HOURS SCHEDULED
Status: CANCELLED | OUTPATIENT
Start: 2019-01-01

## 2019-01-01 RX ORDER — METHYLPREDNISOLONE SODIUM SUCCINATE 40 MG/ML
40 INJECTION, POWDER, LYOPHILIZED, FOR SOLUTION INTRAMUSCULAR; INTRAVENOUS EVERY 8 HOURS
Status: DISCONTINUED | OUTPATIENT
Start: 2019-01-01 | End: 2019-01-01 | Stop reason: HOSPADM

## 2019-01-01 RX ORDER — MIDAZOLAM HYDROCHLORIDE 1 MG/ML
INJECTION INTRAMUSCULAR; INTRAVENOUS CODE/TRAUMA/SEDATION MEDICATION
Status: COMPLETED | OUTPATIENT
Start: 2019-01-01 | End: 2019-01-01

## 2019-01-01 RX ORDER — LIDOCAINE HYDROCHLORIDE AND EPINEPHRINE 10; 10 MG/ML; UG/ML
INJECTION, SOLUTION INFILTRATION; PERINEURAL AS NEEDED
Status: DISCONTINUED | OUTPATIENT
Start: 2019-01-01 | End: 2019-01-01 | Stop reason: HOSPADM

## 2019-01-01 RX ORDER — AMLODIPINE BESYLATE 5 MG/1
5 TABLET ORAL DAILY
Status: DISCONTINUED | OUTPATIENT
Start: 2019-01-01 | End: 2019-01-01 | Stop reason: HOSPADM

## 2019-01-01 RX ORDER — PANTOPRAZOLE SODIUM 40 MG/1
40 INJECTION, POWDER, FOR SOLUTION INTRAVENOUS
Status: DISCONTINUED | OUTPATIENT
Start: 2019-01-01 | End: 2019-01-01 | Stop reason: HOSPADM

## 2019-01-01 RX ORDER — MAGNESIUM HYDROXIDE 1200 MG/15ML
LIQUID ORAL AS NEEDED
Status: DISCONTINUED | OUTPATIENT
Start: 2019-01-01 | End: 2019-01-01 | Stop reason: HOSPADM

## 2019-01-01 RX ORDER — AMLODIPINE BESYLATE 5 MG/1
5 TABLET ORAL DAILY
Status: CANCELLED | OUTPATIENT
Start: 2019-01-01

## 2019-01-01 RX ORDER — HEPARIN SODIUM 5000 [USP'U]/ML
5000 INJECTION, SOLUTION INTRAVENOUS; SUBCUTANEOUS EVERY 8 HOURS SCHEDULED
Status: DISCONTINUED | OUTPATIENT
Start: 2019-01-01 | End: 2019-01-01

## 2019-01-01 RX ORDER — CHOLECALCIFEROL (VITAMIN D3) 125 MCG
500 CAPSULE ORAL DAILY
Status: DISCONTINUED | OUTPATIENT
Start: 2019-01-01 | End: 2019-01-01 | Stop reason: HOSPADM

## 2019-01-01 RX ORDER — CLONIDINE 0.3 MG/24H
1 PATCH, EXTENDED RELEASE TRANSDERMAL WEEKLY
Status: DISCONTINUED | OUTPATIENT
Start: 2019-01-01 | End: 2019-01-01 | Stop reason: HOSPADM

## 2019-01-01 RX ORDER — POTASSIUM CHLORIDE 20MEQ/15ML
20 LIQUID (ML) ORAL ONCE
Status: COMPLETED | OUTPATIENT
Start: 2019-01-01 | End: 2019-01-01

## 2019-01-01 RX ORDER — DOXORUBICIN HYDROCHLORIDE 2 MG/ML
20 INJECTION, SOLUTION INTRAVENOUS ONCE
Status: DISCONTINUED | OUTPATIENT
Start: 2019-01-28 | End: 2019-01-01

## 2019-01-01 RX ORDER — MORPHINE SULFATE 10 MG/ML
10 INJECTION, SOLUTION INTRAMUSCULAR; INTRAVENOUS ONCE AS NEEDED
Status: COMPLETED | OUTPATIENT
Start: 2019-01-01 | End: 2019-01-01

## 2019-01-01 RX ORDER — PROPOFOL 10 MG/ML
5-50 INJECTION, EMULSION INTRAVENOUS
Status: DISCONTINUED | OUTPATIENT
Start: 2019-01-01 | End: 2019-01-01 | Stop reason: HOSPADM

## 2019-01-01 RX ORDER — DEXAMETHASONE SODIUM PHOSPHATE 4 MG/ML
10 INJECTION, SOLUTION INTRA-ARTICULAR; INTRALESIONAL; INTRAMUSCULAR; INTRAVENOUS; SOFT TISSUE EVERY 8 HOURS SCHEDULED
Status: DISCONTINUED | OUTPATIENT
Start: 2019-01-01 | End: 2019-01-01

## 2019-01-01 RX ORDER — DOXORUBICIN HYDROCHLORIDE 2 MG/ML
20 INJECTION, SOLUTION INTRAVENOUS ONCE
Status: DISCONTINUED | OUTPATIENT
Start: 2019-02-04 | End: 2019-01-01

## 2019-01-01 RX ORDER — HYDRALAZINE HYDROCHLORIDE 20 MG/ML
INJECTION INTRAMUSCULAR; INTRAVENOUS CODE/TRAUMA/SEDATION MEDICATION
Status: COMPLETED | OUTPATIENT
Start: 2019-01-01 | End: 2019-01-01

## 2019-01-01 RX ORDER — HEPARIN SODIUM 5000 [USP'U]/ML
5000 INJECTION, SOLUTION INTRAVENOUS; SUBCUTANEOUS EVERY 8 HOURS SCHEDULED
Status: CANCELLED | OUTPATIENT
Start: 2019-01-01

## 2019-01-01 RX ORDER — LIDOCAINE HYDROCHLORIDE 10 MG/ML
INJECTION, SOLUTION INFILTRATION; PERINEURAL AS NEEDED
Status: DISCONTINUED | OUTPATIENT
Start: 2019-01-01 | End: 2019-01-01 | Stop reason: SURG

## 2019-01-01 RX ORDER — LORAZEPAM 2 MG/ML
1 INJECTION INTRAMUSCULAR
Status: DISCONTINUED | OUTPATIENT
Start: 2019-01-01 | End: 2019-01-01 | Stop reason: HOSPADM

## 2019-01-01 RX ORDER — MORPHINE SULFATE 4 MG/ML
4 INJECTION, SOLUTION INTRAMUSCULAR; INTRAVENOUS
Status: DISCONTINUED | OUTPATIENT
Start: 2019-01-01 | End: 2019-01-01 | Stop reason: HOSPADM

## 2019-01-01 RX ORDER — CLONIDINE 0.3 MG/24H
1 PATCH, EXTENDED RELEASE TRANSDERMAL WEEKLY
Status: CANCELLED | OUTPATIENT
Start: 2019-01-01

## 2019-01-01 RX ORDER — METOPROLOL TARTRATE 50 MG/1
50 TABLET, FILM COATED ORAL EVERY 12 HOURS SCHEDULED
Status: DISCONTINUED | OUTPATIENT
Start: 2019-01-01 | End: 2019-01-01

## 2019-01-01 RX ORDER — AMOXICILLIN 250 MG
1 CAPSULE ORAL
Status: DISCONTINUED | OUTPATIENT
Start: 2019-01-01 | End: 2019-01-01

## 2019-01-01 RX ORDER — LISINOPRIL 20 MG/1
20 TABLET ORAL DAILY
Status: CANCELLED | OUTPATIENT
Start: 2019-01-01

## 2019-01-01 RX ORDER — FUROSEMIDE 10 MG/ML
20 INJECTION INTRAMUSCULAR; INTRAVENOUS ONCE
Status: COMPLETED | OUTPATIENT
Start: 2019-01-01 | End: 2019-01-01

## 2019-01-01 RX ORDER — PROPOFOL 10 MG/ML
5-50 INJECTION, EMULSION INTRAVENOUS
Status: DISCONTINUED | OUTPATIENT
Start: 2019-01-01 | End: 2019-01-01

## 2019-01-01 RX ORDER — ACETAMINOPHEN 160 MG/5ML
650 SUSPENSION, ORAL (FINAL DOSE FORM) ORAL EVERY 4 HOURS PRN
Status: CANCELLED | OUTPATIENT
Start: 2019-01-01

## 2019-01-01 RX ORDER — ROCURONIUM BROMIDE 10 MG/ML
INJECTION, SOLUTION INTRAVENOUS AS NEEDED
Status: DISCONTINUED | OUTPATIENT
Start: 2019-01-01 | End: 2019-01-01 | Stop reason: SURG

## 2019-01-01 RX ORDER — CHLORHEXIDINE GLUCONATE 0.12 MG/ML
15 RINSE ORAL EVERY 12 HOURS SCHEDULED
Status: DISCONTINUED | OUTPATIENT
Start: 2019-01-01 | End: 2019-01-01 | Stop reason: HOSPADM

## 2019-01-01 RX ORDER — INSULIN GLARGINE 100 [IU]/ML
50 INJECTION, SOLUTION SUBCUTANEOUS EVERY 12 HOURS SCHEDULED
Status: DISCONTINUED | OUTPATIENT
Start: 2019-01-01 | End: 2019-01-01

## 2019-01-01 RX ORDER — SODIUM CHLORIDE 9 MG/ML
125 INJECTION, SOLUTION INTRAVENOUS CONTINUOUS
Status: CANCELLED | OUTPATIENT
Start: 2019-01-01

## 2019-01-01 RX ORDER — HYDRALAZINE HYDROCHLORIDE 20 MG/ML
10 INJECTION INTRAMUSCULAR; INTRAVENOUS ONCE
Status: COMPLETED | OUTPATIENT
Start: 2019-01-01 | End: 2019-01-01

## 2019-01-01 RX ORDER — LISINOPRIL 10 MG/1
10 TABLET ORAL DAILY
Status: DISCONTINUED | OUTPATIENT
Start: 2019-01-01 | End: 2019-01-01

## 2019-01-01 RX ORDER — DOXORUBICIN HYDROCHLORIDE 2 MG/ML
20 INJECTION, SOLUTION INTRAVENOUS ONCE
Status: COMPLETED | OUTPATIENT
Start: 2019-01-01 | End: 2019-01-01

## 2019-01-01 RX ORDER — HEPARIN SODIUM 5000 [USP'U]/ML
5000 INJECTION, SOLUTION INTRAVENOUS; SUBCUTANEOUS EVERY 8 HOURS SCHEDULED
Status: DISCONTINUED | OUTPATIENT
Start: 2019-01-01 | End: 2019-01-01 | Stop reason: HOSPADM

## 2019-01-01 RX ORDER — ONDANSETRON 2 MG/ML
4 INJECTION INTRAMUSCULAR; INTRAVENOUS ONCE AS NEEDED
Status: DISCONTINUED | OUTPATIENT
Start: 2019-01-01 | End: 2019-01-01 | Stop reason: HOSPADM

## 2019-01-01 RX ORDER — ONDANSETRON 2 MG/ML
4 INJECTION INTRAMUSCULAR; INTRAVENOUS EVERY 8 HOURS PRN
Status: CANCELLED | OUTPATIENT
Start: 2019-01-01

## 2019-01-01 RX ORDER — SCOLOPAMINE TRANSDERMAL SYSTEM 1 MG/1
1 PATCH, EXTENDED RELEASE TRANSDERMAL
Status: DISCONTINUED | OUTPATIENT
Start: 2019-01-01 | End: 2019-01-01 | Stop reason: HOSPADM

## 2019-01-01 RX ORDER — METOPROLOL TARTRATE 50 MG/1
100 TABLET, FILM COATED ORAL EVERY 12 HOURS SCHEDULED
Status: DISCONTINUED | OUTPATIENT
Start: 2019-01-01 | End: 2019-01-01

## 2019-01-01 RX ORDER — SODIUM CHLORIDE 9 MG/ML
80 INJECTION, SOLUTION INTRAVENOUS CONTINUOUS
Status: DISCONTINUED | OUTPATIENT
Start: 2019-01-01 | End: 2019-01-01 | Stop reason: HOSPADM

## 2019-01-01 RX ORDER — CHLORHEXIDINE GLUCONATE 0.12 MG/ML
15 RINSE ORAL EVERY 12 HOURS SCHEDULED
Status: DISCONTINUED | OUTPATIENT
Start: 2019-01-01 | End: 2019-01-01

## 2019-01-01 RX ORDER — LISINOPRIL 20 MG/1
20 TABLET ORAL DAILY
Status: DISCONTINUED | OUTPATIENT
Start: 2019-01-01 | End: 2019-01-01 | Stop reason: HOSPADM

## 2019-01-01 RX ORDER — ACETAMINOPHEN 160 MG/5ML
650 SUSPENSION, ORAL (FINAL DOSE FORM) ORAL EVERY 4 HOURS PRN
Status: DISCONTINUED | OUTPATIENT
Start: 2019-01-01 | End: 2019-01-01 | Stop reason: HOSPADM

## 2019-01-01 RX ORDER — PROPOFOL 10 MG/ML
5-50 INJECTION, EMULSION INTRAVENOUS
Status: CANCELLED | OUTPATIENT
Start: 2019-01-01

## 2019-01-01 RX ORDER — QUETIAPINE FUMARATE 25 MG/1
25 TABLET, FILM COATED ORAL
Status: DISCONTINUED | OUTPATIENT
Start: 2019-01-01 | End: 2019-01-01

## 2019-01-01 RX ORDER — DEXAMETHASONE SODIUM PHOSPHATE 4 MG/ML
10 INJECTION, SOLUTION INTRA-ARTICULAR; INTRALESIONAL; INTRAMUSCULAR; INTRAVENOUS; SOFT TISSUE ONCE
Status: COMPLETED | OUTPATIENT
Start: 2019-01-01 | End: 2019-01-01

## 2019-01-01 RX ORDER — FENTANYL CITRATE 50 UG/ML
INJECTION, SOLUTION INTRAMUSCULAR; INTRAVENOUS AS NEEDED
Status: DISCONTINUED | OUTPATIENT
Start: 2019-01-01 | End: 2019-01-01

## 2019-01-01 RX ORDER — SUCCINYLCHOLINE CHLORIDE 20 MG/ML
INJECTION INTRAMUSCULAR; INTRAVENOUS AS NEEDED
Status: DISCONTINUED | OUTPATIENT
Start: 2019-01-01 | End: 2019-01-01 | Stop reason: SURG

## 2019-01-01 RX ORDER — HYDRALAZINE HYDROCHLORIDE 20 MG/ML
INJECTION INTRAMUSCULAR; INTRAVENOUS
Status: COMPLETED
Start: 2019-01-01 | End: 2019-01-01

## 2019-01-01 RX ORDER — LORAZEPAM 2 MG/ML
1 INJECTION INTRAMUSCULAR ONCE
Status: COMPLETED | OUTPATIENT
Start: 2019-01-01 | End: 2019-01-01

## 2019-01-01 RX ORDER — BISACODYL 10 MG
10 SUPPOSITORY, RECTAL RECTAL DAILY
Status: DISCONTINUED | OUTPATIENT
Start: 2019-01-01 | End: 2019-01-01

## 2019-01-01 RX ORDER — FENTANYL CITRATE 50 UG/ML
50 INJECTION, SOLUTION INTRAMUSCULAR; INTRAVENOUS
Status: CANCELLED | OUTPATIENT
Start: 2019-01-01

## 2019-01-01 RX ORDER — SODIUM CHLORIDE, SODIUM GLUCONATE, SODIUM ACETATE, POTASSIUM CHLORIDE, MAGNESIUM CHLORIDE, SODIUM PHOSPHATE, DIBASIC, AND POTASSIUM PHOSPHATE .53; .5; .37; .037; .03; .012; .00082 G/100ML; G/100ML; G/100ML; G/100ML; G/100ML; G/100ML; G/100ML
500 INJECTION, SOLUTION INTRAVENOUS ONCE
Status: COMPLETED | OUTPATIENT
Start: 2019-01-01 | End: 2019-01-01

## 2019-01-01 RX ORDER — FENTANYL CITRATE/PF 50 MCG/ML
25 SYRINGE (ML) INJECTION
Status: DISCONTINUED | OUTPATIENT
Start: 2019-01-01 | End: 2019-01-01 | Stop reason: HOSPADM

## 2019-01-01 RX ORDER — SODIUM CHLORIDE 9 MG/ML
80 INJECTION, SOLUTION INTRAVENOUS CONTINUOUS
Status: CANCELLED | OUTPATIENT
Start: 2019-01-01

## 2019-01-01 RX ORDER — POTASSIUM CHLORIDE 20MEQ/15ML
40 LIQUID (ML) ORAL ONCE
Status: COMPLETED | OUTPATIENT
Start: 2019-01-01 | End: 2019-01-01

## 2019-01-01 RX ORDER — PROPOFOL 10 MG/ML
INJECTION, EMULSION INTRAVENOUS AS NEEDED
Status: DISCONTINUED | OUTPATIENT
Start: 2019-01-01 | End: 2019-01-01 | Stop reason: SURG

## 2019-01-01 RX ORDER — INSULIN GLARGINE 100 [IU]/ML
12 INJECTION, SOLUTION SUBCUTANEOUS ONCE
Status: COMPLETED | OUTPATIENT
Start: 2019-01-01 | End: 2019-01-01

## 2019-01-01 RX ORDER — LORAZEPAM 2 MG/ML
INJECTION INTRAMUSCULAR
Status: COMPLETED
Start: 2019-01-01 | End: 2019-01-01

## 2019-01-01 RX ORDER — FENTANYL CITRATE 50 UG/ML
50 INJECTION, SOLUTION INTRAMUSCULAR; INTRAVENOUS
Status: DISCONTINUED | OUTPATIENT
Start: 2019-01-01 | End: 2019-01-01 | Stop reason: HOSPADM

## 2019-01-01 RX ORDER — PANTOPRAZOLE SODIUM 40 MG/1
40 INJECTION, POWDER, FOR SOLUTION INTRAVENOUS
Status: CANCELLED | OUTPATIENT
Start: 2019-01-01

## 2019-01-01 RX ORDER — PANTOPRAZOLE SODIUM 40 MG/1
40 INJECTION, POWDER, FOR SOLUTION INTRAVENOUS
Status: DISCONTINUED | OUTPATIENT
Start: 2019-01-01 | End: 2019-01-01

## 2019-01-01 RX ORDER — PROPOFOL 10 MG/ML
INJECTION, EMULSION INTRAVENOUS CONTINUOUS PRN
Status: DISCONTINUED | OUTPATIENT
Start: 2019-01-01 | End: 2019-01-01 | Stop reason: SURG

## 2019-01-01 RX ORDER — MIDAZOLAM HYDROCHLORIDE 1 MG/ML
INJECTION INTRAMUSCULAR; INTRAVENOUS AS NEEDED
Status: DISCONTINUED | OUTPATIENT
Start: 2019-01-01 | End: 2019-01-01 | Stop reason: SURG

## 2019-01-01 RX ORDER — FENTANYL CITRATE 50 UG/ML
INJECTION, SOLUTION INTRAMUSCULAR; INTRAVENOUS AS NEEDED
Status: DISCONTINUED | OUTPATIENT
Start: 2019-01-01 | End: 2019-01-01 | Stop reason: SURG

## 2019-01-01 RX ADMIN — FENTANYL CITRATE 50 MCG: 50 INJECTION, SOLUTION INTRAMUSCULAR; INTRAVENOUS at 18:11

## 2019-01-01 RX ADMIN — FENTANYL CITRATE 50 MCG: 50 INJECTION, SOLUTION INTRAMUSCULAR; INTRAVENOUS at 10:40

## 2019-01-01 RX ADMIN — FENTANYL CITRATE 50 MCG: 50 INJECTION, SOLUTION INTRAMUSCULAR; INTRAVENOUS at 05:46

## 2019-01-01 RX ADMIN — FENTANYL CITRATE 75 MCG/HR: at 18:11

## 2019-01-01 RX ADMIN — FENTANYL CITRATE 75 MCG/HR: at 20:22

## 2019-01-01 RX ADMIN — HYDRALAZINE HYDROCHLORIDE 10 MG: 20 INJECTION INTRAMUSCULAR; INTRAVENOUS at 10:46

## 2019-01-01 RX ADMIN — AMLODIPINE BESYLATE 5 MG: 5 TABLET ORAL at 11:07

## 2019-01-01 RX ADMIN — DEXMEDETOMIDINE 0.4 MCG/KG/HR: 100 INJECTION, SOLUTION, CONCENTRATE INTRAVENOUS at 19:28

## 2019-01-01 RX ADMIN — CHLORHEXIDINE GLUCONATE 0.12% ORAL RINSE 15 ML: 1.2 LIQUID ORAL at 08:19

## 2019-01-01 RX ADMIN — HEPARIN SODIUM 5000 UNITS: 5000 INJECTION INTRAVENOUS; SUBCUTANEOUS at 13:52

## 2019-01-01 RX ADMIN — DEXMEDETOMIDINE 1.3 MCG/KG/HR: 100 INJECTION, SOLUTION, CONCENTRATE INTRAVENOUS at 01:38

## 2019-01-01 RX ADMIN — Medication 20 MG: at 08:20

## 2019-01-01 RX ADMIN — HEPARIN SODIUM 5000 UNITS: 5000 INJECTION INTRAVENOUS; SUBCUTANEOUS at 05:46

## 2019-01-01 RX ADMIN — ONDANSETRON 4 MG: 2 INJECTION INTRAMUSCULAR; INTRAVENOUS at 11:45

## 2019-01-01 RX ADMIN — DEXMEDETOMIDINE 0.7 MCG/KG/HR: 100 INJECTION, SOLUTION, CONCENTRATE INTRAVENOUS at 08:25

## 2019-01-01 RX ADMIN — CHLORHEXIDINE GLUCONATE 0.12% ORAL RINSE 15 ML: 1.2 LIQUID ORAL at 08:15

## 2019-01-01 RX ADMIN — PROPOFOL 50 MCG/KG/MIN: 10 INJECTION, EMULSION INTRAVENOUS at 08:25

## 2019-01-01 RX ADMIN — DEXMEDETOMIDINE 1.2 MCG/KG/HR: 100 INJECTION, SOLUTION, CONCENTRATE INTRAVENOUS at 17:44

## 2019-01-01 RX ADMIN — DEXMEDETOMIDINE 0.7 MCG/KG/HR: 100 INJECTION, SOLUTION, CONCENTRATE INTRAVENOUS at 12:04

## 2019-01-01 RX ADMIN — CHLORHEXIDINE GLUCONATE 0.12% ORAL RINSE 15 ML: 1.2 LIQUID ORAL at 20:55

## 2019-01-01 RX ADMIN — METOPROLOL TARTRATE 50 MG: 50 TABLET, FILM COATED ORAL at 20:23

## 2019-01-01 RX ADMIN — Medication 20 MG: at 12:04

## 2019-01-01 RX ADMIN — LISINOPRIL 20 MG: 20 TABLET ORAL at 09:16

## 2019-01-01 RX ADMIN — METOPROLOL TARTRATE 50 MG: 50 TABLET, FILM COATED ORAL at 09:20

## 2019-01-01 RX ADMIN — METOPROLOL TARTRATE 100 MG: 50 TABLET, FILM COATED ORAL at 20:55

## 2019-01-01 RX ADMIN — DEXMEDETOMIDINE 0.2 MCG/KG/HR: 100 INJECTION, SOLUTION, CONCENTRATE INTRAVENOUS at 11:14

## 2019-01-01 RX ADMIN — DEXMEDETOMIDINE 1 MCG/KG/HR: 100 INJECTION, SOLUTION, CONCENTRATE INTRAVENOUS at 20:30

## 2019-01-01 RX ADMIN — METHYLNALTREXONE BROMIDE 8 MG: 8 INJECTION, SOLUTION SUBCUTANEOUS at 15:13

## 2019-01-01 RX ADMIN — SODIUM CHLORIDE 15 UNITS/HR: 9 INJECTION, SOLUTION INTRAVENOUS at 10:20

## 2019-01-01 RX ADMIN — PROPOFOL 50 MCG/KG/MIN: 10 INJECTION, EMULSION INTRAVENOUS at 05:18

## 2019-01-01 RX ADMIN — HEPARIN SODIUM 5000 UNITS: 5000 INJECTION INTRAVENOUS; SUBCUTANEOUS at 21:00

## 2019-01-01 RX ADMIN — DEXMEDETOMIDINE 1 MCG/KG/HR: 100 INJECTION, SOLUTION, CONCENTRATE INTRAVENOUS at 06:14

## 2019-01-01 RX ADMIN — DEXMEDETOMIDINE 1 MCG/KG/HR: 100 INJECTION, SOLUTION, CONCENTRATE INTRAVENOUS at 15:20

## 2019-01-01 RX ADMIN — INSULIN GLARGINE 25 UNITS: 100 INJECTION, SOLUTION SUBCUTANEOUS at 10:01

## 2019-01-01 RX ADMIN — SENNOSIDES AND DOCUSATE SODIUM 2 TABLET: 8.6; 5 TABLET ORAL at 08:19

## 2019-01-01 RX ADMIN — CLONIDINE HYDROCHLORIDE 0.3 MG: 0.1 TABLET ORAL at 00:04

## 2019-01-01 RX ADMIN — QUETIAPINE FUMARATE 25 MG: 25 TABLET ORAL at 08:49

## 2019-01-01 RX ADMIN — DEXMEDETOMIDINE 1 MCG/KG/HR: 100 INJECTION, SOLUTION, CONCENTRATE INTRAVENOUS at 14:41

## 2019-01-01 RX ADMIN — SODIUM CHLORIDE 80 ML/HR: 0.9 INJECTION, SOLUTION INTRAVENOUS at 01:52

## 2019-01-01 RX ADMIN — DEXMEDETOMIDINE 0.7 MCG/KG/HR: 100 INJECTION, SOLUTION, CONCENTRATE INTRAVENOUS at 15:16

## 2019-01-01 RX ADMIN — CHLORHEXIDINE GLUCONATE 0.12% ORAL RINSE 15 ML: 1.2 LIQUID ORAL at 20:26

## 2019-01-01 RX ADMIN — CLONIDINE HYDROCHLORIDE 0.3 MG: 0.1 TABLET ORAL at 18:19

## 2019-01-01 RX ADMIN — CHLORHEXIDINE GLUCONATE 0.12% ORAL RINSE 15 ML: 1.2 LIQUID ORAL at 22:20

## 2019-01-01 RX ADMIN — DEXMEDETOMIDINE 1 MCG/KG/HR: 100 INJECTION, SOLUTION, CONCENTRATE INTRAVENOUS at 16:19

## 2019-01-01 RX ADMIN — INSULIN LISPRO 2 UNITS: 100 INJECTION, SOLUTION INTRAVENOUS; SUBCUTANEOUS at 12:33

## 2019-01-01 RX ADMIN — INSULIN LISPRO 8 UNITS: 100 INJECTION, SOLUTION INTRAVENOUS; SUBCUTANEOUS at 12:33

## 2019-01-01 RX ADMIN — CYANOCOBALAMIN TAB 500 MCG 500 MCG: 500 TAB at 09:16

## 2019-01-01 RX ADMIN — PROPOFOL 10 MCG/KG/MIN: 10 INJECTION, EMULSION INTRAVENOUS at 02:31

## 2019-01-01 RX ADMIN — PROPOFOL 200 MG: 10 INJECTION, EMULSION INTRAVENOUS at 11:30

## 2019-01-01 RX ADMIN — DEXMEDETOMIDINE 1 MCG/KG/HR: 100 INJECTION, SOLUTION, CONCENTRATE INTRAVENOUS at 21:46

## 2019-01-01 RX ADMIN — DEXMEDETOMIDINE 1.2 MCG/KG/HR: 100 INJECTION, SOLUTION, CONCENTRATE INTRAVENOUS at 20:40

## 2019-01-01 RX ADMIN — FENTANYL CITRATE 50 MCG/HR: at 23:10

## 2019-01-01 RX ADMIN — METOPROLOL TARTRATE 100 MG: 50 TABLET, FILM COATED ORAL at 08:34

## 2019-01-01 RX ADMIN — PROPOFOL 45 MCG/KG/MIN: 10 INJECTION, EMULSION INTRAVENOUS at 11:38

## 2019-01-01 RX ADMIN — HYDRALAZINE HYDROCHLORIDE 10 MG: 20 INJECTION INTRAMUSCULAR; INTRAVENOUS at 18:27

## 2019-01-01 RX ADMIN — PROPOFOL 50 MCG/KG/MIN: 10 INJECTION, EMULSION INTRAVENOUS at 12:35

## 2019-01-01 RX ADMIN — METOPROLOL TARTRATE 100 MG: 50 TABLET, FILM COATED ORAL at 20:26

## 2019-01-01 RX ADMIN — PROPOFOL 35 MCG/KG/MIN: 10 INJECTION, EMULSION INTRAVENOUS at 15:28

## 2019-01-01 RX ADMIN — DEXMEDETOMIDINE 1 MCG/KG/HR: 100 INJECTION, SOLUTION, CONCENTRATE INTRAVENOUS at 17:16

## 2019-01-01 RX ADMIN — INSULIN GLARGINE 50 UNITS: 100 INJECTION, SOLUTION SUBCUTANEOUS at 08:19

## 2019-01-01 RX ADMIN — SENNOSIDES AND DOCUSATE SODIUM 2 TABLET: 8.6; 5 TABLET ORAL at 18:18

## 2019-01-01 RX ADMIN — DEXMEDETOMIDINE 1 MCG/KG/HR: 100 INJECTION, SOLUTION, CONCENTRATE INTRAVENOUS at 03:38

## 2019-01-01 RX ADMIN — HYDRALAZINE HYDROCHLORIDE 10 MG: 20 INJECTION INTRAMUSCULAR; INTRAVENOUS at 10:57

## 2019-01-01 RX ADMIN — MORPHINE SULFATE 4 MG: 4 INJECTION INTRAVENOUS at 01:37

## 2019-01-01 RX ADMIN — DEXMEDETOMIDINE 1 MCG/KG/HR: 100 INJECTION, SOLUTION, CONCENTRATE INTRAVENOUS at 19:24

## 2019-01-01 RX ADMIN — HEPARIN SODIUM 5000 UNITS: 5000 INJECTION INTRAVENOUS; SUBCUTANEOUS at 05:17

## 2019-01-01 RX ADMIN — DEXAMETHASONE SODIUM PHOSPHATE 8 MG: 10 INJECTION INTRAMUSCULAR; INTRAVENOUS at 11:22

## 2019-01-01 RX ADMIN — FENTANYL CITRATE 75 MCG/HR: at 17:08

## 2019-01-01 RX ADMIN — DEXMEDETOMIDINE 1.15 MCG/KG/HR: 100 INJECTION, SOLUTION, CONCENTRATE INTRAVENOUS at 08:35

## 2019-01-01 RX ADMIN — INSULIN GLARGINE 50 UNITS: 100 INJECTION, SOLUTION SUBCUTANEOUS at 20:56

## 2019-01-01 RX ADMIN — SODIUM CHLORIDE 5 UNITS/HR: 9 INJECTION, SOLUTION INTRAVENOUS at 13:54

## 2019-01-01 RX ADMIN — CHLORHEXIDINE GLUCONATE 0.12% ORAL RINSE 15 ML: 1.2 LIQUID ORAL at 08:49

## 2019-01-01 RX ADMIN — SODIUM CHLORIDE 8 UNITS/HR: 9 INJECTION, SOLUTION INTRAVENOUS at 22:05

## 2019-01-01 RX ADMIN — DEXMEDETOMIDINE 0.6 MCG/KG/HR: 100 INJECTION, SOLUTION, CONCENTRATE INTRAVENOUS at 23:56

## 2019-01-01 RX ADMIN — DEXMEDETOMIDINE 1 MCG/KG/HR: 100 INJECTION, SOLUTION, CONCENTRATE INTRAVENOUS at 06:37

## 2019-01-01 RX ADMIN — DEXMEDETOMIDINE 0.7 MCG/KG/HR: 100 INJECTION, SOLUTION, CONCENTRATE INTRAVENOUS at 01:36

## 2019-01-01 RX ADMIN — CLONIDINE HYDROCHLORIDE 0.3 MG: 0.1 TABLET ORAL at 00:15

## 2019-01-01 RX ADMIN — DEXAMETHASONE SODIUM PHOSPHATE 10 MG: 4 INJECTION, SOLUTION INTRAMUSCULAR; INTRAVENOUS at 17:44

## 2019-01-01 RX ADMIN — SENNOSIDES AND DOCUSATE SODIUM 2 TABLET: 8.6; 5 TABLET ORAL at 08:34

## 2019-01-01 RX ADMIN — MORPHINE SULFATE 4 MG: 4 INJECTION INTRAVENOUS at 23:28

## 2019-01-01 RX ADMIN — INSULIN LISPRO 2 UNITS: 100 INJECTION, SOLUTION INTRAVENOUS; SUBCUTANEOUS at 09:13

## 2019-01-01 RX ADMIN — SODIUM CHLORIDE, SODIUM GLUCONATE, SODIUM ACETATE, POTASSIUM CHLORIDE, MAGNESIUM CHLORIDE, SODIUM PHOSPHATE, DIBASIC, AND POTASSIUM PHOSPHATE 100 ML/HR: .53; .5; .37; .037; .03; .012; .00082 INJECTION, SOLUTION INTRAVENOUS at 16:57

## 2019-01-01 RX ADMIN — SENNOSIDES AND DOCUSATE SODIUM 1 TABLET: 8.6; 5 TABLET ORAL at 21:00

## 2019-01-01 RX ADMIN — PROPOFOL 40 MCG/KG/MIN: 10 INJECTION, EMULSION INTRAVENOUS at 19:59

## 2019-01-01 RX ADMIN — DEXMEDETOMIDINE 0.7 MCG/KG/HR: 100 INJECTION, SOLUTION, CONCENTRATE INTRAVENOUS at 02:11

## 2019-01-01 RX ADMIN — DEXMEDETOMIDINE 0.7 MCG/KG/HR: 100 INJECTION, SOLUTION, CONCENTRATE INTRAVENOUS at 19:20

## 2019-01-01 RX ADMIN — CLONIDINE HYDROCHLORIDE 0.3 MG: 0.1 TABLET ORAL at 00:13

## 2019-01-01 RX ADMIN — SODIUM CHLORIDE 8 UNITS/HR: 9 INJECTION, SOLUTION INTRAVENOUS at 10:00

## 2019-01-01 RX ADMIN — INSULIN GLARGINE 50 UNITS: 100 INJECTION, SOLUTION SUBCUTANEOUS at 20:26

## 2019-01-01 RX ADMIN — SODIUM CHLORIDE 8 UNITS/HR: 9 INJECTION, SOLUTION INTRAVENOUS at 06:00

## 2019-01-01 RX ADMIN — MIDAZOLAM 1 MG: 1 INJECTION INTRAMUSCULAR; INTRAVENOUS at 11:01

## 2019-01-01 RX ADMIN — POLYETHYLENE GLYCOL 3350 17 G: 17 POWDER, FOR SOLUTION ORAL at 08:49

## 2019-01-01 RX ADMIN — SODIUM CHLORIDE, SODIUM GLUCONATE, SODIUM ACETATE, POTASSIUM CHLORIDE, MAGNESIUM CHLORIDE, SODIUM PHOSPHATE, DIBASIC, AND POTASSIUM PHOSPHATE 50 ML/HR: .53; .5; .37; .037; .03; .012; .00082 INJECTION, SOLUTION INTRAVENOUS at 04:58

## 2019-01-01 RX ADMIN — METHYLPREDNISOLONE SODIUM SUCCINATE 40 MG: 40 INJECTION, POWDER, FOR SOLUTION INTRAMUSCULAR; INTRAVENOUS at 05:46

## 2019-01-01 RX ADMIN — SUCCINYLCHOLINE CHLORIDE 100 MG: 20 INJECTION, SOLUTION INTRAMUSCULAR; INTRAVENOUS at 11:30

## 2019-01-01 RX ADMIN — DEXMEDETOMIDINE 0.7 MCG/KG/HR: 100 INJECTION, SOLUTION, CONCENTRATE INTRAVENOUS at 02:28

## 2019-01-01 RX ADMIN — FUROSEMIDE 20 MG: 10 INJECTION, SOLUTION INTRAMUSCULAR; INTRAVENOUS at 12:18

## 2019-01-01 RX ADMIN — ATROPINE SULFATE 2 DROP: 10 SOLUTION/ DROPS OPHTHALMIC at 20:27

## 2019-01-01 RX ADMIN — DEXMEDETOMIDINE 1 MCG/KG/HR: 100 INJECTION, SOLUTION, CONCENTRATE INTRAVENOUS at 02:05

## 2019-01-01 RX ADMIN — CHLORHEXIDINE GLUCONATE 0.12% ORAL RINSE 15 ML: 1.2 LIQUID ORAL at 09:20

## 2019-01-01 RX ADMIN — SENNOSIDES AND DOCUSATE SODIUM 2 TABLET: 8.6; 5 TABLET ORAL at 18:26

## 2019-01-01 RX ADMIN — BISACODYL 10 MG: 10 SUPPOSITORY RECTAL at 08:20

## 2019-01-01 RX ADMIN — METOPROLOL TARTRATE 100 MG: 50 TABLET, FILM COATED ORAL at 08:49

## 2019-01-01 RX ADMIN — DEXMEDETOMIDINE 1 MCG/KG/HR: 100 INJECTION, SOLUTION, CONCENTRATE INTRAVENOUS at 09:46

## 2019-01-01 RX ADMIN — DEXMEDETOMIDINE 0.7 MCG/KG/HR: 100 INJECTION, SOLUTION, CONCENTRATE INTRAVENOUS at 11:59

## 2019-01-01 RX ADMIN — QUETIAPINE FUMARATE 25 MG: 25 TABLET ORAL at 08:20

## 2019-01-01 RX ADMIN — LISINOPRIL 20 MG: 20 TABLET ORAL at 08:20

## 2019-01-01 RX ADMIN — INSULIN GLARGINE 12 UNITS: 100 INJECTION, SOLUTION SUBCUTANEOUS at 08:46

## 2019-01-01 RX ADMIN — Medication 20 MG: at 10:27

## 2019-01-01 RX ADMIN — SODIUM CHLORIDE 8 UNITS/HR: 9 INJECTION, SOLUTION INTRAVENOUS at 21:55

## 2019-01-01 RX ADMIN — FENTANYL CITRATE 50 MCG: 50 INJECTION, SOLUTION INTRAMUSCULAR; INTRAVENOUS at 11:45

## 2019-01-01 RX ADMIN — INSULIN LISPRO 3 UNITS: 100 INJECTION, SOLUTION INTRAVENOUS; SUBCUTANEOUS at 18:06

## 2019-01-01 RX ADMIN — DEXMEDETOMIDINE 1 MCG/KG/HR: 100 INJECTION, SOLUTION, CONCENTRATE INTRAVENOUS at 04:19

## 2019-01-01 RX ADMIN — MIDAZOLAM 2 MG: 1 INJECTION INTRAMUSCULAR; INTRAVENOUS at 11:23

## 2019-01-01 RX ADMIN — INSULIN GLARGINE 25 UNITS: 100 INJECTION, SOLUTION SUBCUTANEOUS at 09:23

## 2019-01-01 RX ADMIN — PROPOFOL 50 MCG/KG/MIN: 10 INJECTION, EMULSION INTRAVENOUS at 22:53

## 2019-01-01 RX ADMIN — MORPHINE SULFATE 4 MG: 4 INJECTION INTRAVENOUS at 22:57

## 2019-01-01 RX ADMIN — HEPARIN SODIUM 5000 UNITS: 5000 INJECTION INTRAVENOUS; SUBCUTANEOUS at 22:53

## 2019-01-01 RX ADMIN — INSULIN LISPRO 3 UNITS: 100 INJECTION, SOLUTION INTRAVENOUS; SUBCUTANEOUS at 23:59

## 2019-01-01 RX ADMIN — DEXAMETHASONE SODIUM PHOSPHATE 10 MG: 4 INJECTION, SOLUTION INTRAMUSCULAR; INTRAVENOUS at 05:26

## 2019-01-01 RX ADMIN — METOPROLOL TARTRATE 100 MG: 100 TABLET ORAL at 09:15

## 2019-01-01 RX ADMIN — LORAZEPAM 1 MG: 2 INJECTION INTRAMUSCULAR; INTRAVENOUS at 18:09

## 2019-01-01 RX ADMIN — DEXMEDETOMIDINE 1.2 MCG/KG/HR: 100 INJECTION, SOLUTION, CONCENTRATE INTRAVENOUS at 22:25

## 2019-01-01 RX ADMIN — SODIUM CHLORIDE 8 UNITS/HR: 9 INJECTION, SOLUTION INTRAVENOUS at 12:30

## 2019-01-01 RX ADMIN — FENTANYL CITRATE 50 MCG: 50 INJECTION, SOLUTION INTRAMUSCULAR; INTRAVENOUS at 10:34

## 2019-01-01 RX ADMIN — CHLORHEXIDINE GLUCONATE 0.12% ORAL RINSE 15 ML: 1.2 LIQUID ORAL at 20:09

## 2019-01-01 RX ADMIN — DEXMEDETOMIDINE 0.7 MCG/KG/HR: 100 INJECTION, SOLUTION, CONCENTRATE INTRAVENOUS at 05:00

## 2019-01-01 RX ADMIN — Medication 20 MG: at 08:46

## 2019-01-01 RX ADMIN — LORAZEPAM 0.5 MG: 2 INJECTION INTRAMUSCULAR at 20:33

## 2019-01-01 RX ADMIN — HEPARIN SODIUM 5000 UNITS: 5000 INJECTION INTRAVENOUS; SUBCUTANEOUS at 14:17

## 2019-01-01 RX ADMIN — SENNOSIDES AND DOCUSATE SODIUM 2 TABLET: 8.6; 5 TABLET ORAL at 10:27

## 2019-01-01 RX ADMIN — DEXMEDETOMIDINE 1 MCG/KG/HR: 100 INJECTION, SOLUTION, CONCENTRATE INTRAVENOUS at 07:30

## 2019-01-01 RX ADMIN — HEPARIN SODIUM 5000 UNITS: 5000 INJECTION INTRAVENOUS; SUBCUTANEOUS at 05:24

## 2019-01-01 RX ADMIN — FENTANYL CITRATE 50 MCG/HR: at 19:59

## 2019-01-01 RX ADMIN — INSULIN GLARGINE 50 UNITS: 100 INJECTION, SOLUTION SUBCUTANEOUS at 08:49

## 2019-01-01 RX ADMIN — HEPARIN SODIUM 5000 UNITS: 5000 INJECTION INTRAVENOUS; SUBCUTANEOUS at 14:45

## 2019-01-01 RX ADMIN — HEPARIN SODIUM 5000 UNITS: 5000 INJECTION INTRAVENOUS; SUBCUTANEOUS at 05:19

## 2019-01-01 RX ADMIN — CLONIDINE HYDROCHLORIDE 0.3 MG: 0.1 TABLET ORAL at 06:06

## 2019-01-01 RX ADMIN — CLONIDINE HYDROCHLORIDE 0.3 MG: 0.1 TABLET ORAL at 14:13

## 2019-01-01 RX ADMIN — SODIUM CHLORIDE 15 UNITS/HR: 9 INJECTION, SOLUTION INTRAVENOUS at 15:48

## 2019-01-01 RX ADMIN — DEXMEDETOMIDINE 1 MCG/KG/HR: 100 INJECTION, SOLUTION, CONCENTRATE INTRAVENOUS at 10:20

## 2019-01-01 RX ADMIN — HYDRALAZINE HYDROCHLORIDE 10 MG: 20 INJECTION INTRAMUSCULAR; INTRAVENOUS at 19:36

## 2019-01-01 RX ADMIN — SCOPALAMINE 1 PATCH: 1 PATCH, EXTENDED RELEASE TRANSDERMAL at 00:47

## 2019-01-01 RX ADMIN — DEXMEDETOMIDINE 0.7 MCG/KG/HR: 100 INJECTION, SOLUTION, CONCENTRATE INTRAVENOUS at 22:03

## 2019-01-01 RX ADMIN — DEXMEDETOMIDINE 1.2 MCG/KG/HR: 100 INJECTION, SOLUTION, CONCENTRATE INTRAVENOUS at 17:06

## 2019-01-01 RX ADMIN — FENTANYL CITRATE 75 MCG/HR: at 11:07

## 2019-01-01 RX ADMIN — GLYCOPYRROLATE 0.2 MG: 0.2 INJECTION INTRAMUSCULAR; INTRAVENOUS at 23:28

## 2019-01-01 RX ADMIN — MIDAZOLAM HYDROCHLORIDE 10 MG: 1 INJECTION, SOLUTION INTRAMUSCULAR; INTRAVENOUS at 19:05

## 2019-01-01 RX ADMIN — DEXMEDETOMIDINE 1.3 MCG/KG/HR: 100 INJECTION, SOLUTION, CONCENTRATE INTRAVENOUS at 03:14

## 2019-01-01 RX ADMIN — CLONIDINE HYDROCHLORIDE 0.3 MG: 0.1 TABLET ORAL at 15:13

## 2019-01-01 RX ADMIN — PROPOFOL 37 MCG/KG/MIN: 10 INJECTION, EMULSION INTRAVENOUS at 13:37

## 2019-01-01 RX ADMIN — CHLORHEXIDINE GLUCONATE 0.12% ORAL RINSE 15 ML: 1.2 LIQUID ORAL at 10:28

## 2019-01-01 RX ADMIN — POLYETHYLENE GLYCOL 3350 17 G: 17 POWDER, FOR SOLUTION ORAL at 08:34

## 2019-01-01 RX ADMIN — CLONIDINE HYDROCHLORIDE 0.3 MG: 0.1 TABLET ORAL at 18:27

## 2019-01-01 RX ADMIN — MORPHINE SULFATE 2 MG: 10 INJECTION INTRAVENOUS at 19:04

## 2019-01-01 RX ADMIN — PROPOFOL 35 MCG/KG/MIN: 10 INJECTION, EMULSION INTRAVENOUS at 08:17

## 2019-01-01 RX ADMIN — POLYETHYLENE GLYCOL 3350 17 G: 17 POWDER, FOR SOLUTION ORAL at 10:27

## 2019-01-01 RX ADMIN — MORPHINE SULFATE 4 MG: 4 INJECTION INTRAVENOUS at 22:34

## 2019-01-01 RX ADMIN — LISINOPRIL 20 MG: 20 TABLET ORAL at 12:18

## 2019-01-01 RX ADMIN — HEPARIN SODIUM 5000 UNITS: 5000 INJECTION INTRAVENOUS; SUBCUTANEOUS at 13:58

## 2019-01-01 RX ADMIN — PROPOFOL 50 MCG/KG/MIN: 10 INJECTION, EMULSION INTRAVENOUS at 18:05

## 2019-01-01 RX ADMIN — METOPROLOL TARTRATE 50 MG: 50 TABLET, FILM COATED ORAL at 09:52

## 2019-01-01 RX ADMIN — DEXMEDETOMIDINE 1.3 MCG/KG/HR: 100 INJECTION, SOLUTION, CONCENTRATE INTRAVENOUS at 19:06

## 2019-01-01 RX ADMIN — PANTOPRAZOLE SODIUM 40 MG: 40 INJECTION, POWDER, FOR SOLUTION INTRAVENOUS at 09:16

## 2019-01-01 RX ADMIN — DOXORUBICIN HYDROCHLORIDE 20 MG: 2 INJECTION, SOLUTION INTRAVENOUS at 15:15

## 2019-01-01 RX ADMIN — MORPHINE SULFATE 4 MG: 4 INJECTION INTRAVENOUS at 18:09

## 2019-01-01 RX ADMIN — SENNOSIDES AND DOCUSATE SODIUM 2 TABLET: 8.6; 5 TABLET ORAL at 19:20

## 2019-01-01 RX ADMIN — FUROSEMIDE 20 MG: 10 INJECTION, SOLUTION INTRAMUSCULAR; INTRAVENOUS at 10:28

## 2019-01-01 RX ADMIN — PROPOFOL 45 MCG/KG/MIN: 10 INJECTION, EMULSION INTRAVENOUS at 16:56

## 2019-01-01 RX ADMIN — PROPOFOL 30 MCG/KG/MIN: 10 INJECTION, EMULSION INTRAVENOUS at 01:30

## 2019-01-01 RX ADMIN — SODIUM CHLORIDE, SODIUM GLUCONATE, SODIUM ACETATE, POTASSIUM CHLORIDE, MAGNESIUM CHLORIDE, SODIUM PHOSPHATE, DIBASIC, AND POTASSIUM PHOSPHATE 100 ML/HR: .53; .5; .37; .037; .03; .012; .00082 INJECTION, SOLUTION INTRAVENOUS at 08:15

## 2019-01-01 RX ADMIN — SODIUM CHLORIDE 4 UNITS/HR: 9 INJECTION, SOLUTION INTRAVENOUS at 04:04

## 2019-01-01 RX ADMIN — DEXAMETHASONE SODIUM PHOSPHATE 10 MG: 4 INJECTION, SOLUTION INTRAMUSCULAR; INTRAVENOUS at 22:16

## 2019-01-01 RX ADMIN — LORAZEPAM 0.5 MG: 2 INJECTION, SOLUTION INTRAMUSCULAR; INTRAVENOUS at 16:59

## 2019-01-01 RX ADMIN — METHYLPREDNISOLONE SODIUM SUCCINATE 40 MG: 40 INJECTION, POWDER, FOR SOLUTION INTRAMUSCULAR; INTRAVENOUS at 14:27

## 2019-01-01 RX ADMIN — DEXMEDETOMIDINE 1.2 MCG/KG/HR: 100 INJECTION, SOLUTION, CONCENTRATE INTRAVENOUS at 14:30

## 2019-01-01 RX ADMIN — INSULIN LISPRO 4 UNITS: 100 INJECTION, SOLUTION INTRAVENOUS; SUBCUTANEOUS at 00:58

## 2019-01-01 RX ADMIN — POTASSIUM CHLORIDE 40 MEQ: 20 SOLUTION ORAL at 09:52

## 2019-01-01 RX ADMIN — DEXAMETHASONE SODIUM PHOSPHATE 10 MG: 4 INJECTION, SOLUTION INTRAMUSCULAR; INTRAVENOUS at 21:38

## 2019-01-01 RX ADMIN — Medication 20 MG: at 10:05

## 2019-01-01 RX ADMIN — SODIUM CHLORIDE 12 UNITS/HR: 9 INJECTION, SOLUTION INTRAVENOUS at 00:49

## 2019-01-01 RX ADMIN — FENTANYL CITRATE 75 MCG/HR: at 01:35

## 2019-01-01 RX ADMIN — LORAZEPAM 1 MG: 2 INJECTION INTRAMUSCULAR; INTRAVENOUS at 17:45

## 2019-01-01 RX ADMIN — FENTANYL CITRATE 75 MCG/HR: at 08:40

## 2019-01-01 RX ADMIN — MIDAZOLAM 0.5 MG: 1 INJECTION INTRAMUSCULAR; INTRAVENOUS at 10:50

## 2019-01-01 RX ADMIN — HYDRALAZINE HYDROCHLORIDE 10 MG: 20 INJECTION INTRAMUSCULAR; INTRAVENOUS at 17:13

## 2019-01-01 RX ADMIN — CLONIDINE HYDROCHLORIDE 0.3 MG: 0.1 TABLET ORAL at 12:41

## 2019-01-01 RX ADMIN — DEXMEDETOMIDINE 1.3 MCG/KG/HR: 100 INJECTION, SOLUTION, CONCENTRATE INTRAVENOUS at 04:43

## 2019-01-01 RX ADMIN — DEXMEDETOMIDINE 1 MCG/KG/HR: 100 INJECTION, SOLUTION, CONCENTRATE INTRAVENOUS at 09:00

## 2019-01-01 RX ADMIN — PANTOPRAZOLE SODIUM 40 MG: 40 INJECTION, POWDER, FOR SOLUTION INTRAVENOUS at 09:20

## 2019-01-01 RX ADMIN — IOHEXOL 85 ML: 350 INJECTION, SOLUTION INTRAVENOUS at 20:34

## 2019-01-01 RX ADMIN — DEXAMETHASONE SODIUM PHOSPHATE 10 MG: 4 INJECTION, SOLUTION INTRAMUSCULAR; INTRAVENOUS at 14:17

## 2019-01-01 RX ADMIN — DEXMEDETOMIDINE 1 MCG/KG/HR: 100 INJECTION, SOLUTION, CONCENTRATE INTRAVENOUS at 23:38

## 2019-01-01 RX ADMIN — HEPARIN SODIUM 5000 UNITS: 5000 INJECTION INTRAVENOUS; SUBCUTANEOUS at 05:13

## 2019-01-01 RX ADMIN — HEPARIN SODIUM 5000 UNITS: 5000 INJECTION INTRAVENOUS; SUBCUTANEOUS at 05:14

## 2019-01-01 RX ADMIN — CLONIDINE HYDROCHLORIDE 0.3 MG: 0.1 TABLET ORAL at 05:24

## 2019-01-01 RX ADMIN — AMLODIPINE BESYLATE 5 MG: 5 TABLET ORAL at 08:20

## 2019-01-01 RX ADMIN — FENTANYL CITRATE 50 MCG: 50 INJECTION, SOLUTION INTRAMUSCULAR; INTRAVENOUS at 16:55

## 2019-01-01 RX ADMIN — PROPOFOL 40 MCG/KG/MIN: 10 INJECTION, EMULSION INTRAVENOUS at 03:11

## 2019-01-01 RX ADMIN — LISINOPRIL 20 MG: 20 TABLET ORAL at 08:49

## 2019-01-01 RX ADMIN — DEXAMETHASONE SODIUM PHOSPHATE: 10 INJECTION, SOLUTION INTRAMUSCULAR; INTRAVENOUS at 14:55

## 2019-01-01 RX ADMIN — ROCURONIUM BROMIDE 30 MG: 10 INJECTION INTRAVENOUS at 12:35

## 2019-01-01 RX ADMIN — FENTANYL CITRATE 75 MCG/HR: at 01:48

## 2019-01-01 RX ADMIN — FENTANYL CITRATE 50 MCG: 50 INJECTION, SOLUTION INTRAMUSCULAR; INTRAVENOUS at 11:47

## 2019-01-01 RX ADMIN — DEXMEDETOMIDINE 0.7 MCG/KG/HR: 100 INJECTION, SOLUTION, CONCENTRATE INTRAVENOUS at 18:37

## 2019-01-01 RX ADMIN — SENNOSIDES AND DOCUSATE SODIUM 2 TABLET: 8.6; 5 TABLET ORAL at 08:49

## 2019-01-01 RX ADMIN — ENOXAPARIN SODIUM 40 MG: 40 INJECTION SUBCUTANEOUS at 08:19

## 2019-01-01 RX ADMIN — ENOXAPARIN SODIUM 40 MG: 40 INJECTION SUBCUTANEOUS at 14:36

## 2019-01-01 RX ADMIN — HEPARIN SODIUM 5000 UNITS: 5000 INJECTION INTRAVENOUS; SUBCUTANEOUS at 06:06

## 2019-01-01 RX ADMIN — METOPROLOL TARTRATE 75 MG: 50 TABLET, FILM COATED ORAL at 20:17

## 2019-01-01 RX ADMIN — PROPOFOL 50 MCG/KG/MIN: 10 INJECTION, EMULSION INTRAVENOUS at 14:41

## 2019-01-01 RX ADMIN — CHLORHEXIDINE GLUCONATE 0.12% ORAL RINSE 15 ML: 1.2 LIQUID ORAL at 14:27

## 2019-01-01 RX ADMIN — QUETIAPINE FUMARATE 25 MG: 25 TABLET ORAL at 18:18

## 2019-01-01 RX ADMIN — FENTANYL CITRATE 50 MCG: 50 INJECTION, SOLUTION INTRAMUSCULAR; INTRAVENOUS at 08:46

## 2019-01-01 RX ADMIN — SODIUM CHLORIDE, SODIUM GLUCONATE, SODIUM ACETATE, POTASSIUM CHLORIDE, MAGNESIUM CHLORIDE, SODIUM PHOSPHATE, DIBASIC, AND POTASSIUM PHOSPHATE 500 ML: .53; .5; .37; .037; .03; .012; .00082 INJECTION, SOLUTION INTRAVENOUS at 11:14

## 2019-01-01 RX ADMIN — POTASSIUM CHLORIDE 20 MEQ: 20 SOLUTION ORAL at 09:23

## 2019-01-01 RX ADMIN — PROPOFOL 40 MCG/KG/MIN: 10 INJECTION, EMULSION INTRAVENOUS at 05:14

## 2019-01-01 RX ADMIN — METOPROLOL TARTRATE 100 MG: 50 TABLET, FILM COATED ORAL at 08:20

## 2019-01-01 RX ADMIN — SODIUM CHLORIDE, SODIUM GLUCONATE, SODIUM ACETATE, POTASSIUM CHLORIDE, MAGNESIUM CHLORIDE, SODIUM PHOSPHATE, DIBASIC, AND POTASSIUM PHOSPHATE 100 ML/HR: .53; .5; .37; .037; .03; .012; .00082 INJECTION, SOLUTION INTRAVENOUS at 22:52

## 2019-01-01 RX ADMIN — DEXMEDETOMIDINE 1.3 MCG/KG/HR: 100 INJECTION, SOLUTION, CONCENTRATE INTRAVENOUS at 06:19

## 2019-01-01 RX ADMIN — HEPARIN SODIUM 5000 UNITS: 5000 INJECTION INTRAVENOUS; SUBCUTANEOUS at 21:38

## 2019-01-01 RX ADMIN — HEPARIN SODIUM 5000 UNITS: 5000 INJECTION INTRAVENOUS; SUBCUTANEOUS at 22:46

## 2019-01-01 RX ADMIN — METOPROLOL TARTRATE 100 MG: 100 TABLET ORAL at 02:11

## 2019-01-01 RX ADMIN — PANTOPRAZOLE SODIUM 40 MG: 40 INJECTION, POWDER, FOR SOLUTION INTRAVENOUS at 08:15

## 2019-01-01 RX ADMIN — QUETIAPINE FUMARATE 25 MG: 25 TABLET ORAL at 11:07

## 2019-01-01 RX ADMIN — GLYCOPYRROLATE 0.2 MG: 0.2 INJECTION INTRAMUSCULAR; INTRAVENOUS at 17:44

## 2019-01-01 RX ADMIN — HALOPERIDOL LACTATE 1 MG: 5 INJECTION INTRAMUSCULAR at 11:08

## 2019-01-01 RX ADMIN — SODIUM CHLORIDE 1000 ML: 0.9 INJECTION, SOLUTION INTRAVENOUS at 19:34

## 2019-01-01 RX ADMIN — SODIUM CHLORIDE 125 ML/HR: 0.9 INJECTION, SOLUTION INTRAVENOUS at 09:59

## 2019-01-01 RX ADMIN — AMLODIPINE BESYLATE 5 MG: 5 TABLET ORAL at 09:16

## 2019-01-01 RX ADMIN — HYDRALAZINE HYDROCHLORIDE 10 MG: 20 INJECTION INTRAMUSCULAR; INTRAVENOUS at 12:15

## 2019-01-01 RX ADMIN — METOPROLOL TARTRATE 100 MG: 50 TABLET, FILM COATED ORAL at 22:20

## 2019-01-01 RX ADMIN — FENTANYL CITRATE 50 MCG: 50 INJECTION, SOLUTION INTRAMUSCULAR; INTRAVENOUS at 05:39

## 2019-01-01 RX ADMIN — CHLORHEXIDINE GLUCONATE 0.12% ORAL RINSE 15 ML: 1.2 LIQUID ORAL at 08:35

## 2019-01-01 RX ADMIN — FENTANYL CITRATE 50 MCG: 50 INJECTION INTRAMUSCULAR; INTRAVENOUS at 16:58

## 2019-01-01 RX ADMIN — HEPARIN SODIUM 5000 UNITS: 5000 INJECTION INTRAVENOUS; SUBCUTANEOUS at 22:10

## 2019-01-01 RX ADMIN — DEXMEDETOMIDINE 1 MCG/KG/HR: 100 INJECTION, SOLUTION, CONCENTRATE INTRAVENOUS at 13:30

## 2019-01-01 RX ADMIN — POLYETHYLENE GLYCOL 3350 17 G: 17 POWDER, FOR SOLUTION ORAL at 08:19

## 2019-01-01 RX ADMIN — HEPARIN SODIUM 5000 UNITS: 5000 INJECTION INTRAVENOUS; SUBCUTANEOUS at 14:41

## 2019-01-01 RX ADMIN — MORPHINE SULFATE 4 MG: 4 INJECTION INTRAVENOUS at 23:47

## 2019-01-01 RX ADMIN — METOPROLOL TARTRATE 100 MG: 50 TABLET, FILM COATED ORAL at 10:27

## 2019-01-01 RX ADMIN — CLONIDINE HYDROCHLORIDE 0.3 MG: 0.1 TABLET ORAL at 12:05

## 2019-01-01 RX ADMIN — CLONIDINE HYDROCHLORIDE 0.3 MG: 0.1 TABLET ORAL at 05:02

## 2019-01-01 RX ADMIN — CHLORHEXIDINE GLUCONATE 0.12% ORAL RINSE 15 ML: 1.2 LIQUID ORAL at 22:00

## 2019-01-01 RX ADMIN — INSULIN LISPRO 4 UNITS: 100 INJECTION, SOLUTION INTRAVENOUS; SUBCUTANEOUS at 18:11

## 2019-01-01 RX ADMIN — FENTANYL CITRATE 50 MCG: 50 INJECTION, SOLUTION INTRAMUSCULAR; INTRAVENOUS at 09:30

## 2019-01-01 RX ADMIN — QUETIAPINE FUMARATE 25 MG: 25 TABLET ORAL at 19:19

## 2019-01-01 RX ADMIN — FENTANYL CITRATE 50 MCG: 50 INJECTION, SOLUTION INTRAMUSCULAR; INTRAVENOUS at 00:17

## 2019-01-01 RX ADMIN — INSULIN LISPRO 3 UNITS: 100 INJECTION, SOLUTION INTRAVENOUS; SUBCUTANEOUS at 05:15

## 2019-01-01 RX ADMIN — Medication 2 MG/HR: at 17:40

## 2019-01-01 RX ADMIN — PROPOFOL 50 MCG/KG/MIN: 10 INJECTION, EMULSION INTRAVENOUS at 11:44

## 2019-01-01 RX ADMIN — CHLORHEXIDINE GLUCONATE 0.12% ORAL RINSE 15 ML: 1.2 LIQUID ORAL at 20:17

## 2019-01-01 RX ADMIN — DEXMEDETOMIDINE 1.2 MCG/KG/HR: 100 INJECTION, SOLUTION, CONCENTRATE INTRAVENOUS at 00:14

## 2019-01-01 RX ADMIN — CLONIDINE HYDROCHLORIDE 0.3 MG: 0.1 TABLET ORAL at 19:20

## 2019-01-01 RX ADMIN — CHLORHEXIDINE GLUCONATE 0.12% ORAL RINSE 15 ML: 1.2 LIQUID ORAL at 22:46

## 2019-01-01 RX ADMIN — CHLORHEXIDINE GLUCONATE 0.12% ORAL RINSE 15 ML: 1.2 LIQUID ORAL at 09:52

## 2019-01-01 RX ADMIN — FENTANYL CITRATE 50 MCG: 50 INJECTION, SOLUTION INTRAMUSCULAR; INTRAVENOUS at 19:35

## 2019-01-01 RX ADMIN — SODIUM CHLORIDE: 0.9 INJECTION, SOLUTION INTRAVENOUS at 11:39

## 2019-01-01 RX ADMIN — ROCURONIUM BROMIDE 15 MG: 10 INJECTION INTRAVENOUS at 11:47

## 2019-01-01 RX ADMIN — DEXTROSE MONOHYDRATE 25 ML: 25 INJECTION, SOLUTION INTRAVENOUS at 16:00

## 2019-01-01 RX ADMIN — SODIUM CHLORIDE 4 UNITS/HR: 9 INJECTION, SOLUTION INTRAVENOUS at 00:08

## 2019-01-01 RX ADMIN — DEXMEDETOMIDINE 1 MCG/KG/HR: 100 INJECTION, SOLUTION, CONCENTRATE INTRAVENOUS at 08:40

## 2019-01-01 RX ADMIN — DEXMEDETOMIDINE 0.4 MCG/KG/HR: 100 INJECTION, SOLUTION, CONCENTRATE INTRAVENOUS at 14:18

## 2019-01-01 RX ADMIN — MIDAZOLAM 0.5 MG: 1 INJECTION INTRAMUSCULAR; INTRAVENOUS at 10:34

## 2019-01-01 RX ADMIN — MIDAZOLAM 2 MG/HR: 5 INJECTION INTRAMUSCULAR; INTRAVENOUS at 19:04

## 2019-01-01 RX ADMIN — DEXMEDETOMIDINE 0.7 MCG/KG/HR: 100 INJECTION, SOLUTION, CONCENTRATE INTRAVENOUS at 01:24

## 2019-01-01 RX ADMIN — INSULIN LISPRO 6 UNITS: 100 INJECTION, SOLUTION INTRAVENOUS; SUBCUTANEOUS at 06:40

## 2019-01-01 RX ADMIN — FENTANYL CITRATE 50 MCG: 50 INJECTION INTRAMUSCULAR; INTRAVENOUS at 14:38

## 2019-01-01 RX ADMIN — DEXMEDETOMIDINE 0.7 MCG/KG/HR: 100 INJECTION, SOLUTION, CONCENTRATE INTRAVENOUS at 22:39

## 2019-01-01 RX ADMIN — AMLODIPINE BESYLATE 5 MG: 5 TABLET ORAL at 08:49

## 2019-01-01 RX ADMIN — FENTANYL CITRATE 50 MCG: 50 INJECTION, SOLUTION INTRAMUSCULAR; INTRAVENOUS at 11:01

## 2019-01-01 RX ADMIN — INSULIN GLARGINE 30 UNITS: 100 INJECTION, SOLUTION SUBCUTANEOUS at 10:26

## 2019-01-01 RX ADMIN — DEXMEDETOMIDINE 0.7 MCG/KG/HR: 100 INJECTION, SOLUTION, CONCENTRATE INTRAVENOUS at 15:34

## 2019-01-01 RX ADMIN — FENTANYL CITRATE 50 MCG: 50 INJECTION, SOLUTION INTRAMUSCULAR; INTRAVENOUS at 12:00

## 2019-01-01 RX ADMIN — PROPOFOL 40 MCG/KG/MIN: 10 INJECTION, EMULSION INTRAVENOUS at 23:25

## 2019-01-01 RX ADMIN — OXYCODONE HYDROCHLORIDE 5 MG: 5 SOLUTION ORAL at 15:23

## 2019-01-01 RX ADMIN — LORAZEPAM 0.5 MG: 2 INJECTION, SOLUTION INTRAMUSCULAR; INTRAVENOUS at 20:33

## 2019-01-01 RX ADMIN — HEPARIN SODIUM 5000 UNITS: 5000 INJECTION INTRAVENOUS; SUBCUTANEOUS at 22:20

## 2019-01-01 RX ADMIN — LIDOCAINE HYDROCHLORIDE 100 MG: 10 INJECTION, SOLUTION INFILTRATION; PERINEURAL at 11:30

## 2019-01-02 NOTE — TELEPHONE ENCOUNTER
Attempted to contact Kulwinder Kapadia to set up nutrition consultation after receiving notification by Radiation Oncology RN Estevan Lancaster that pt is appropriate for oncology nutrition care (reason for referral: HNC dx and TF)  No answer, left voice message with reason for today's call and this RDs contact information asking that Kulwinder Kapadia call back as able/desired

## 2019-01-02 NOTE — TELEPHONE ENCOUNTER
Dr  Lanice Anderson called to talk to Dr Mansi Carolina regarding the patient  Dr Aryan Rosenthal can be reached on [phone]

## 2019-01-04 NOTE — TELEPHONE ENCOUNTER
Contacted Harjinder to set up nutrition consultation after receiving notification by TORI Ghosh that pt is appropriate for oncology nutrition care (reason for referral: HNC dx and TF)  Pt declined setting up nutrition consultation until treatment schedule more established  We did have a brief discussion of nutrition goals for pending head and neck CA tx  Discussed goal of wt maintenance leading into/during treatment and reviewed and ways to improve calorie/protein intake on pureed/liquid diet  Pt reports he has been making homemade smoothies and milkshakes, we discussed ways to boost kcal/pro content of these shakes  Also discussed lower sugar nutritional supplements such as Boost Max and Boost Glucose Control High Protein as pt is concerned about sugary beverages d/t DM  Informed pt I will reach out to him next week following 1/7/19 radiation simulation appt to schedule initial consult  Pt has RD contact info encouraged him to reach out prior to my reaching out to him if need be

## 2019-01-07 NOTE — TELEPHONE ENCOUNTER
Spoke w/ pt's wife/emergency contact Lexie Robert re: establishing nutrition care during pt's upcoming CA tx  Brief discussion w/ Lexie Robert re: goal of weight maintenance and importance of adequate kcal/protein given upcoming treatment regimen  Lexie Robert reports she and Shahram Mikayla trying to limit carbohydrates x past few days as they were told BG should be around 150 mg/dL for PET/CT tomorrow  Discussed sources of protein/healthy fats in diet to replace kcal from carbohydrates  Also answered Philly's questions re: initiation of feeding tube use (not in use at this time)  Initial oncology nutrition consult scheduled 1/21/19 @ 1:15pm prior to new start radiation visit

## 2019-01-10 NOTE — LETTER
January 10, 2019     Sade Peres, DO  [address]    Patient: Leydi Cody   YOB: 1955   Date of Visit: 1/10/2019       Dear Dr Josemanuel Quinteros: Thank you for referring Leydi Cody to me for evaluation  Below are my notes for this consultation  If you have questions, please do not hesitate to call me  I look forward to following your patient along with you  Sincerely,        Colton Rivers MD        CC: MD Silvana Oswald MD Herby Salk, MD  1/10/2019  4:38 PM  Sign at close encounter  Hematology / Oncology Outpatient Consult Note    Leydi Cody 59 y o  male [id number]         Date:  1/10/2019    Assessment / Plan:  A 18-year-old gentleman with 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  He has large locally invading left thyroid tumor with multiple lung and bone metastasis  He presents today with his wife and son and daughter to discuss treatment option as well as prognosis  I had extensive discussion regarding metastatic anaplastic thyroid carcinoma  I told them that this is rare disease  Therefore, there is no large data regarding how to treat this type per cancer  Since his primary tumor is locally invading causing significant symptoms, radiation treatment for palliative intent is reasonable  I think it is most reasonable to add concurrent chemo therapy with low-dose doxorubicin 10mg/m2 weekly, once the radiation therapy started  After the concurrent chemoradiation, I will strongly consider doxorubicin combined with other chemotherapeutic drug  We also had long discussion regarding prognosis  I told him that this is treatable but not curable disease  Prognosis for metastatic anaplastic thyroid carcinoma is generally poor  His prognosis may be between 6-12 months  They understood    Since his tumor arise from midline structure, I would like to obtain AFP and HCG to rule out germ cell tumor which is highly unlikely with his age  He and his family are in agreement with my recommendations  Subjective:     HPI:  A 61-year-old gentleman who has hypertension and diabetes  In summer of 2018, he started to have voice change  In December 2019, he noticed left lower neck pulling sensation which she brought to medical attention  He underwent ultrasound which showed large thyroid mass  Around the same time, he started to have difficulty of swallowing  He was hospitalized and underwent pack to placement  He underwent biopsy of left thyroid mass which showed undifferentiated carcinoma negative for TTF 1   CT scan of chest showed multiple lung metastasis  He subsequently underwent PET-CT scan which showed large left thyroid mass invading to C7, esophagus  Multiple hypermetabolic lung metastasis as well as multiple osseous hypermetabolic metastasis were noted  He presents today with his wife and son and daughter to discuss treatment options  He was previously seen by Dr Diana Daniel who recommended palliative radiation therapy  He continued to have some hoarseness as well as difficulty of swallowing with solid food  He is able to drink liquids  He has not started using feeding tube for nutrition support  He has no complaint of pain  He has no cough, sputum production or shortness of breast   He has recent 15 lb weight loss  His performance status is 0 to 1/4 on the ECOG scale  Interval History:          Objective:     Primary Diagnosis:    Metastatic anaplastic thyroid carcinoma, diagnosed in January 2019  Cancer Staging:  Cancer Staging  No matching staging information was found for the patient  Previous Hematologic/ Oncologic Treatment:         Current Hematologic/ Oncologic Treatment:      Concurrent chemoradiation with low-dose doxorubicin to be started soon  Disease Status: To be determined      Test Results:    Pathology:    Fine-needle aspiration from left side of mass showed undifferentiated carcinoma, negative for TTF 1  Radiology:    CT scan of chest showed large left thyroid mass and multiple lung metastasis  PET-CT scan showed hypermetabolic large left thyroid mass invading to C7 and esophagus  Multiple hypermetabolic lung metastasis as well as multiple hypermetabolic osseous metastasis  I personally reviewed this films  Laboratory:    Creatinine 1 3  Otherwise unremarkable CMP  CBC is within normal limits  Physical Exam:      General Appearance:    Alert, oriented        Eyes:    PERRL   Ears:    Normal external ear canals, both ears   Nose:   Nares normal, septum midline   Throat:   Mucosa moist  Pharynx without injection  Neck:   Palpable left thyroid mass measuring 3-4 cm  Lungs:     Clear to auscultation bilaterally   Chest Wall:    No tenderness or deformity    Heart:    Regular rate and rhythm       Abdomen:     Soft, non-tender, bowel sounds +, no organomegaly           Extremities:   Extremities no cyanosis or edema       Skin:   no rash or icterus  Lymph nodes:   Cervical, supraclavicular, and axillary nodes normal   Neurologic:   CNII-XII intact, normal strength, sensation and reflexes     Throughout          Breast exam:   NA         ROS: Review of Systems   Constitutional:        Some weight loss   HENT:        Hoarseness  Dysphagia   All other systems reviewed and are negative  Imaging: Ct Soft Tissue Neck W Contrast    Result Date: 12/26/2018  Narrative: CT NECK WITH CONTRAST INDICATION:   R59 0: Localized enlarged lymph nodes E04 2: Nontoxic multinodular goiter  COMPARISON:  No previous CT imaging of the neck  Previous ultrasound imaging dated 12/24/2018  TECHNIQUE:  Axial, sagittal, and coronal 2D reformatted images were created from the axial source data and submitted for interpretation  Radiation dose length product (DLP) for this visit:  741 mGy-cm     This examination, like all CT scans performed in the [facility], was performed utilizing techniques to minimize radiation dose exposure, including the use of iterative reconstruction and automated exposure control  IV Contrast:  100 mL of iohexol (OMNIPAQUE) IMAGE QUALITY:  Diagnostic  FINDINGS: VISUALIZED BRAIN PARENCHYMA:  No acute intracranial pathology of the visualized brain parenchyma  VISUALIZED ORBITS AND PARANASAL SINUSES:  Normal  NASAL CAVITY AND NASOPHARYNX:  Normal  THYROID GLAND:  Heterogeneous mass identified within the left aspect of the thyroid gland  The mass extends posteriorly and is inseparable from a thickened upper esophagus  The thickened upper esophagus extends inferiorly below the level of the thyroid  mass into the upper mediastinum  The right lobe of the thyroid gland is heterogeneous as well and there is mild thickening of the isthmus  SUPRAHYOID NECK:  Normal oral cavity, tongue base, tonsillar fossa and epiglottis  INFRAHYOID NECK:  Normal aryepiglottic folds and piriform sinuses  Unremarkable glottis  The above described left-sided thyroid mass displaces the subglottic trachea towards the right and results in mild narrowing  PAROTID AND SUBMANDIBULAR GLANDS:  Normal  LYMPH NODES:  Slightly heterogeneously enhancing node within the left level 2 distribution, series 3 image 56 demonstrates slightly indistinct margins and measures 1 3 x 1 9 cm  Additional abnormal lymph nodes are noted demonstrating heterogeneous enhancement including a supraclavicular heterogeneously enhancing node measuring 1 7 x 1 3 cm  Abnormal nodes are primarily left-sided  There are multiple small right-sided nodes which are not enlarged by CT criteria  There are nodules identified within the thoracic inlet inseparable from the undersurface of the left thyroid mass suggesting abnormal nodes  VASCULAR STRUCTURES:  Mild displacement of the common carotid arteries by the thyroid mass described above    Normal enhancement of the vasculature without stenosis  THORACIC INLET:  There is a pleural-based mass within the left lateral apex measuring approximately 2 8 x 1 6 cm with adjacent interstitial thickening  Pulmonary nodule noted slightly more inferiorly within the left upper lobe on image 107 measures 6 mm  Ill-defined nodular density within the medial left upper lobe measures 9 mm in long axis  Additional pulmonary nodule noted within the left superior segment of the lower lobe on image 127 measuring 12 mm  Partially visualized nodule within the posterior right upper lobe on image 129   4 mm right upper lobe pulmonary nodule on image 113  See separate CT chest report  BONY STRUCTURES: Within the C7 vertebral body inferiorly there is a focal lucency with no sclerotic border  This is nonspecific and may represent atypical hemangioma  However, osseous metastatic lesion is not excluded  Impression: Diffusely heterogeneous thyroid gland significantly enlarged on the left  This mass is inseparable from the upper esophagus which is diffusely thickened  The thickened esophagus extends inferiorly into the upper mediastinum  Heterogeneously enhancing pathologic adenopathy is noted primarily within the left neck but also in the upper mediastinum immediately below the left thyroid mass  Multiple pulmonary nodules are noted within the upper lobes, left greater than right as well as a pleural-based mass identified within the left apex  See separate CT chest report  Findings suggest thyroid carcinoma with possible direct extension to the upper esophagus, and pulmonary metastasis  Patient recently underwent thyroid and lymph node biopsies 12/24/2018  The study was marked in [facility] for immediate notification  Workstation performed: [id number]     Ct Chest W Contrast    Result Date: 12/26/2018  Narrative: CT CHEST WITH IV CONTRAST INDICATION:   R59 0: Localized enlarged lymph nodes    81-year-old man with enlarged cervical lymph nodes COMPARISON:  None  TECHNIQUE: CT examination of the chest was performed  Axial, sagittal, and coronal 2D reformatted images were created from the source data and submitted for interpretation  Radiation dose length product (DLP) for this visit:  976 mGy-cm   This examination, like all CT scans performed in the [facility], was performed utilizing techniques to minimize radiation dose exposure, including the use of iterative reconstruction and automated exposure control  IV Contrast:  100 mL of iohexol (OMNIPAQUE) FINDINGS: LUNGS:  There are numerous bilateral pulmonary nodules suspicious for metastatic disease  0 6 cm left upper lobe pulmonary nodule (series 302 image 70)  0 7 cm right lower lobe pulmonary nodule small central area of cavitation (series 302 image 94)  1 1 x 1 0 cm nodule superior segment left lower lobe (series 302 image 50) The left upper lobe tree-in-bud opacities, which may be infectious or inflammatory, immediately subjacent to a pleural-based mass  There is no tracheal or endobronchial lesion  However, there is moderate narrowing of the superior trachea to 2 left thyroid mass with the luminal diameter measuring 1 4 x 0 6 cm  PLEURA:  3 4 x 0 9 cm left apical pleural-based mass (series 604 image 124)  HEART/GREAT VESSELS:  Unremarkable for patient's age  MEDIASTINUM AND ADRI:  1 1 cm left paratracheal lymph node (series 4 image 12)  CHEST WALL AND LOWER NECK:   Left thyroid mass is incompletely included  This was recently biopsied  VISUALIZED STRUCTURES IN THE UPPER ABDOMEN:  Mild hepatic steatosis  Left kidney upper pole cyst  OSSEOUS STRUCTURES:  No acute fracture or destructive osseous lesion  Impression: 1  Heterogeneous left thyroid mass, which was recently biopsied  2   Findings worrisome for pulmonary and likely left pleural metastatic disease  3   Left upper lobe tree-in-bud opacities adjacent to pleural-based mass may be infectious or inflammatory   4   Mildly enlarged left paratracheal lymph node may additionally be metastatic  I personally discussed this study with Dr Ace Alcaraz on 12/26/2018 at 11:03 AM  Workstation performed: [id number]     Nm Pet Ct Skull Base To Mid Thigh    Result Date: 1/8/2019  Narrative: WHOLE-BODY PET/CT SCAN INDICATION: C73: Malignant neoplasm of thyroid gland  , 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, initial staging for treatment management, PEG tube in place   MODIFIER: PI COMPARISON: CT 12/24/2018 and priors CELL TYPE:  Undifferentiated carcinoma, left thyroid biopsy 12/24/2018 TECHNIQUE: Following the intravenous administration of 8 3 mCi F-18-FDG, dedicated head and neck images were obtained from the skull vertex to the thoracic inlet with the patient's arms at their side  Subsequently, whole body images were obtained from the thoracic inlet through the proximal femurs with the patients arms above their head  Multiplanar attenuation corrected and non attenuation corrected PET images are available for interpretation  Contiguous, low dose, axial CT sections were also obtained from the head through the thoracic inlet and from the thoracic inlet through the proximal femurs  Intravenous contrast material was not utilized  Additional coronal and sagittal images are available as well as fused PET/CT images  This examination, like all CT scans performed in the [facility], was performed utilizing techniques to minimize radiation dose exposure, including the use of iterative reconstruction and automated exposure control  Fasting serum glucose: 125 mg/dl FINDINGS: VISUALIZED BRAIN: No acute abnormalities are seen  HEAD/NECK:   Large confluent thyroid mass demonstrates intense FDG activity, SUV 31 8, compatible with known malignancy  There is invasion into the soft tissues, as well as bilateral cervical adenopathy, and mass effect on the upper trachea    A hypermetabolic focus is present medial to the left mandibular ramus, SUV 23 3  Example right upper cervical node above the hyoid, series 3H image 47, measures 1 2 x 1 3 cm, SUV 33 7  Example left upper cervical node at the same level measures 1 4 x 1 2 cm, SUV 22 1  There  is likely direct invasion into the esophagus  There is also invasion into the posterior cervicothoracic junction, in particular the C7 level, with associated osseous destruction  Tumor also extends into the superior mediastinum  CT images:  As above  CHEST: Extension of the hypermetabolic thyroid mass into the superior mediastinum  Multiple bilateral hypermetabolic lung nodules compatible with metastases  Multiple left pleural-based metastatic lesions are also present  Example left apical pleural-based lesion series 3 image 34 measures approximately 1 8 x 3 6 cm, SUV 29  Nodule in the superior left lower lung image 53 measures 1 4 x 1 5 cm, SUV 13 5  Left perihilar lesion SUV 5 4  Left lung base posterior pleural-based lesion SUV 11 5  Left posterior medial probable pleural-based lesion at the level of the left 12th rib has an SUV of 15 4  Right upper posterior lung nodule image 55 measures 9 x 9 mm, SUV 2 9  Additional lesions are present  Left pericardial nodule/node image 80 measures 1 1 x 1 6 cm, SUV 20 9  CT images: Minimal coronary atherosclerosis  Small left effusion  ABDOMEN:   There is a hypermetabolic right liver lesion, SUV 14 3, compatible with a metastasis  This is difficult to visualize on the noncontrast CT images, but measures approximately 2 x 2 7 cm based on the extent of FDG activity  Lesion in the left lateral abdominal wall between the left lateral 8th and 9th ribs measures approximately 1 3 x 2 9 cm, SUV 39 4  Lesion deep to the left anterior 7th rib at the level of the diaphragm has an SUV of 18 5  CT images: Nonobstructing left renal calculi measuring up to 9 mm  Small renal cysts  Probable gallbladder sludge    PEG tube in place  PELVIS: Possible small hypermetabolic metastasis in the right gluteal musculature, SUV 2 9  CT images: Bladder wall thickening may be exacerbated by under distention  Bilateral hydroceles  OSSEOUS STRUCTURES:  Invasion by the primary thyroid mass into the cervicothoracic junction, in particular the C7 level, as above, with an SUV of 11 4  Multiple additional osseous metastases are present in the thoracolumbar spine, sacrum, left humerus, pelvis, left scapula, ribs  Example T9 lesion SUV 10 7  Right T12 lesion SUV 33 2  Left scapular lesion SUV 16 6  Proximal left humerus lesion SUV 24 3  Left posterior iliac lesion SUV 36  Small left sacral lesion SUV 4 7  CT images: Otherwise unremarkable  Impression: 1  Large hypermetabolic thyroid mass with invasion into the adjacent soft tissues, including esophagus, and cervicothoracic junction, in particular C7 level, with associated osseous destruction, compatible with known malignancy  Bilateral hypermetabolic cervical adenopathy  2   Multiple hypermetabolic lung nodules and pleural-based lesions compatible with metastases  3   Right liver hypermetabolic metastasis  Better determination of size of liver lesion may be obtained with contrast CT or MR  4   Multiple osseous metastases as above  Workstation performed: [id number]     Us Guided Lymph Node Biopsy Left    Result Date: 12/24/2018  Narrative: ULTRASOUND-GUIDED NECK LYMPH NODE BIOPSY HISTORY: 59year-old with history of recent ultrasound demonstrating left anterior jugular lymph node  Patient presents with prescription for biopsy of left anterior jugular lymph node  COMPARISON: Outside ultrasound December 12, 2018  FINDINGS: The procedure was explained to the patient, including risks of hemorrhage, infection and local injury  Informed consent was freely obtained  The patient verbalized expressed understanding of the above risks and wished to proceed with the procedure   Final standard "time-out" procedure performed  PROCEDURE: The neck was prepped and draped in normal sterile fashion  Under real-time ultrasound guidance and local anesthesia 6 passes with a 25 gauge needle were made through the left anterior jugular lymph node measuring today 2 4 x 1 3 x 1 4 cm  Cytopathology was present and deemed the specimens adequate for evaluation  The patient tolerated the procedure well  Postprocedure instructions were provided for the patient  I asked the patient to call us with any questions, concerns, or acute problems  The patient expressed understanding of the above  Impression: Status post successful ultrasound-guided biopsy of left anterior jugular chain lymph node  Procedure was performed by LOKI Skinner PA-C under the direct supervision of Dr Mirta Earl  Workstation performed: [id number]     Us Guided Thyroid Biopsy With Affirma    Result Date: 12/24/2018  Narrative: ULTRASOUND-GUIDED THYROID BIOPSY HISTORY: 59year-old with history of thyroid nodules, seen on a prior outside hospital ultrasound study  Patient presents with prescription for biopsy of left thyroid nodule(s) with Afrima testing  COMPARISON: Outside images December 12, 2018 FINDINGS: Prior ultrasound images were reviewed  The dominant left thyroid nodule was targeted for today's biopsy  In review of prior imaging from outside facilities as well as our recent imaging prior to procedure, the "2 separate nodules" described on the outside hospital ultrasound were favored to be part of a large dominant left thyroid lobe nodule  The procedure was explained to the patient, including risks of hemorrhage, infection and local injury  Informed consent was freely obtained  Final standard "time-out" procedure performed  PROCEDURE: The neck was prepped and draped in normal sterile fashion   Under real-time ultrasound guidance and local anesthesia 5 passes with a 25 gauge needle were made through the dominant left thyroid nodule measuring today 2 6 x 2 6 x 2 6 cm  Both areas of the nodule described as "separate nodules" on the outside hospital were sampled  Cytopathology was present and deemed the specimens adequate for evaluation  The patient tolerated the procedure well  Postprocedure instructions were provided for the patient  Impression: Status post successful ultrasound-guided thyroid biopsy  Procedure was performed by LOKI Ware PA-C under the direct supervision of Dr Дмитрий Milligan  Workstation performed: [id number]         Labs:   Lab Results   Component Value Date    WBC 7 72 12/29/2018    HGB 13 6 12/29/2018    HCT 38 5 12/29/2018    MCV 90 12/29/2018     12/29/2018     Lab Results   Component Value Date    K 3 9 12/30/2018    CL 98 (L) 12/30/2018    CO2 24 12/30/2018    BUN 14 12/30/2018    CREATININE 1 32 (H) 12/30/2018    CALCIUM 8 5 12/30/2018    AST 18 12/30/2018    ALT 13 12/30/2018    ALKPHOS 91 12/30/2018    EGFR 57 12/30/2018         Vital Sign:    Body surface area is 2 23 meters squared      Wt Readings from Last 3 Encounters:   01/10/19 108 kg (239 lb)   01/08/19 109 kg (240 lb)   12/31/18 114 kg (251 lb)        Temp Readings from Last 3 Encounters:   01/10/19 98 3 °F (36 8 °C) (Tympanic)   12/31/18 98 1 °F (36 7 °C) (Temporal)   12/30/18 (!) 97 4 °F (36 3 °C) (Temporal)        BP Readings from Last 3 Encounters:   01/10/19 140/94   12/31/18 132/84   12/30/18 168/79         Pulse Readings from Last 3 Encounters:   01/10/19 71   12/31/18 74   12/30/18 80     @LASTSAO2(3)@    Active Problems:   Patient Active Problem List   Diagnosis    Oropharyngeal dysphagia    Type 2 diabetes mellitus without complication, without long-term current use of insulin ([address] )    Essential hypertension    Morbid obesity due to excess calories (HCC)    Gastroesophageal reflux disease without esophagitis    Primary cancer of thyroid with metastasis to other site [facility])    Difficulty in swallowing       Past Medical History:   Past Medical History:   Diagnosis Date    Cancer ([facility] )     Diabetes mellitus ([facility] )     GERD (gastroesophageal reflux disease)     Hypertension        Surgical History:   Past Surgical History:   Procedure Laterality Date    COLONOSCOPY      ESOPHAGOGASTRODUODENOSCOPY N/A 12/28/2018    Procedure: ESOPHAGOGASTRODUODENOSCOPY (EGD) with PEG placement;  Surgeon: Lulu Harding MD;  Location: [facility]; Service: Gastroenterology    FRACTURE SURGERY Bilateral 2008    b/l legs    HERNIA REPAIR      LEG SURGERY Bilateral     US GUIDED LYMPH NODE BIOPSY LEFT  12/24/2018    US GUIDED THYROID BIOPSY  12/24/2018       Family History:    Family History   Problem Relation Age of Onset    Thyroid disease Mother     Prostate cancer Father        Cancer-related family history includes Prostate cancer in his father  Social History:   Social History     Social History    Marital status: /Civil Union     Spouse name: N/A    Number of children: N/A    Years of education: N/A     Occupational History    Not on file       Social History Main Topics    Smoking status: Former Smoker    Smokeless tobacco: Never Used      Comment: Former chewing tobacco use    Alcohol use No    Drug use: No    Sexual activity: Not on file     Other Topics Concern    Not on file     Social History Narrative    No narrative on file       Current Medications:   Current Outpatient Prescriptions   Medication Sig Dispense Refill    amLODIPine-benazepril (LOTREL 5-20) 5-20 MG per capsule Take 1 capsule by mouth 2 (two) times a day      aspirin (ECOTRIN LOW STRENGTH) 81 mg EC tablet Take 81 mg by mouth daily      cloNIDine (CATAPRES-TTS-3) 0 3 mg/24 hr Place 1 patch (0 3 mg total) on the skin once a week 4 patch 0    cyanocobalamin (VITAMIN B-12) 500 mcg tablet Take 500 mcg by mouth daily      insulin degludec (TRESIBA) 100 units/mL injection pen Inject under the skin daily      insulin glargine (LANTUS) 100 units/mL subcutaneous injection Inject 25 Units under the skin every morning 10 mL 0    metoprolol succinate (TOPROL-XL) 100 mg 24 hr tablet Take 100 mg by mouth 2 (two) times a day      omeprazole (PriLOSEC) 20 mg delayed release capsule Take 20 mg by mouth daily      repaglinide (PRANDIN) 2 mg tablet Take 1 tablet (2 mg total) by mouth 3 (three) times a day before meals 90 tablet 0    sitaGLIPtin (JANUVIA) 100 mg tablet Take 100 mg by mouth daily       No current facility-administered medications for this visit          Allergies: No Known Allergies

## 2019-01-10 NOTE — PROGRESS NOTES
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  He has large locally invading left thyroid tumor with multiple lung and bone metastasis  He presents today with his wife and son and daughter to discuss treatment option as well as prognosis  I had extensive discussion regarding metastatic anaplastic thyroid carcinoma  I told them that this is rare disease  Therefore, there is no large data regarding how to treat this type per cancer  Since his primary tumor is locally invading causing significant symptoms, radiation treatment for palliative intent is reasonable  I think it is most reasonable to add concurrent chemo therapy with low-dose doxorubicin 10mg/m2 weekly, once the radiation therapy started  After the concurrent chemoradiation, I will strongly consider doxorubicin combined with other chemotherapeutic drug  We also had long discussion regarding prognosis  I told him that this is treatable but not curable disease  Prognosis for metastatic anaplastic thyroid carcinoma is generally poor  His prognosis may be between 6-12 months  They understood  Since his tumor arise from midline structure, I would like to obtain AFP and HCG to rule out germ cell tumor which is highly unlikely with his age  He and his family are in agreement with my recommendations  Subjective:     HPI:  A 59-year-old gentleman who has hypertension and diabetes  In summer of 2018, he started to have voice change  In December 2019, he noticed left lower neck pulling sensation which she brought to medical attention  He underwent ultrasound which showed large thyroid mass  Around the same time, he started to have difficulty of swallowing  He was hospitalized and underwent pack to placement    He underwent biopsy of left thyroid mass which showed undifferentiated carcinoma negative for TTF 1   CT scan of chest showed multiple lung metastasis  He subsequently underwent PET-CT scan which showed large left thyroid mass invading to C7, esophagus  Multiple hypermetabolic lung metastasis as well as multiple osseous hypermetabolic metastasis were noted  He presents today with his wife and son and daughter to discuss treatment options  He was previously seen by Dr Marly Youssef who recommended palliative radiation therapy  He continued to have some hoarseness as well as difficulty of swallowing with solid food  He is able to drink liquids  He has not started using feeding tube for nutrition support  He has no complaint of pain  He has no cough, sputum production or shortness of breast   He has recent 15 lb weight loss  His performance status is 0 to 1/4 on the ECOG scale  Interval History:          Objective:     Primary Diagnosis:    Metastatic anaplastic thyroid carcinoma, diagnosed in January 2019  Cancer Staging:  Cancer Staging  No matching staging information was found for the patient  Previous Hematologic/ Oncologic Treatment:         Current Hematologic/ Oncologic Treatment:      Concurrent chemoradiation with low-dose doxorubicin to be started soon  Disease Status: To be determined  Test Results:    Pathology:    Fine-needle aspiration from left side of mass showed undifferentiated carcinoma, negative for TTF 1  Radiology:    CT scan of chest showed large left thyroid mass and multiple lung metastasis  PET-CT scan showed hypermetabolic large left thyroid mass invading to C7 and esophagus  Multiple hypermetabolic lung metastasis as well as multiple hypermetabolic osseous metastasis  I personally reviewed this films  Laboratory:    Creatinine 1 3  Otherwise unremarkable CMP  CBC is within normal limits      Physical Exam:      General Appearance:    Alert, oriented        Eyes:    PERRL Ears:    Normal external ear canals, both ears   Nose:   Nares normal, septum midline   Throat:   Mucosa moist  Pharynx without injection  Neck:   Palpable left thyroid mass measuring 3-4 cm  Lungs:     Clear to auscultation bilaterally   Chest Wall:    No tenderness or deformity    Heart:    Regular rate and rhythm       Abdomen:     Soft, non-tender, bowel sounds +, no organomegaly           Extremities:   Extremities no cyanosis or edema       Skin:   no rash or icterus  Lymph nodes:   Cervical, supraclavicular, and axillary nodes normal   Neurologic:   CNII-XII intact, normal strength, sensation and reflexes     Throughout          Breast exam:   NA         ROS: Review of Systems   Constitutional:        Some weight loss   HENT:        Hoarseness  Dysphagia   All other systems reviewed and are negative  Imaging: Ct Soft Tissue Neck W Contrast    Result Date: 12/26/2018  Narrative: CT NECK WITH CONTRAST INDICATION:   R59 0: Localized enlarged lymph nodes E04 2: Nontoxic multinodular goiter  COMPARISON:  No previous CT imaging of the neck  Previous ultrasound imaging dated 12/24/2018  TECHNIQUE:  Axial, sagittal, and coronal 2D reformatted images were created from the axial source data and submitted for interpretation  Radiation dose length product (DLP) for this visit:  741 mGy-cm   This examination, like all CT scans performed in the [facility], was performed utilizing techniques to minimize radiation dose exposure, including the use of iterative reconstruction and automated exposure control  IV Contrast:  100 mL of iohexol (OMNIPAQUE) IMAGE QUALITY:  Diagnostic  FINDINGS: VISUALIZED BRAIN PARENCHYMA:  No acute intracranial pathology of the visualized brain parenchyma  VISUALIZED ORBITS AND PARANASAL SINUSES:  Normal  NASAL CAVITY AND NASOPHARYNX:  Normal  THYROID GLAND:  Heterogeneous mass identified within the left aspect of the thyroid gland    The mass extends posteriorly and is inseparable from a thickened upper esophagus  The thickened upper esophagus extends inferiorly below the level of the thyroid  mass into the upper mediastinum  The right lobe of the thyroid gland is heterogeneous as well and there is mild thickening of the isthmus  SUPRAHYOID NECK:  Normal oral cavity, tongue base, tonsillar fossa and epiglottis  INFRAHYOID NECK:  Normal aryepiglottic folds and piriform sinuses  Unremarkable glottis  The above described left-sided thyroid mass displaces the subglottic trachea towards the right and results in mild narrowing  PAROTID AND SUBMANDIBULAR GLANDS:  Normal  LYMPH NODES:  Slightly heterogeneously enhancing node within the left level 2 distribution, series 3 image 56 demonstrates slightly indistinct margins and measures 1 3 x 1 9 cm  Additional abnormal lymph nodes are noted demonstrating heterogeneous enhancement including a supraclavicular heterogeneously enhancing node measuring 1 7 x 1 3 cm  Abnormal nodes are primarily left-sided  There are multiple small right-sided nodes which are not enlarged by CT criteria  There are nodules identified within the thoracic inlet inseparable from the undersurface of the left thyroid mass suggesting abnormal nodes  VASCULAR STRUCTURES:  Mild displacement of the common carotid arteries by the thyroid mass described above  Normal enhancement of the vasculature without stenosis  THORACIC INLET:  There is a pleural-based mass within the left lateral apex measuring approximately 2 8 x 1 6 cm with adjacent interstitial thickening  Pulmonary nodule noted slightly more inferiorly within the left upper lobe on image 107 measures 6 mm  Ill-defined nodular density within the medial left upper lobe measures 9 mm in long axis  Additional pulmonary nodule noted within the left superior segment of the lower lobe on image 127 measuring 12 mm  Partially visualized nodule within the posterior right upper lobe on image 129  4 mm right upper lobe pulmonary nodule on image 113  See separate CT chest report  BONY STRUCTURES: Within the C7 vertebral body inferiorly there is a focal lucency with no sclerotic border  This is nonspecific and may represent atypical hemangioma  However, osseous metastatic lesion is not excluded  Impression: Diffusely heterogeneous thyroid gland significantly enlarged on the left  This mass is inseparable from the upper esophagus which is diffusely thickened  The thickened esophagus extends inferiorly into the upper mediastinum  Heterogeneously enhancing pathologic adenopathy is noted primarily within the left neck but also in the upper mediastinum immediately below the left thyroid mass  Multiple pulmonary nodules are noted within the upper lobes, left greater than right as well as a pleural-based mass identified within the left apex  See separate CT chest report  Findings suggest thyroid carcinoma with possible direct extension to the upper esophagus, and pulmonary metastasis  Patient recently underwent thyroid and lymph node biopsies 12/24/2018  The study was marked in [facility] for immediate notification  Workstation performed: [id number]     Ct Chest W Contrast    Result Date: 12/26/2018  Narrative: CT CHEST WITH IV CONTRAST INDICATION:   R59 0: Localized enlarged lymph nodes  18-year-old man with enlarged cervical lymph nodes COMPARISON:  None  TECHNIQUE: CT examination of the chest was performed  Axial, sagittal, and coronal 2D reformatted images were created from the source data and submitted for interpretation  Radiation dose length product (DLP) for this visit:  976 mGy-cm   This examination, like all CT scans performed in the [facility], was performed utilizing techniques to minimize radiation dose exposure, including the use of iterative reconstruction and automated exposure control   IV Contrast:  100 mL of iohexol (OMNIPAQUE) FINDINGS: LUNGS:  There are numerous bilateral pulmonary nodules suspicious for metastatic disease  0 6 cm left upper lobe pulmonary nodule (series 302 image 70)  0 7 cm right lower lobe pulmonary nodule small central area of cavitation (series 302 image 94)  1 1 x 1 0 cm nodule superior segment left lower lobe (series 302 image 50) The left upper lobe tree-in-bud opacities, which may be infectious or inflammatory, immediately subjacent to a pleural-based mass  There is no tracheal or endobronchial lesion  However, there is moderate narrowing of the superior trachea to 2 left thyroid mass with the luminal diameter measuring 1 4 x 0 6 cm  PLEURA:  3 4 x 0 9 cm left apical pleural-based mass (series 604 image 124)  HEART/GREAT VESSELS:  Unremarkable for patient's age  MEDIASTINUM AND ADRI:  1 1 cm left paratracheal lymph node (series 4 image 12)  CHEST WALL AND LOWER NECK:   Left thyroid mass is incompletely included  This was recently biopsied  VISUALIZED STRUCTURES IN THE UPPER ABDOMEN:  Mild hepatic steatosis  Left kidney upper pole cyst  OSSEOUS STRUCTURES:  No acute fracture or destructive osseous lesion  Impression: 1  Heterogeneous left thyroid mass, which was recently biopsied  2   Findings worrisome for pulmonary and likely left pleural metastatic disease  3   Left upper lobe tree-in-bud opacities adjacent to pleural-based mass may be infectious or inflammatory  4   Mildly enlarged left paratracheal lymph node may additionally be metastatic  I personally discussed this study with Dr Kalyani Sherman on 12/26/2018 at 11:03 AM  Workstation performed: [id number]     Nm Pet Ct Skull Base To Mid Thigh    Result Date: 1/8/2019  Narrative: WHOLE-BODY PET/CT SCAN INDICATION: C73:  Malignant neoplasm of thyroid gland  , 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, initial staging for treatment management, PEG tube in place   MODIFIER: PI COMPARISON: CT 12/24/2018 and priors CELL TYPE:  Undifferentiated carcinoma, left thyroid biopsy 12/24/2018 TECHNIQUE: Following the intravenous administration of 8 3 mCi F-18-FDG, dedicated head and neck images were obtained from the skull vertex to the thoracic inlet with the patient's arms at their side  Subsequently, whole body images were obtained from the thoracic inlet through the proximal femurs with the patients arms above their head  Multiplanar attenuation corrected and non attenuation corrected PET images are available for interpretation  Contiguous, low dose, axial CT sections were also obtained from the head through the thoracic inlet and from the thoracic inlet through the proximal femurs  Intravenous contrast material was not utilized  Additional coronal and sagittal images are available as well as fused PET/CT images  This examination, like all CT scans performed in the [facility], was performed utilizing techniques to minimize radiation dose exposure, including the use of iterative reconstruction and automated exposure control  Fasting serum glucose: 125 mg/dl FINDINGS: VISUALIZED BRAIN: No acute abnormalities are seen  HEAD/NECK:   Large confluent thyroid mass demonstrates intense FDG activity, SUV 31 8, compatible with known malignancy  There is invasion into the soft tissues, as well as bilateral cervical adenopathy, and mass effect on the upper trachea  A hypermetabolic focus is present medial to the left mandibular ramus, SUV 23 3  Example right upper cervical node above the hyoid, series 3H image 47, measures 1 2 x 1 3 cm, SUV 33 7  Example left upper cervical node at the same level measures 1 4 x 1 2 cm, SUV 22 1  There  is likely direct invasion into the esophagus  There is also invasion into the posterior cervicothoracic junction, in particular the C7 level, with associated osseous destruction  Tumor also extends into the superior mediastinum  CT images:  As above   CHEST: Extension of the hypermetabolic thyroid mass into the superior mediastinum  Multiple bilateral hypermetabolic lung nodules compatible with metastases  Multiple left pleural-based metastatic lesions are also present  Example left apical pleural-based lesion series 3 image 34 measures approximately 1 8 x 3 6 cm, SUV 29  Nodule in the superior left lower lung image 53 measures 1 4 x 1 5 cm, SUV 13 5  Left perihilar lesion SUV 5 4  Left lung base posterior pleural-based lesion SUV 11 5  Left posterior medial probable pleural-based lesion at the level of the left 12th rib has an SUV of 15 4  Right upper posterior lung nodule image 55 measures 9 x 9 mm, SUV 2 9  Additional lesions are present  Left pericardial nodule/node image 80 measures 1 1 x 1 6 cm, SUV 20 9  CT images: Minimal coronary atherosclerosis  Small left effusion  ABDOMEN:   There is a hypermetabolic right liver lesion, SUV 14 3, compatible with a metastasis  This is difficult to visualize on the noncontrast CT images, but measures approximately 2 x 2 7 cm based on the extent of FDG activity  Lesion in the left lateral abdominal wall between the left lateral 8th and 9th ribs measures approximately 1 3 x 2 9 cm, SUV 39 4  Lesion deep to the left anterior 7th rib at the level of the diaphragm has an SUV of 18 5  CT images: Nonobstructing left renal calculi measuring up to 9 mm  Small renal cysts  Probable gallbladder sludge  PEG tube in place  PELVIS: Possible small hypermetabolic metastasis in the right gluteal musculature, SUV 2 9  CT images: Bladder wall thickening may be exacerbated by under distention  Bilateral hydroceles  OSSEOUS STRUCTURES:  Invasion by the primary thyroid mass into the cervicothoracic junction, in particular the C7 level, as above, with an SUV of 11 4  Multiple additional osseous metastases are present in the thoracolumbar spine, sacrum, left humerus, pelvis, left scapula, ribs  Example T9 lesion SUV 10 7  Right T12 lesion SUV 33 2  Left scapular lesion SUV 16 6   Proximal left humerus lesion SUV 24 3  Left posterior iliac lesion SUV 36  Small left sacral lesion SUV 4 7  CT images: Otherwise unremarkable  Impression: 1  Large hypermetabolic thyroid mass with invasion into the adjacent soft tissues, including esophagus, and cervicothoracic junction, in particular C7 level, with associated osseous destruction, compatible with known malignancy  Bilateral hypermetabolic cervical adenopathy  2   Multiple hypermetabolic lung nodules and pleural-based lesions compatible with metastases  3   Right liver hypermetabolic metastasis  Better determination of size of liver lesion may be obtained with contrast CT or MR  4   Multiple osseous metastases as above  Workstation performed: [id number]     Us Guided Lymph Node Biopsy Left    Result Date: 12/24/2018  Narrative: ULTRASOUND-GUIDED NECK LYMPH NODE BIOPSY HISTORY: 59year-old with history of recent ultrasound demonstrating left anterior jugular lymph node  Patient presents with prescription for biopsy of left anterior jugular lymph node  COMPARISON: Outside ultrasound December 12, 2018  FINDINGS: The procedure was explained to the patient, including risks of hemorrhage, infection and local injury  Informed consent was freely obtained  The patient verbalized expressed understanding of the above risks and wished to proceed with the procedure  Final standard "time-out" procedure performed  PROCEDURE: The neck was prepped and draped in normal sterile fashion  Under real-time ultrasound guidance and local anesthesia 6 passes with a 25 gauge needle were made through the left anterior jugular lymph node measuring today 2 4 x 1 3 x 1 4 cm  Cytopathology was present and deemed the specimens adequate for evaluation  The patient tolerated the procedure well  Postprocedure instructions were provided for the patient  I asked the patient to call us with any questions, concerns, or acute problems  The patient expressed understanding of the above  Impression: Status post successful ultrasound-guided biopsy of left anterior jugular chain lymph node  Procedure was performed by LOKI Beth PA-C under the direct supervision of Dr Angel Frederick  Workstation performed: [id number]     Us Guided Thyroid Biopsy With Affirma    Result Date: 12/24/2018  Narrative: ULTRASOUND-GUIDED THYROID BIOPSY HISTORY: 59year-old with history of thyroid nodules, seen on a prior outside hospital ultrasound study  Patient presents with prescription for biopsy of left thyroid nodule(s) with Afrima testing  COMPARISON: Outside images December 12, 2018 FINDINGS: Prior ultrasound images were reviewed  The dominant left thyroid nodule was targeted for today's biopsy  In review of prior imaging from outside facilities as well as our recent imaging prior to procedure, the "2 separate nodules" described on the outside hospital ultrasound were favored to be part of a large dominant left thyroid lobe nodule  The procedure was explained to the patient, including risks of hemorrhage, infection and local injury  Informed consent was freely obtained  Final standard "time-out" procedure performed  PROCEDURE: The neck was prepped and draped in normal sterile fashion  Under real-time ultrasound guidance and local anesthesia 5 passes with a 25 gauge needle were made through the dominant left thyroid nodule measuring today 2 6 x 2 6 x 2 6 cm  Both areas of the nodule described as "separate nodules" on the outside hospital were sampled  Cytopathology was present and deemed the specimens adequate for evaluation  The patient tolerated the procedure well  Postprocedure instructions were provided for the patient  Impression: Status post successful ultrasound-guided thyroid biopsy  Procedure was performed by LOKI Beth PA-C under the direct supervision of Dr Angel Frederick   Workstation performed: [id number]         Labs:   Lab Results   Component Value Date    WBC 7 72 12/29/2018    HGB 13 6 12/29/2018    HCT 38 5 12/29/2018    MCV 90 12/29/2018     12/29/2018     Lab Results   Component Value Date    K 3 9 12/30/2018    CL 98 (L) 12/30/2018    CO2 24 12/30/2018    BUN 14 12/30/2018    CREATININE 1 32 (H) 12/30/2018    CALCIUM 8 5 12/30/2018    AST 18 12/30/2018    ALT 13 12/30/2018    ALKPHOS 91 12/30/2018    EGFR 57 12/30/2018         Vital Sign:    Body surface area is 2 23 meters squared  Wt Readings from Last 3 Encounters:   01/10/19 108 kg (239 lb)   01/08/19 109 kg (240 lb)   12/31/18 114 kg (251 lb)        Temp Readings from Last 3 Encounters:   01/10/19 98 3 °F (36 8 °C) (Tympanic)   12/31/18 98 1 °F (36 7 °C) (Temporal)   12/30/18 (!) 97 4 °F (36 3 °C) (Temporal)        BP Readings from Last 3 Encounters:   01/10/19 140/94   12/31/18 132/84   12/30/18 168/79         Pulse Readings from Last 3 Encounters:   01/10/19 71   12/31/18 74   12/30/18 80     @LASTSAO2(3)@    Active Problems:   Patient Active Problem List   Diagnosis    Oropharyngeal dysphagia    Type 2 diabetes mellitus without complication, without long-term current use of insulin ([facility] [address] )    Essential hypertension    Morbid obesity due to excess calories (HCC)    Gastroesophageal reflux disease without esophagitis    Primary cancer of thyroid with metastasis to other site [facility])    Difficulty in swallowing       Past Medical History:   Past Medical History:   Diagnosis Date    Cancer ([facility] [address] )     Diabetes mellitus ([facility] [address] )     GERD (gastroesophageal reflux disease)     Hypertension        Surgical History:   Past Surgical History:   Procedure Laterality Date    COLONOSCOPY      ESOPHAGOGASTRODUODENOSCOPY N/A 12/28/2018    Procedure: ESOPHAGOGASTRODUODENOSCOPY (EGD) with PEG placement;  Surgeon: Heidy Medina MD;  Location: [facility];   Service: Gastroenterology    FRACTURE SURGERY Bilateral 2008    b/l legs    HERNIA REPAIR      LEG SURGERY Bilateral     US GUIDED LYMPH NODE BIOPSY LEFT  12/24/2018    US GUIDED THYROID BIOPSY  12/24/2018       Family History:    Family History   Problem Relation Age of Onset    Thyroid disease Mother     Prostate cancer Father        Cancer-related family history includes Prostate cancer in his father  Social History:   Social History     Social History    Marital status: /Civil Union     Spouse name: N/A    Number of children: N/A    Years of education: N/A     Occupational History    Not on file  Social History Main Topics    Smoking status: Former Smoker    Smokeless tobacco: Never Used      Comment: Former chewing tobacco use    Alcohol use No    Drug use: No    Sexual activity: Not on file     Other Topics Concern    Not on file     Social History Narrative    No narrative on file       Current Medications:   Current Outpatient Prescriptions   Medication Sig Dispense Refill    amLODIPine-benazepril (LOTREL 5-20) 5-20 MG per capsule Take 1 capsule by mouth 2 (two) times a day      aspirin (ECOTRIN LOW STRENGTH) 81 mg EC tablet Take 81 mg by mouth daily      cloNIDine (CATAPRES-TTS-3) 0 3 mg/24 hr Place 1 patch (0 3 mg total) on the skin once a week 4 patch 0    cyanocobalamin (VITAMIN B-12) 500 mcg tablet Take 500 mcg by mouth daily      insulin degludec (TRESIBA) 100 units/mL injection pen Inject under the skin daily      insulin glargine (LANTUS) 100 units/mL subcutaneous injection Inject 25 Units under the skin every morning 10 mL 0    metoprolol succinate (TOPROL-XL) 100 mg 24 hr tablet Take 100 mg by mouth 2 (two) times a day      omeprazole (PriLOSEC) 20 mg delayed release capsule Take 20 mg by mouth daily      repaglinide (PRANDIN) 2 mg tablet Take 1 tablet (2 mg total) by mouth 3 (three) times a day before meals 90 tablet 0    sitaGLIPtin (JANUVIA) 100 mg tablet Take 100 mg by mouth daily       No current facility-administered medications for this visit          Allergies: No Known Allergies

## 2019-01-17 NOTE — BRIEF OP NOTE (RAD/CATH)
IR PORT PLACEMENT  Procedure Note    PATIENT NAME: Jimbo Ferro  : 1955  MRN: [id number]     Pre-op Diagnosis:   1  Primary cancer of thyroid with metastasis to other site [facility])      Post-op Diagnosis:   1  Primary cancer of thyroid with metastasis to other site [facility])        Surgeon:   Khloe Siddiqui MD    Estimated Blood Loss: 2 cc    Findings: Successful placement of right chest port through right IJV access      Specimens: None    Complications:  None    Anesthesia: Conscious sedation and Local    Khloe Siddiqui MD     Date: 2019  Time: 11:25 AM

## 2019-01-17 NOTE — DISCHARGE INSTRUCTIONS
Implanted Venous Access Port     WHAT YOU NEED TO KNOW:   An implanted venous access port is a device used to give treatments and take blood  It may also be called a central venous access device (CVAD)  The port is a small container that is placed under your skin, usually in your upper chest  The port is attached to a catheter that enters a large vein  DISCHARGE INSTRUCTIONS:   Resume your normal diet  Small sips of flat soda will help with mild nausea  Prevent an infection:   · Wash your hands often  Use soap and water  Clean your hands before and after you care for your port  Remind everyone who cares for your port to wash their hands  · Check your skin for infection every day  Look for redness, swelling, or fluid oozing from the port site  Care for your port:   1  You may shower beginning 48 hours after procedure  2  Change dressing if it becomes wet  3  Remove dressing after 24 hours  Leave glue in place  4  It is normal for some bruising to occur  5  Use Tylenol for pain  6  Limit use of arm on the side that your port was placed  Lift nothing heavier than 5 pounds for 1 week, and then gradually increase activity as tolerated  7  DO NOT apply ointment, lotion or cream to port site until incision is healed  Allow glue to fall off  DO NOT attempt to peel glue from skin even it it begins to flake  8, After the port incision is healed you may swim, bathe  Notify the Interventional Radiologist if you have any of the followin  Fever above 101 F    2  Increased redness or swelling after 1st day  3  Increased pain after 1st day  4  Any sign of infection (drainage from port site, skin separation, hot to touch)  5  Persistent nausea or vomiting  Contact Interventional Radiology at [phone] [facility] PATIENTS: Contact Interventional Radiology at [phone]) ([address]: Contact Interventional Radiology at [phone])

## 2019-01-17 NOTE — H&P
IR H&P    HPI:  59year old male with newly diagnosed metastatic anaplastic thyroid carcinoma will be receiving chemotherapy and presents for port placement  PMH:  Metastatic anaplastic thyroid cancer  DM  GERD  HTN    PSH:  Hernia repair  Bilateral leg surgery    Physical exam:  Gen: NAD    Coags ok    A/P:  59year old male with newly diagnosed metastatic anaplastic thyroid carcinoma  - Port placement

## 2019-01-18 PROBLEM — C73 THYROID CANCER (HCC): Status: ACTIVE | Noted: 2019-01-01

## 2019-01-18 PROBLEM — R79.89 ELEVATED TSH: Status: ACTIVE | Noted: 2019-01-01

## 2019-01-18 PROBLEM — N18.30 CKD (CHRONIC KIDNEY DISEASE) STAGE 3, GFR 30-59 ML/MIN (HCC): Chronic | Status: ACTIVE | Noted: 2019-01-01

## 2019-01-18 PROBLEM — J98.8 AIRWAY OBSTRUCTION: Status: ACTIVE | Noted: 2019-01-01

## 2019-01-18 PROBLEM — J39.8 TRACHEAL DEVIATION: Status: ACTIVE | Noted: 2019-01-01

## 2019-01-18 PROBLEM — I10 ACCELERATED HYPERTENSION: Status: ACTIVE | Noted: 2019-01-01

## 2019-01-18 NOTE — ASSESSMENT & PLAN NOTE
· Creatinine 1 3, at baseline  · Avoid NSAIDs and nephrotoxins  · Monitor BMP  · Outpatient follow-up with Nephrology

## 2019-01-18 NOTE — ED NOTES
[facility] [address]  01/17/19 9357

## 2019-01-18 NOTE — ASSESSMENT & PLAN NOTE
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

## 2019-01-18 NOTE — CONSULTS
Consultation - ENT   Juan José Flanagan 59 y o  male MRN: [id number]  Unit/Bed#: ICU 06 Encounter: [id number]      Assessment/Plan     Assessment:  1  Metastatic anaplastic thyroid carcinoma  2  Left vocal cord paralysis and right vocal cord paresis  3  Upper airway obstruction  Plan:  After thorough discussion with Chris Montaño at the bedside we have decided to proceed with a tracheostomy today in the operating room to secure his airway  He is aware of the aggressive nature of his tumor and despite the fact that he is scheduled to have radiation treatment in the near future is quite possible that he could have further airway compromise throughout the weekend which is not an ideal situation  He is fearful of losing his airway and would like to have the tracheostomy placed  We did a thorough discussion of the significant risks involved in this since I will mass likely be going through tumor in order to place the tracheostomy  Risks that were discussed at the bedside with Dr Geraldine Keen present include but were not limited to:  Bleeding, infection, airway obstruction and death, pneumothorax, local tumor spread with obstruction of the airway, delayed airway obstruction secondary to tumor growth  He understands and is willing to accept all the risks associated with the surgery  This will be done later this morning  Consent was signed and charted  History of Present Illness   Physician Requesting Consult: Amisha Banks DO  Reason for Consult / Principal Problem:  Difficulty breathing and shortness of breath  HPI: Juan José Flanagan is a 59y o  year old male who presents with difficulty breathing and shortness of breath  He was diagnosed with metastatic anaplastic thyroid carcinoma approximately 2-3 weeks ago  Despite working on this very quickly and arranging for him to have radiation he is still not yet begun the therapy    Unfortunately last night he had some difficulty breathing and presented to the ER where he was evaluated and taken to the ICU for airway monitoring  We were called for evaluation and stated that he should be left NPO and with supplemental oxygen  He was also given IV steroids which has helped over the last few hours  At the bedside now he is not acutely short of breath but it is certainly not at his baseline  Consults    Review of Systems   Constitutional: Negative  HENT: Positive for sore throat, trouble swallowing (Has been unable to tolerate much other than liquids) and voice change ( slight hoarseness)  Respiratory: Positive for shortness of breath  All other systems reviewed and are negative  Historical Information   Past Medical History:   Diagnosis Date    Cancer ([facility] )     Diabetes mellitus ([facility] )     GERD (gastroesophageal reflux disease)     Hypertension      Past Surgical History:   Procedure Laterality Date    COLONOSCOPY      ESOPHAGOGASTRODUODENOSCOPY N/A 12/28/2018    Procedure: ESOPHAGOGASTRODUODENOSCOPY (EGD) with PEG placement;  Surgeon: Kiara Gallagher MD;  Location: [facility];   Service: Gastroenterology    FRACTURE SURGERY Bilateral 2008    b/l legs    HERNIA REPAIR      IR PORT PLACEMENT  1/17/2019    LEG SURGERY Bilateral     US GUIDED LYMPH NODE BIOPSY LEFT  12/24/2018    US GUIDED THYROID BIOPSY  12/24/2018     Social History   History   Alcohol Use No     History   Drug Use No     History   Smoking Status    Former Smoker   Smokeless Tobacco    Never Used     Comment: Former chewing tobacco use     Family History:   Family History   Problem Relation Age of Onset    Thyroid disease Mother     Prostate cancer Father        Meds/Allergies   current meds:   Current Facility-Administered Medications   Medication Dose Route Frequency    acetaminophen (TYLENOL) oral suspension 650 mg  650 mg Oral Q4H PRN    lisinopril (ZESTRIL) tablet 20 mg  20 mg Oral Daily    And    amLODIPine (NORVASC) tablet 5 mg  5 mg Oral Daily    [START ON 1/20/2019] cloNIDine (CATAPRES-TTS-3) 0 3 mg/24 hr TD weekly patch  1 patch Transdermal Weekly    cyanocobalamin (VITAMIN B-12) tablet 500 mcg  500 mcg Per PEG Tube Daily    heparin (porcine) subcutaneous injection 5,000 Units  5,000 Units Subcutaneous Q8H [address]    insulin lispro (HumaLOG) 100 units/mL subcutaneous injection 1-5 Units  1-5 Units Subcutaneous HS    insulin lispro (HumaLOG) 100 units/mL subcutaneous injection 1-6 Units  1-6 Units Subcutaneous TID AC    methylPREDNISolone sodium succinate (Solu-MEDROL) injection 40 mg  40 mg Intravenous Q8H    metoprolol tartrate (LOPRESSOR) tablet 100 mg  100 mg Per PEG Tube Q12H [address]    ondansetron (ZOFRAN) injection 4 mg  4 mg Intravenous Q6H PRN    pantoprazole (PROTONIX) injection 40 mg  40 mg Intravenous Q24H CIARA    sodium chloride 0 9 % infusion  80 mL/hr Intravenous Continuous    and PTA meds:   Prior to Admission Medications   Prescriptions Last Dose Informant Patient Reported? Taking?    amLODIPine-benazepril (LOTREL 5-20) 5-20 MG per capsule   Yes Yes   Sig: Take 1 capsule by mouth 2 (two) times a day   aspirin (ECOTRIN LOW STRENGTH) 81 mg EC tablet   Yes Yes   Sig: Take 81 mg by mouth daily   cloNIDine (CATAPRES-TTS-3) 0 3 mg/24 hr   No Yes   Sig: Place 1 patch (0 3 mg total) on the skin once a week   cyanocobalamin (VITAMIN B-12) 500 mcg tablet   Yes Yes   Sig: Take 500 mcg by mouth daily   insulin degludec (TRESIBA) 100 units/mL injection pen   Yes Yes   Sig: Inject under the skin daily   insulin glargine (LANTUS) 100 units/mL subcutaneous injection   No Yes   Sig: Inject 25 Units under the skin every morning   metoprolol succinate (TOPROL-XL) 100 mg 24 hr tablet   Yes Yes   Sig: Take 100 mg by mouth 2 (two) times a day   omeprazole (PriLOSEC) 20 mg delayed release capsule   Yes Yes   Sig: Take 20 mg by mouth daily   ondansetron (ZOFRAN-ODT) 4 mg disintegrating tablet   No Yes   Sig: Take 1 tablet (4 mg total) by mouth every 6 (six) hours as needed for nausea or vomiting repaglinide (PRANDIN) 2 mg tablet   No Yes   Sig: Take 1 tablet (2 mg total) by mouth 3 (three) times a day before meals   sitaGLIPtin (JANUVIA) 100 mg tablet   Yes Yes   Sig: Take 100 mg by mouth daily      Facility-Administered Medications: None       No Known Allergies    Objective       Intake/Output Summary (Last 24 hours) at 01/18/19 0905  Last data filed at 01/18/19 0400   Gross per 24 hour   Intake          1230 67 ml   Output                0 ml   Net          1230 67 ml       Invasive Devices     Peripheral Intravenous Line            Peripheral IV 01/17/19 Left Antecubital less than 1 day          Drain            Gastrostomy/Enterostomy Percutaneous endoscopic gastrostomy (PEG) 20 Fr  LUQ 20 days                Physical Exam   Constitutional: He appears well-developed and well-nourished  No distress  HENT:   Head: Normocephalic and atraumatic  Right Ear: External ear normal    Left Ear: External ear normal    Nose: Nose normal    Mouth/Throat: Oropharynx is clear and moist  No oropharyngeal exudate  Eyes: Pupils are equal, round, and reactive to light  Conjunctivae are normal  Right eye exhibits no discharge  Left eye exhibits no discharge  No scleral icterus  Neck:   Tracheal deviation to the left side  Large thyroid mass right side which partially overlap the trachea  Remainder of neck without palpable adenopathy   Skin: He is not diaphoretic  Flexible fiberoptic endoscopy was completed through the right nasal cavity  Findings are as follows:  Nasopharynx:  No masses, adenoid pad, mucopus  Pink moist mucosa  Oropharynx:  No masses, mucopus, lesions  Posterior pharyngeal wall:  Pink, moist mucosa  No lesions  Tongue base:  Pink and moist   Normal papillae  No mucopus or tonsilliths noted    Epiglottis:  Normal shape, pink and moist, no edema of laryneal or pharyngeal surfaces  Vallecula:  No lesions or pooling of secretions  Aryarytenoid folds:  Normal in size and shape, no edema, smooth pink mucosa  Arytenoids:  Pink moist mucosa  No edema, erythema, lesions  False vocal cords:  Pink moist mucosa with edema noted bilaterally   True vocal cords:  Left vocal cord in the paramedian position and mobile  Right vocal cord with paresis  Subglottic area:  No evidence of lesion or obstruction  The scope was removed from the nasal cavity after complete evaluation of the above structures - patient tolerated the procedure well  Lab Results:   CBC:   Lab Results   Component Value Date    WBC 8 24 01/18/2019    HGB 12 7 01/18/2019    HCT 36 9 01/18/2019    MCV 90 01/18/2019     01/18/2019    MCH 30 8 01/18/2019    MCHC 34 4 01/18/2019    RDW 12 6 01/18/2019    MPV 8 8 (L) 01/18/2019    NRBC 0 01/18/2019   , CMP:   Lab Results   Component Value Date    SODIUM 136 01/18/2019    K 4 1 01/18/2019    CL 97 (L) 01/18/2019    CO2 24 01/18/2019    BUN 19 01/18/2019    CREATININE 1 24 01/18/2019    CALCIUM 9 1 01/18/2019    AST 16 01/17/2019    ALT 15 01/17/2019    ALKPHOS 86 01/17/2019    EGFR 62 01/18/2019   , Coags: No results found for: PT, PTT, INR  Imaging Studies: I have personally reviewed pertinent films in PACS  EKG, Pathology, and Other Studies: I have personally reviewed pertinent reports        Code Status: Level 1 - Full Code  Advance Directive and Living Will:      Power of :    POLST:      None

## 2019-01-18 NOTE — ANESTHESIA PREPROCEDURE EVALUATION
Review of Systems/Medical History  Patient summary reviewed  Chart reviewed      Cardiovascular  Hypertension ,    Pulmonary  Smoker ex-smoker  Cumulative Pack Years: 21,   Comment: Denies shortness of breath even when supine    FINDINGS:     LUNGS:    There are numerous bilateral pulmonary nodules suspicious for metastatic disease  0 6 cm left upper lobe pulmonary nodule (series 302 image 70)  0 7 cm right lower lobe pulmonary nodule small central area of cavitation (series 302 image 94)  1 1 x 1 0 cm nodule superior segment left lower lobe (series 302 image 50)  The left upper lobe tree-in-bud opacities, which may be infectious or inflammatory, immediately subjacent to a pleural-based mass  GI/Hepatic  Dysphagia, Dysphagia type: liquids and solids  GERD well controlled,             Endo/Other  Diabetes poorly controlled type 2 Oral agent, History of thyroid disease ,   Comment: Thyroid cancer Obesity    GYN       Hematology  Negative hematology ROS      Musculoskeletal  Negative musculoskeletal ROS        Neurology  Negative neurology ROS      Psychology   Negative psychology ROS              Physical Exam    Airway  Comment: Fixed, non-movable airway mass  Mallampati score: III  TM Distance: >3 FB  Neck ROM: full     Dental   No notable dental hx     Cardiovascular  Rhythm: regular, Rate: normal, Cardiovascular exam normal    Pulmonary  Pulmonary exam normal Breath sounds clear to auscultation,     Other Findings        Anesthesia Plan  ASA Score- 3     Anesthesia Type- general with ASA Monitors  Additional Monitors:   Airway Plan: ETT  Plan Factors-    Induction- intravenous  Postoperative Plan-     Informed Consent- Anesthetic plan and risks discussed with patient

## 2019-01-18 NOTE — PROGRESS NOTES
Patient returned from the OR, pt intubated at this time, on propofol gtt and IV fluids  Dr Gail Nolasco and Dr Marlena Springer are speaking with patient's wife about plan for care

## 2019-01-18 NOTE — DISCHARGE SUMMARY
Discharge Summary   Sisi Alarcon 59 y o  male MRN: [id number]  Unit/Bed#: ICU 06 Encounter: [id number]    Admission Date: 1/17/2019     Discharge Date: 1/18/19    Admitting Diagnoses:   1  Acute respiratory failure secondary to metastatic thyroid cancer  2  Hypertension  3  Chronic kidney disease    Discharge Diagnoses:  1  Failed attempt at tracheostomy secondary to profound tumor burden  2  Metastatic thyroid cancer  3  Hypertension  4  Chronic kidney disease  5  Vent dependent respiratory failure      Procedures Performed:   Attempt at surgical placement of tracheostomy    Hospital Course:   Patient has a known history of metastatic thyroid disease  He was seen last week in Dr Oslen [facility] office  Patient began to experience shortness of breath and a feeling of tightness in his throat  He has wheezing and felt that he could not catch his breath  He was admitted to the step-down portion of the ICU under turn medicine service on 17 January 2019  He was seen by Dr Marty Dawson on the morning of 18 January 2019 when it was decided the patient would be taken to the OR for tracheostomy  Patient returned to the ICU, intubated, without a tracheostomy after finding that the patient's tumor burden was excessive and they were unable to place a trach  It was then decided to transfer the patient to [place] to undergo stat radiation to hopefully reduce the size of the tumor and therefore placed tracheostomy  The patient was transferred to critical care service secondary to being intubated  Transfer was finally arranged at 18:40 on 18 January 2019  Significant Findings, Care, Treatment and Services Provided:   See above    Complications:   See above    Condition at Discharge: fair     Discharge instructions/Information to patient and family:   See after visit summary for information provided to patient and family        Provisions for Follow-Up Care:  See after visit summary for information related to follow-up care and any pertinent home health orders  Disposition: See After Visit Summary for discharge disposition information  Discharge Statement   I spent 20 minutes discharging the patient  This time was spent on the day of discharge  I had direct contact with the patient on the day of discharge  Additional documentation is required if more than 30 minutes were spent on discharge  Discharge Medications:  See after visit summary for reconciled discharge medications provided to patient and family

## 2019-01-18 NOTE — PROGRESS NOTES
Accept note - Critical Care   Nicole Ho 59 y o  male MRN: [id number]  Unit/Bed#: ICU 06 Encounter: [id number]    Assessment/Plan:   1  Metastatic thyroid cancer  · Transfer to [facility] for stat radiation treatment      Critical Care Time:   Documented critical care time excludes any procedures documented elsewhere  It also excludes any family updates    _____________________________________________________________________    HPI/24hr events:   Patient was admitted to the step-down portion of the ICU at [facility] by internal medicine  He was taken to the operating room today by Dr Ysabel Alvarado of ENT for tracheostomy  Unfortunately the tumor burden was to profound and Dr Suki Vance was unable to perform tracheostomy  The patient was returned to the intensive care unit intubated on mechanical ventilation  He is being transferred to [facility] for stat radiation therapy to hopefully reduce the tumor burden  He will then be re-evaluated for tracheostomy      Medications:    Current Facility-Administered Medications:  acetaminophen 650 mg Oral Q4H PRN JETHRO Rosenberg    lisinopril 20 mg Oral Daily JETHRO Rosenberg    And        amLODIPine 5 mg Oral Daily JETHRO Rosenberg    chlorhexidine 15 mL Swish & Spit Q12H [address] [place], [address]    [START ON 1/20/2019] cloNIDine 1 patch Transdermal Weekly JETHRO Rosenberg    cyanocobalamin 500 mcg Per PEG Tube Daily JETHRO Rosenberg    fentaNYL 25 mcg Intravenous Q5 Min PRN Gisella Norman MD    heparin (porcine) 5,000 Units Subcutaneous [facility] JETHRO Rosenberg    insulin lispro 1-5 Units Subcutaneous HS JETHRO Rosenberg    insulin lispro 1-6 Units Subcutaneous TID AC JETHRO Rosenberg    methylPREDNISolone sodium succinate 40 mg Intravenous Q8H JETHRO Rosenberg    metoprolol tartrate 100 mg Per PEG Tube Q12H [address] JETHRO Rosenberg    ondansetron 4 mg Intravenous Q6H PRN Gurney Cables, CRNP    ondansetron 4 mg Intravenous Once PRN Jan Kilpatrick MD    pantoprazole 40 mg Intravenous Q24H [facility] Gurney Cables, CRNP    sodium chloride 80 mL/hr Intravenous Continuous Gurney Cables, CRNP Last Rate: 80 mL/hr (01/18/19 0152)         sodium chloride 80 mL/hr Last Rate: 80 mL/hr (01/18/19 0152)         Physical exam:  Vitals: Body mass index is 33 5 kg/m²  Blood pressure 159/88, pulse 66, temperature (!) 97 3 °F (36 3 °C), temperature source Temporal, resp  rate 14, height 5' 10" (1 778 m), weight 106 kg (233 lb 7 5 oz), SpO2 99 %  ,  Temp  Min: 97 1 °F (36 2 °C)  Max: 98 6 °F (37 °C)  IBW: 73 kg    SpO2: 99 %  SpO2 Activity: At Rest  O2 Device: Other (comment) (ac14/450/1 0/+5)      Intake/Output Summary (Last 24 hours) at 01/18/19 1348  Last data filed at 01/18/19 1249   Gross per 24 hour   Intake          3050 67 ml   Output               25 ml   Net          3025 67 ml       Invasive/non-invasive ventilation settings:   Respiratory    Lab Data (Last 4 hours)    None         O2/Vent Data (Last 4 hours)      01/18 1300           Vent Mode AC/VC       Resp Rate (BPM) (BPM) 14       Vt (mL) (mL) 500       FIO2 (%) (%) 80       PEEP (cmH2O) (cmH2O) 5       MV 7                 Invasive Devices     Peripheral Intravenous Line            Peripheral IV 01/17/19 Left Antecubital less than 1 day    Peripheral IV 01/18/19 Left Hand less than 1 day          Drain            Gastrostomy/Enterostomy Percutaneous endoscopic gastrostomy (PEG) 20 Fr  LUQ 20 days    NG/OG/Enteral Tube Orogastric 18 Fr Right mouth less than 1 day          Airway            ETT  Cuffed;Oral;Straight; Inflated 8 mm less than 1 day                  Physical Exam:  Gen:  Intubated and sedated  HEENT:  Atraumatic normocephalic pupils equal round reactive to light extraocular muscles intact sclerae anicteric oral pharynx is intubated  Neck:  Supple no JVD    Incision is clean dry and intact and approximated with staples  Chest:  Respirations per the vent breath sounds are coarse bilaterally  Cor:  Regular rate and rhythm no murmurs rubs gallops appreciated  Abd:  Soft nontender with positive bowel sounds  Peg tube noted  Ext:  No clubbing cyanosis or edema  Neuro:  Sedated on propofol  Skin:  Warm dry and intact, no rash      Diagnostic Data:  Lab: I have personally reviewed pertinent lab results  CBC:     Results from last 7 days  Lab Units 19  0432 19  1933   WBC Thousand/uL 8 24 10 68*   HEMOGLOBIN g/dL 12 7 13 4   HEMATOCRIT % 36 9 38 6   PLATELETS Thousands/uL 210 247       CMP:     Results from last 7 days  Lab Units 19  0432 19  1933   SODIUM mmol/L 136 136   POTASSIUM mmol/L 4 1 3 6   CHLORIDE mmol/L 97* 96*   CO2 mmol/L 24 25   BUN mg/dL 19 19   CREATININE mg/dL 1 24 1 38*   CALCIUM mg/dL 9 1 9 9   ALK PHOS U/L  --  86   ALT U/L  --  15   AST U/L  --  16     PT/INR:   No results found for: PT, INR,   Magnesium:     Phosphorous:       Microbiology:  No pending microbiology    Imagin  Increasing size of left neck mass centered in the left lobe of the 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  Increased rightward shift of the trachea with at least moderate narrowing  2   Mildly increased size of left level 2, 3 and 4 and right level 2 lymph nodes  3   Stable encasement and infiltration of the upper esophagus  4   Partially visualized pulmonary metastases, also increased in size        Cardiac lab/EKG/telemetry/ECHO:   Normal sinus rhythm    VTE Prophylaxis:  On hold secondary to surgery    Code Status: Level 1 - Full Code    JETHRO Anaya    Portions of the record may have been created with voice recognition software  Occasional wrong word or "sound a like" substitutions may have occurred due to the inherent limitations of voice recognition software  Read the chart carefully and recognize, using context, where substitutions have occurred

## 2019-01-18 NOTE — ASSESSMENT & PLAN NOTE
Lab Results   Component Value Date    HGBA1C 8 7 (H) 12/30/2018     · Add Accu-Cheks and sliding scale  · Hold oral meds and long acting insulin while NPO  No results for input(s): POCGLU in the last 72 hours      Blood Sugar Average: Last 72 hrs:

## 2019-01-18 NOTE — OP NOTE
OPERATIVE REPORT  PATIENT NAME: Julio Pierson    :  1955  MRN: [id number]  Pt Location: [facility] OR ROOM 06    SURGERY DATE: 2019    Surgeon(s) and Role:     * Monique oLtt DO - Primary     * Ryan Davila DO - Assisting - help to identify structures, tamponade bleeding, and evaluate trachea    Preop Diagnosis:  Thyroid cancer ([address] ) [C73]    Post-Op Diagnosis Codes: * Thyroid cancer ([address] ) [C73]    Procedure(s) (LRB):  TRACHEOSTOMY (N/A) - neck exploration with attempted tracheostomy    Specimen(s):  * No specimens in log *    Estimated Blood Loss:   Minimal    Drains:  NG/OG/Enteral Tube Orogastric 18 Fr Right mouth (Active)   Number of days: 0       Gastrostomy/Enterostomy Percutaneous endoscopic gastrostomy (PEG) 20 Fr  LUQ (Active)   Surrounding Skin Dry; Intact 2019  8:00 AM   Drain Status Clamped 2019  8:00 AM   Drainage Appearance None 2019  8:00 AM   Site Description Scabbed 2019  8:00 AM   Dressing Status Clean;Dry; Intact 2019  8:00 AM   Dressing Intervention Dressing changed 2019  8:00 AM   Dressing Type Dry dressing 2019  8:00 AM   Intake (mL) 60 mL 2019  1:52 AM   Number of days: 21       Anesthesia Type:   General    Operative Indications:  Thyroid cancer ([address] ) [C73]  Airway obstruction    Operative Findings:  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    Complications:   None    Procedure and Technique:  Patient was identified in the holding area  He was taken to the operating room placed on the OR table in supine position where he was intubated with a endotracheal tube without any difficulty  Once he was intubated he was placed on a shoulder roll head in extension  He had a neck cleansed with alcohol and this was allowed to dry appropriately brief time-out was obtained and all information about the patient was noted to be correct    Marking pen was used to hope out an incision site and trachea horizontally and then this was injected with 8 mL of 1% xylocaine with 1 100,000 epinephrine  The patient was then prepped and draped sterile fashion and a formal time-out was obtained  Once this was completed the incision was made with a 15 blade through the skin and subcutaneous fat  Any bleeding vessels were cauterized the Bovie  There was significant increased vasculature within the neck most likely secondary to tumor formation  The larger vessels were clamped, cut, and tied with a 2 0 silk ties  Unfortunately the neck was removed with tumor very difficult to determine any anatomic landmarks  Eventually I was able to get through the strap muscles once identified using the hemostat and Bovie cautery but unfortunately the trachea was completely encased in tumor  At each moment as we tried to dissect through this tumor there was significant chance of bleeding and there was oozing from the tissues  Despite removing all straps from the area I was unable to determine where the thyroid cartilage, cricoid cartilage, and tracheal rings were located  I was unable to create an airway in a safe way  I felt that if I was to open up this mass covering the trachea the risk of significant hemorrhage and/or airway obstruction would be more significant than leaving the airway on opened  Decision was made by myself and Dr Basilio Álvarez and after discussing the sterilely 2 and our dissection and avoid trach placement  Once again there was fear of uncontrolled tumor bleeding and airway obstruction if we were to go any further  At this point the wound was closed in a layered fashion using interrupted 4 0 Vicryl for the platysma and staples for the skin  He was clean with wet to dries and then topical antibiotic ointment was placed on the wound  He was woken and left intubated and transferred to the ICU in this condition where we will discuss the case further with his family     I was present for the entire procedure    Patient Disposition:  PACU SIGNATURE: Beau Gavin DO  DATE: January 18, 2019  TIME: 12:39 PM

## 2019-01-18 NOTE — ASSESSMENT & PLAN NOTE
· Seen on prior CT in December although has increased in size  · CT of neck shows increasing size of left neck mass centered in the left lobe of the 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  Increased rightward shift of the trachea with at least moderate narrowing  Stable encasement and infiltration of the upper esophagus  · Reported improvement with steroids, will continue Solu-Medrol 40 mg TID  · NPO, IVF  · O2 sat monitoring  · ENT consult  · ER discussed with ENT, was instructed to make patient NPO, continue steroids, they will see him in the morning

## 2019-01-18 NOTE — H&P
H&P- Gunnar Brown 1955, 59 y o  male MRN: [id number]    Unit/Bed#: ED 29 Encounter: [id number]    Primary Care Provider: Rosemary Butts MD   Date and time admitted to hospital: 1/17/2019  7:01 PM      * Tracheal obstruction   Assessment & Plan    · Seen on prior CT in December although has increased in size  · CT of neck shows increasing size of left neck mass centered in the left lobe of the 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  Increased rightward shift of the trachea with at least moderate narrowing  Stable encasement and infiltration of the upper esophagus  · Reported improvement with steroids, will continue Solu-Medrol 40 mg TID  · NPO, IVF  · O2 sat monitoring  · ENT consult  · ER discussed with ENT, was instructed to make patient NPO, continue steroids, they will see him in the morning  Accelerated hypertension   Assessment & Plan    · On Toprol, amlodipine and benazepril,  crush and give via PEG  · Continue his clonidine patch     Primary cancer of thyroid with metastasis to other site [facility])   Assessment & Plan    · Diagnosed in December, 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 cervical neck metastases    · S/p Successful placement of right chest port through right IJV access 1/17/19     Oropharyngeal dysphagia   Assessment & Plan    · S/p PEG tube on 12/28/18  · Crush meds and give via PEG  · Aspiration precautions     CKD (chronic kidney disease) stage 3, GFR 30-59 ml/min ([facility])   Assessment & Plan    · Creatinine 1 3, at baseline  · Avoid NSAIDs and nephrotoxins  · Monitor BMP  · Outpatient follow-up with Nephrology     Elevated TSH   Assessment & Plan    · TSH 99; metastatic anaplastic thyroid carcinoma     Gastroesophageal reflux disease without esophagitis   Assessment & Plan    · IV PPI qd     Type 2 diabetes mellitus without complication, without long-term current use of insulin ([facility] ) Assessment & Plan    Lab Results   Component Value Date    HGBA1C 8 7 (H) 12/30/2018     · Add Accu-Cheks and sliding scale  · Hold oral meds and long acting insulin while NPO  No results for input(s): POCGLU in the last 72 hours  Blood Sugar Average: Last 72 hrs:         VTE Prophylaxis: Heparin  / sequential compression device   Code Status: FC  POLST: POLST is not applicable to this patient  Discussion with family:     Anticipated Length of Stay:  Patient will be admitted on an Inpatient basis with an anticipated length of stay of  > 2 midnights  Justification for Hospital Stay:  Airway obstruction    Total Time for Visit, including Counseling / Coordination of Care: 1 hour  Greater than 50% of this total time spent on direct patient counseling and coordination of care  Chief Complaint:   Difficulty breathing    History of Present Illness:    Seun Mckeon is a 59 y o  male who presents with c/o shortness of breath, states his throat felt tight, was wheezing and could not catch his breath  Has these episodes occasionally but this was much worse, often finds himself having to sit and catch his breath  S/p peg, states he eats liquids or fine foods, states food is thinner than mash potato consistency  Denies chest pain, palpitations, abdominal pain, nausea, emesis, diarrhea or dysuria  Patient irritable, providing limited history, is upset because he feels he is not getting his cancer treatment soon enough  Review of Systems:    Review of Systems   Constitutional: Positive for fatigue  HENT: Positive for trouble swallowing  Negative for voice change  Respiratory: Positive for shortness of breath and wheezing  Negative for choking  Throat felt tight   Cardiovascular: Negative  Gastrointestinal: Positive for constipation  Negative for diarrhea, nausea and vomiting  Genitourinary: Negative  Musculoskeletal: Negative  Neurological: Negative for speech difficulty  Psychiatric/Behavioral: Negative  Past Medical and Surgical History:     Past Medical History:   Diagnosis Date    [facility] )     [facility] )     GERD (gastroesophageal reflux disease)     Hypertension        Past Surgical History:   Procedure Laterality Date    COLONOSCOPY      ESOPHAGOGASTRODUODENOSCOPY N/A 12/28/2018    Procedure: ESOPHAGOGASTRODUODENOSCOPY (EGD) with PEG placement;  Surgeon: Sue Malcolm MD;  Location: [facility]; Service: Gastroenterology    FRACTURE SURGERY Bilateral 2008    b/l legs    HERNIA REPAIR      IR PORT PLACEMENT  1/17/2019    LEG SURGERY Bilateral     US GUIDED LYMPH NODE BIOPSY LEFT  12/24/2018    US GUIDED THYROID BIOPSY  12/24/2018       Meds/Allergies:    Prior to Admission medications    Medication Sig Start Date End Date Taking?  Authorizing Provider   amLODIPine-benazepril (LOTREL 5-20) 5-20 MG per capsule Take 1 capsule by mouth 2 (two) times a day   Yes Historical Provider, MD   aspirin (ECOTRIN LOW STRENGTH) 81 mg EC tablet Take 81 mg by mouth daily   Yes Historical Provider, MD   cloNIDine (CATAPRES-TTS-3) 0 3 mg/24 hr Place 1 patch (0 3 mg total) on the skin once a week 12/30/18  Yes Vahid Lord,    cyanocobalamin (VITAMIN B-12) 500 mcg tablet Take 500 mcg by mouth daily   Yes Historical Provider, MD   insulin degludec (TRESIBA) 100 units/mL injection pen Inject under the skin daily   Yes Historical Provider, MD   insulin glargine (LANTUS) 100 units/mL subcutaneous injection Inject 25 Units under the skin every morning 12/31/18  Yes Vahid Lord DO   metoprolol succinate (TOPROL-XL) 100 mg 24 hr tablet Take 100 mg by mouth 2 (two) times a day   Yes Historical Provider, MD   omeprazole (PriLOSEC) 20 mg delayed release capsule Take 20 mg by mouth daily   Yes Historical Provider, MD   ondansetron (ZOFRAN-ODT) 4 mg disintegrating tablet Take 1 tablet (4 mg total) by mouth every 6 (six) hours as needed for nausea or vomiting 1/10/19  Yes Chalo Marley MD   repaglinide (PRANDIN) 2 mg tablet Take 1 tablet (2 mg total) by mouth 3 (three) times a day before meals 12/30/18  Yes Delford Grain, DO   sitaGLIPtin (JANUVIA) 100 mg tablet Take 100 mg by mouth daily   Yes Historical Provider, MD     I have reviewed home medications with patient personally  Allergies: No Known Allergies    Social History:     Marital Status: /Civil Union   Occupation: reited  Patient Pre-hospital Living Situation:  Resides at home with spouse  Patient Pre-hospital Level of Mobility:  Ambulatory, cane p r n  Patient Pre-hospital Diet Restrictions:  Liquid diet; nectar thick   Substance Use History:   History   Alcohol Use No     History   Smoking Status    Former Smoker   Smokeless Tobacco    Never Used     Comment: Former chewing tobacco use     History   Drug Use No       Family History:    Family History   Problem Relation Age of Onset    Thyroid disease Mother     Prostate cancer Father        Physical Exam:     Vitals:   Blood Pressure: 154/78 (01/18/19 0019)  Pulse: 83 (01/18/19 0019)  Temperature: 98 6 °F (37 °C) (01/18/19 0019)  Temp Source: Oral (01/17/19 1915)  Respirations: 18 (01/18/19 0019)  SpO2: 98 % (01/18/19 0019)    Physical Exam   Constitutional: He is oriented to person, place, and time  He appears well-developed and well-nourished  No distress  HENT:   Head: Normocephalic and atraumatic  Eyes: Conjunctivae are normal  No scleral icterus  Pulmonary/Chest: Effort normal  No respiratory distress  He exhibits no tenderness  Coarse breath sounds bilaterally, no stridor   Abdominal: Soft  Bowel sounds are normal  He exhibits no distension and no mass  There is no tenderness  There is no rebound and no guarding  Musculoskeletal: He exhibits no edema  Neurological: He is alert and oriented to person, place, and time  Skin: Skin is warm and dry  No rash noted  He is not diaphoretic  No erythema  No pallor     Psychiatric: His behavior is normal  Judgment and thought content normal    irritable     Additional Data:     Lab Results: I have personally reviewed pertinent reports  Results from last 7 days  Lab Units 01/17/19  1933   WBC Thousand/uL 10 68*   HEMOGLOBIN g/dL 13 4   HEMATOCRIT % 38 6   PLATELETS Thousands/uL 247   NEUTROS PCT % 74   LYMPHS PCT % 13*   MONOS PCT % 10   EOS PCT % 1       Results from last 7 days  Lab Units 01/17/19  1933   SODIUM mmol/L 136   POTASSIUM mmol/L 3 6   CHLORIDE mmol/L 96*   CO2 mmol/L 25   BUN mg/dL 19   CREATININE mg/dL 1 38*   ANION GAP mmol/L 15*   CALCIUM mg/dL 9 9   ALBUMIN g/dL 3 3*   TOTAL BILIRUBIN mg/dL 1 19*   ALK PHOS U/L 86   ALT U/L 15   AST U/L 16   GLUCOSE RANDOM mg/dL 130                       Imaging: I have personally reviewed pertinent reports  CT soft tissue neck   Final Result by Cherise Hawkins MD (01/17 2103)      1  Increasing size of left neck mass centered in the left lobe of the 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  Increased rightward shift of the trachea with at least moderate narrowing  2   Mildly increased size of left level 2, 3 and 4 and right level 2 lymph nodes  3   Stable encasement and infiltration of the upper esophagus  4   Partially visualized pulmonary metastases, also increased in size  Workstation performed: [id number]             EKG, Pathology, and Other Studies Reviewed on Admission:   · EKG: CT PET    Allscripts / Epic Records Reviewed: Yes     ** Please Note: This note has been constructed using a voice recognition system   **

## 2019-01-18 NOTE — ED PROVIDER NOTES
History  Chief Complaint   Patient presents with    Airway Obstruction     Presents to ED with hx of thyroid cancer  Reports went to testing today, took a nap and woke up feeling like his thyroid was swelling and obstructing his airway  58 YO male with Hx of thyroid cancer presents with globus sensation  States he woke this evening from a nap with a feeling of shortness of breath and increased difficulty with swallowing  Pt has been able to tolerate secretions but has not tried to eat  Pt has recent diagnosis of thyroid cancer, within the last month  He has not started treatment yet, did have a port placed today  Pt states he has had similar sensations of constriction in the throat but states usually it is not as severe and improves, it has not improved today  Pt has had mucous production, has been having difficulty clearing this  Pt denies CP/F/C/N/V/D/C, no dysuria, burning on urination or blood in urine  History provided by:  Patient   used: No    Shortness of Breath   Severity:  Moderate  Onset quality:  Sudden  Duration:  3 hours  Timing:  Constant  Progression:  Unchanged  Chronicity:  New  Relieved by:  Nothing  Worsened by:  Nothing  Ineffective treatments:  None tried  Associated symptoms: cough    Associated symptoms: no abdominal pain, no chest pain, no fever, no neck pain, no rash, no sore throat, no sputum production, no vomiting and no wheezing    Cough:     Cough characteristics:  Productive    Sputum characteristics:  Nondescript    Severity:  Moderate    Onset quality:  Gradual    Duration:  3 hours    Timing:  Intermittent    Progression:  Waxing and waning    Chronicity:  New      Prior to Admission Medications   Prescriptions Last Dose Informant Patient Reported? Taking?    amLODIPine-benazepril (LOTREL 5-20) 5-20 MG per capsule   Yes Yes   Sig: Take 1 capsule by mouth 2 (two) times a day   aspirin (ECOTRIN LOW STRENGTH) 81 mg EC tablet   Yes Yes   Sig: Take 81 mg by mouth daily   cloNIDine (CATAPRES-TTS-3) 0 3 mg/24 hr   No Yes   Sig: Place 1 patch (0 3 mg total) on the skin once a week   cyanocobalamin (VITAMIN B-12) 500 mcg tablet   Yes Yes   Sig: Take 500 mcg by mouth daily   insulin degludec (TRESIBA) 100 units/mL injection pen   Yes Yes   Sig: Inject under the skin daily   insulin glargine (LANTUS) 100 units/mL subcutaneous injection   No Yes   Sig: Inject 25 Units under the skin every morning   metoprolol succinate (TOPROL-XL) 100 mg 24 hr tablet   Yes Yes   Sig: Take 100 mg by mouth 2 (two) times a day   omeprazole (PriLOSEC) 20 mg delayed release capsule   Yes Yes   Sig: Take 20 mg by mouth daily   ondansetron (ZOFRAN-ODT) 4 mg disintegrating tablet   No Yes   Sig: Take 1 tablet (4 mg total) by mouth every 6 (six) hours as needed for nausea or vomiting   repaglinide (PRANDIN) 2 mg tablet   No Yes   Sig: Take 1 tablet (2 mg total) by mouth 3 (three) times a day before meals   sitaGLIPtin (JANUVIA) 100 mg tablet   Yes Yes   Sig: Take 100 mg by mouth daily      Facility-Administered Medications: None       Past Medical History:   Diagnosis Date    Cancer ([facility] )     Diabetes mellitus ([facility] )     GERD (gastroesophageal reflux disease)     Hypertension        Past Surgical History:   Procedure Laterality Date    COLONOSCOPY      ESOPHAGOGASTRODUODENOSCOPY N/A 12/28/2018    Procedure: ESOPHAGOGASTRODUODENOSCOPY (EGD) with PEG placement;  Surgeon: Steve Patrick MD;  Location: [facility]; Service: Gastroenterology    FRACTURE SURGERY Bilateral 2008    b/l legs    HERNIA REPAIR      IR PORT PLACEMENT  1/17/2019    LEG SURGERY Bilateral     US GUIDED LYMPH NODE BIOPSY LEFT  12/24/2018    US GUIDED THYROID BIOPSY  12/24/2018       Family History   Problem Relation Age of Onset    Thyroid disease Mother     Prostate cancer Father      I have reviewed and agree with the history as documented      Social History   Substance Use Topics    Smoking status: Former Smoker  Smokeless tobacco: Never Used      Comment: Former chewing tobacco use    Alcohol use No        Review of Systems   Constitutional: Negative for fever  HENT: Negative for dental problem and sore throat  Eyes: Negative for visual disturbance  Respiratory: Positive for cough and shortness of breath  Negative for sputum production and wheezing  Cardiovascular: Negative for chest pain  Gastrointestinal: Negative for abdominal pain, nausea and vomiting  Genitourinary: Negative for dysuria and frequency  Musculoskeletal: Negative for neck pain and neck stiffness  Skin: Negative for rash  Neurological: Negative for dizziness, weakness and light-headedness  Psychiatric/Behavioral: Negative for agitation, behavioral problems and confusion  All other systems reviewed and are negative  Physical Exam  Physical Exam   Constitutional: He is oriented to person, place, and time  He appears well-developed and well-nourished  HENT:   Head: Normocephalic and atraumatic  Mouth/Throat: Oropharynx is clear and moist    Eyes: Pupils are equal, round, and reactive to light  EOM are normal    Neck: Normal range of motion  Cardiovascular: Normal rate, regular rhythm and normal heart sounds  Pulmonary/Chest: Effort normal and breath sounds normal  He has no wheezes  Abdominal: Soft  Musculoskeletal: Normal range of motion  Neurological: He is alert and oriented to person, place, and time  Skin: Skin is warm and dry  Psychiatric: He has a normal mood and affect  His behavior is normal    Nursing note and vitals reviewed        Vital Signs  ED Triage Vitals [01/17/19 1908]   Temperature Pulse Respirations Blood Pressure SpO2   98 °F (36 7 °C) 89 (!) 26 168/92 94 %      Temp Source Heart Rate Source Patient Position - Orthostatic VS BP Location FiO2 (%)   Oral Monitor Lying Left arm --      Pain Score       No Pain           Vitals:    01/18/19 1345 01/18/19 1350 01/18/19 1445 01/18/19 1515 BP: 155/84 (!) 199/94 130/75 138/77   Pulse: 66 72 66 62   Patient Position - Orthostatic VS:           Visual Acuity  Visual Acuity      Most Recent Value   L Pupil Size (mm)  4   R Pupil Size (mm)  4   L Pupil Shape  Round   R Pupil Shape  Round          ED Medications  Medications   cloNIDine (CATAPRES-TTS-3) 0 3 mg/24 hr TD weekly patch (not administered)   cyanocobalamin (VITAMIN B-12) tablet 500 mcg (500 mcg Per PEG Tube Given 1/18/19 0916)   sodium chloride 0 9 % infusion ( Intravenous Anesthesia Volume Adjustment 1/18/19 1249)   ondansetron (ZOFRAN) injection 4 mg ( Intravenous MAR Unhold 1/18/19 1334)   methylPREDNISolone sodium succinate (Solu-MEDROL) injection 40 mg (40 mg Intravenous Given 1/18/19 1427)   heparin (porcine) subcutaneous injection 5,000 Units (5,000 Units Subcutaneous Given 1/18/19 1441)   insulin lispro (HumaLOG) 100 units/mL subcutaneous injection 1-6 Units ( Subcutaneous MAR Unhold 1/18/19 1334)   insulin lispro (HumaLOG) 100 units/mL subcutaneous injection 1-5 Units ( Subcutaneous MAR Unhold 1/18/19 1334)   acetaminophen (TYLENOL) oral suspension 650 mg ( Oral MAR Unhold 1/18/19 1334)   metoprolol tartrate (LOPRESSOR) tablet 100 mg ( Per PEG Tube MAR Unhold 1/18/19 1334)   pantoprazole (PROTONIX) injection 40 mg ( Intravenous MAR Unhold 1/18/19 1334)   lisinopril (ZESTRIL) tablet 20 mg ( Oral MAR Unhold 1/18/19 1334)     And   amLODIPine (NORVASC) tablet 5 mg ( Oral MAR Unhold 1/18/19 1334)   fentaNYL (SUBLIMAZE) injection 25 mcg (not administered)   ondansetron (ZOFRAN) injection 4 mg (not administered)   chlorhexidine (PERIDEX) 0 12 % oral rinse 15 mL (15 mL Swish & Spit Given 1/18/19 1427)   fentanyl citrate (PF) 100 MCG/2ML 50 mcg (50 mcg Intravenous Given 1/18/19 1438)   propofol (DIPRIVAN) 1000 mg in 100 mL infusion (premix) (50 mcg/kg/min × 106 kg Intravenous New Bag 1/18/19 1441)   sodium chloride 0 9 % bolus 1,000 mL (0 mL Intravenous Stopped 1/17/19 9584)   iohexol (OMNIPAQUE) 350 MG/ML injection (MULTI-DOSE) 85 mL (85 mL Intravenous Given 1/17/19 2034)   dexamethasone (DECADRON) injection 10 mg (10 mg Intravenous Given 1/17/19 2216)       Diagnostic Studies  Results Reviewed     Procedure Component Value Units Date/Time    Urine Microscopic [[id number]]  (Abnormal) Collected:  01/17/19 2206    Lab Status:  Final result Specimen:  Urine from Urine, Clean Catch Updated:  01/17/19 2219     RBC, UA None Seen /hpf      WBC, UA 0-1 (A) /hpf      Epithelial Cells Occasional /hpf      Bacteria, UA Occasional /hpf     POCT urinalysis dipstick [[id number]]  (Normal) Resulted:  01/17/19 2150    Lab Status:  Final result Specimen:  Urine Updated:  01/17/19 2151     Color, UA yellow    ED Urine Macroscopic [[id number]]  (Abnormal) Collected:  01/17/19 2206    Lab Status:  Final result Specimen:  Urine Updated:  01/17/19 2149     Color, UA Yellow     Clarity, UA Clear     pH, UA 5 5     Leukocytes, UA Negative     Nitrite, UA Negative     Protein,  (2+) (A) mg/dl      Glucose, UA Negative mg/dl      Ketones, UA 40 (2+) (A) mg/dl      Urobilinogen, UA 0 2 E U /dl      Bilirubin, UA Interference- unable to analyze (A)     Blood, UA Negative     Specific Gravity, UA 1 015    Narrative:       CLINITEK RESULT    Hepatic function panel [[id number]]  (Abnormal) Collected:  01/17/19 1933    Lab Status:  Final result Specimen:  Blood from Arm, Left Updated:  01/17/19 2014     Total Bilirubin 1 19 (H) mg/dL      Bilirubin, Direct 0 37 (H) mg/dL      Alkaline Phosphatase 86 U/L      AST 16 U/L      ALT 15 U/L      Total Protein 8 0 g/dL      Albumin 3 3 (L) g/dL     TSH [[id number]]  (Abnormal) Collected:  01/17/19 1933    Lab Status:  Final result Specimen:  Blood from Arm, Left Updated:  01/17/19 2014     TSH 3RD GENERATON 99 442 (H) uIU/mL     Narrative:         Patients undergoing fluorescein dye angiography may retain small amounts of fluorescein in the body for 48-72 hours post procedure  Samples containing fluorescein can produce falsely depressed TSH values  If the patient had this procedure,a specimen should be resubmitted post fluorescein clearance  Basic metabolic panel [[id number]]  (Abnormal) Collected:  01/17/19 1933    Lab Status:  Final result Specimen:  Blood from Arm, Left Updated:  01/17/19 2010     Sodium 136 mmol/L      Potassium 3 6 mmol/L      Chloride 96 (L) mmol/L      CO2 25 mmol/L      ANION GAP 15 (H) mmol/L      BUN 19 mg/dL      Creatinine 1 38 (H) mg/dL      Glucose 130 mg/dL      Calcium 9 9 mg/dL      eGFR 54 ml/min/1 73sq m     Narrative:         National Kidney Disease Education Program recommendations are as follows:  GFR calculation is accurate only with a steady state creatinine  Chronic Kidney disease less than 60 ml/min/1 73 sq  meters  Kidney failure less than 15 ml/min/1 73 sq  meters  CBC and differential [[id number]]  (Abnormal) Collected:  01/17/19 1933    Lab Status:  Final result Specimen:  Blood from Arm, Left Updated:  01/17/19 1940     WBC 10 68 (H) Thousand/uL      RBC 4 32 Million/uL      Hemoglobin 13 4 g/dL      Hematocrit 38 6 %      MCV 89 fL      MCH 31 0 pg      MCHC 34 7 g/dL      RDW 12 6 %      MPV 8 9 fL      Platelets 013 Thousands/uL      nRBC 0 /100 WBCs      Neutrophils Relative 74 %      Immat GRANS % 1 %      Lymphocytes Relative 13 (L) %      Monocytes Relative 10 %      Eosinophils Relative 1 %      Basophils Relative 1 %      Neutrophils Absolute 8 11 (H) Thousands/µL      Immature Grans Absolute 0 05 Thousand/uL      Lymphocytes Absolute 1 35 Thousands/µL      Monocytes Absolute 1 01 Thousand/µL      Eosinophils Absolute 0 10 Thousand/µL      Basophils Absolute 0 06 Thousands/µL                  X-ray chest 1 view   Final Result by Moustapha Brian MD (01/18 3215)      Endotracheal tube tip at the level of the thoracic inlet, and should be advanced 5 cm into the trachea        Left basilar opacity consistent with small pleural effusion and consolidation due to atelectasis or pneumonia  Mild right basilar consolidation also noted  The study was marked in [facility] for immediate notification  Workstation performed: [id number]         CT soft tissue neck   Final Result by Poonam Verdugo MD (01/17 2103)      1  Increasing size of left neck mass centered in the left lobe of the 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  Increased rightward shift of the trachea with at least moderate narrowing  2   Mildly increased size of left level 2, 3 and 4 and right level 2 lymph nodes  3   Stable encasement and infiltration of the upper esophagus  4   Partially visualized pulmonary metastases, also increased in size  Workstation performed: [id number]                    Procedures  Procedures       Phone Contacts  ED Phone Contact    ED Course                               MDM  Number of Diagnoses or Management Options  Thyroid cancer [facility]): new and requires workup  Diagnosis management comments: 1  SOB - Pt with known thyroid mass  Airway appears patent and Pt has normal saturation but concern that mass may be constricting the trachea  Will check kidney function and CT neck         Amount and/or Complexity of Data Reviewed  Clinical lab tests: ordered and reviewed  Tests in the radiology section of CPT®: ordered and reviewed  Obtain history from someone other than the patient: yes  Discuss the patient with other providers: yes  Independent visualization of images, tracings, or specimens: yes    Patient Progress  Patient progress: stable    CritCare Time    Disposition  Final diagnoses:   Thyroid cancer ([facility] )     Time reflects when diagnosis was documented in both MDM as applicable and the Disposition within this note     Time User Action Codes Description Comment    1/17/2019 10:14 PM [address] Thyroid cancer ([facility] )     1/18/2019  1:27 AM Michael Flores [J39 [id number]] Tracheal obstruction 1/18/2019  1:27 AM [address] Modify [J39 8] Tracheal obstruction       ED Disposition     ED Disposition Condition Comment    Admit  Case was discussed with [facility] PA and the patient's admission status was agreed to be Admission Status: inpatient status to the service of Dr Elio Murillo           Follow-up Information    None         Current Discharge Medication List      CONTINUE these medications which have NOT CHANGED    Details   amLODIPine-benazepril (LOTREL 5-20) 5-20 MG per capsule Take 1 capsule by mouth 2 (two) times a day      aspirin (ECOTRIN LOW STRENGTH) 81 mg EC tablet Take 81 mg by mouth daily      cloNIDine (CATAPRES-TTS-3) 0 3 mg/24 hr Place 1 patch (0 3 mg total) on the skin once a week  Qty: 4 patch, Refills: 0    Associated Diagnoses: Essential hypertension      cyanocobalamin (VITAMIN B-12) 500 mcg tablet Take 500 mcg by mouth daily      insulin degludec (TRESIBA) 100 units/mL injection pen Inject under the skin daily      insulin glargine (LANTUS) 100 units/mL subcutaneous injection Inject 25 Units under the skin every morning  Qty: 10 mL, Refills: 0    Associated Diagnoses: Type 2 diabetes mellitus without complication, without long-term current use of insulin ([facility])      metoprolol succinate (TOPROL-XL) 100 mg 24 hr tablet Take 100 mg by mouth 2 (two) times a day      omeprazole (PriLOSEC) 20 mg delayed release capsule Take 20 mg by mouth daily      ondansetron (ZOFRAN-ODT) 4 mg disintegrating tablet Take 1 tablet (4 mg total) by mouth every 6 (six) hours as needed for nausea or vomiting  Qty: 30 tablet, Refills: 1    Associated Diagnoses: Primary cancer of thyroid with metastasis to other site ([facility])      repaglinide (PRANDIN) 2 mg tablet Take 1 tablet (2 mg total) by mouth 3 (three) times a day before meals  Qty: 90 tablet, Refills: 0    Associated Diagnoses: Type 2 diabetes mellitus without complication, without long-term current use of insulin ([facility])      sitaGLIPtin (JANUVIA) 100 mg tablet Take 100 mg by mouth daily           No discharge procedures on file      ED Provider  Electronically Signed by           Julisa Cabrera MD  01/18/19 0861

## 2019-01-18 NOTE — ANESTHESIA POSTPROCEDURE EVALUATION
Post-Op Assessment Note      CV Status:  Stable    Mental Status:  Unresponsive (sedated on ppf)    Hydration Status:  Euvolemic and stable    PONV Controlled:  None    Airway Patency:  Patent  Airway: intubated    Post Op Vitals Reviewed: Yes          Staff: CRNA       Comments: report to ICU staff          BP      Temp     Pulse     Resp      SpO2

## 2019-01-19 NOTE — PROGRESS NOTES
Progress Note - Critical Care   Fernandez Vázquez 59 y o  male MRN: [id number]  Unit/Bed#: MICU 04 Encounter: [id number]    Attending Physician: Kaley Fernandez, DO    ______________________________________________________________________  Assessment and Plan:   Principal Problem:    Airway obstruction  Active Problems:    Type 2 diabetes mellitus without complication, without long-term current use of insulin ([address] )    Essential hypertension    Gastroesophageal reflux disease without esophagitis    Primary cancer of thyroid with metastasis to other site [facility])    Difficulty in swallowing    Elevated TSH    CKD (chronic kidney disease) stage 3, GFR 30-59 ml/min ([facility])  Resolved Problems:    * No resolved hospital problems  *        Neuro: Continue routine neuro checks and delirium precautions  Fentanyl and propofol infusions for sedation, will add fentanyl PRN as needed  CV: HDS  Continue telemetry monitoring  Continue to hold all home antiHTN to due NPO status    Pulm:  Continue vent settings, consider trial SBT today with no plan for extubation due to airway compromise  GI: NPO  Patient with a history of GERD, continue Protonix for PUD prophylaxis & GERD treatment    :  Continue isolyte infusion at 100 ml/hr  Continue close monitoring of renal function and electrolytes daily  ID:  Leukocytosis, afebrile  No sign of acute infection  Close monitoring fever curve and WBCs    Heme:  Hemoglobin adequate, continue subcu heparin for DVT prophylaxis    Endo: Insulin-dependent diabetic blood sugars range from 206-259  Hemoglobin A1c was 8 7 in December  Pt's SSI was just increased to Alg 3 at midnight, he does use 25 units of lantus QAM at home  Will give 12 units of lantus this AM and assess insulin needs throughout the day  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, unable to place trach yesterday due to tumor burden  Will consult ENT, oncology and radiation onc here  TSH this AM 40 9     Msk/Skin:  Frequent repositioning to offload pressure points    Disposition:  Keep in ICU status    Code Status: Level 1 - Full Code    Counseling / Coordination of Care  Total Critical Care time spent 33 minutes excluding procedures, teaching and family updates  ______________________________________________________________________    Chief Complaint:  Unable to assess secondary to intubation    24 Hour Events:  Was transferred from [facility] [place], [place] yesterday for evaluation by Radiation Oncology  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  Attempted to place trach yesterday but was unable to so pt remained intubated and was transferred  Review of Systems   All other systems reviewed and are negative     ______________________________________________________________________    Physical Exam:   Physical Exam   Constitutional: He appears well-developed and well-nourished  No distress  HENT:   Head: Normocephalic and atraumatic  Eyes: Pupils are equal, round, and reactive to light  Conjunctivae are normal    Neck: Normal range of motion  Neck supple  Cardiovascular: Regular rhythm, normal heart sounds and intact distal pulses  Exam reveals no gallop and no friction rub  No murmur heard  Heart rate 55 beats per minute   Pulmonary/Chest: No respiratory distress  He has no wheezes  On AC vent settings 14/450/40/5 with peak pressure of 18, plateau pressure of 14  SpO2 97%, lung sounds clear to auscultation bilateral   Abdominal: Soft  Bowel sounds are normal  He exhibits no distension  There is no tenderness  Musculoskeletal: He exhibits no edema or deformity  Neurological:   Sedated on fentanyl and propofol, not following commands but withdrawing all extremities  Pupils equal reactive bilateral   Skin: Skin is warm and dry   He is not diaphoretic      ______________________________________________________________________  Vitals: 19 0336 19 0400 19 0500 19 0600   BP:  121/65 118/63 114/63   BP Location:  Right arm     Pulse:  60 56 56   Resp:  14     Temp:  (!) 97 °F (36 1 °C)     TempSrc:  Oral     SpO2: 96% 96% 97% 97%   Weight:  107 kg (235 lb 14 3 oz)     Height:           Temperature:   Temp (24hrs), Av 3 °F (36 3 °C), Min:95 9 °F (35 5 °C), Max:98 7 °F (37 1 °C)    Current Temperature: (!) 97 °F (36 1 °C)  Weights:   IBW: 73 kg    Body mass index is 33 85 kg/m²  Weight (last 2 days)     Date/Time   Weight    19 0400  107 (235 89)    19 2100  107 (235 23)               Non-Invasive/Invasive Ventilation Settings:  Respiratory    Lab Data (Last 4 hours)    None         O2/Vent Data (Last 4 hours)       033           Vent Mode AC/VC       Resp Rate (BPM) (BPM) 14       Vt (mL) (mL) 450       FIO2 (%) (%) 40       PEEP (cmH2O) (cmH2O) 5       MV 7 29                 Lab Results   Component Value Date    PHART 7 414 2019    VZR3ESZ 32 5 (L) 2019    PO2ART 103 1 2019    [id number] 20 3 (L) 2019    BEART -3 3 2019    SOURCE Radial, Right 2019     SpO2: SpO2: 95 %  Intake and Outputs:  I/O        0701 -  0700  07 -  0700    I V  (mL/kg)  1143 1 (10 7)    NG/GT  90    Total Intake(mL/kg)  1233 1 (11 5)    Urine (mL/kg/hr)  325    Total Output   325    Net   +908 1          Unmeasured Stool Occurrence  0 x        UOP: 35 ml/hr   Nutrition:        Diet Orders            Start     Ordered    19  Diet NPO  Diet effective now     Question Answer Comment   Diet Type NPO    RD to adjust diet per protocol?  No        19        Labs:     Results from last 7 days  Lab Units 19  0500 19  2255 19  0432 19  1933   WBC Thousand/uL 10 33*  --  8 24 10 68*   HEMOGLOBIN g/dL 10 7*  --  12 7 13 4   HEMATOCRIT % 30 8*  --  36 9 38 6   PLATELETS Thousands/uL 202 218 210 247   NEUTROS PCT % 83*  --   --  74   BANDS PCT %  --   --  1  --    MONOS PCT % 6  --   --  10   MONO PCT %  --   --  2*  --        Results from last 7 days  Lab Units 01/19/19  0500 01/18/19  0432 01/17/19  1933   SODIUM mmol/L 134* 136 136   POTASSIUM mmol/L 4 1 4 1 3 6   CHLORIDE mmol/L 99* 97* 96*   CO2 mmol/L 20* 24 25   ANION GAP mmol/L 15* 15* 15*   BUN mg/dL 29* 19 19   CREATININE mg/dL 1 43* 1 24 1 38*   CALCIUM mg/dL 8 1* 9 1 9 9   ALT U/L  --   --  15   AST U/L  --   --  16   ALK PHOS U/L  --   --  86   ALBUMIN g/dL  --   --  3 3*   TOTAL BILIRUBIN mg/dL  --   --  1 19*       Results from last 7 days  Lab Units 01/19/19  0500   MAGNESIUM mg/dL 2 2   PHOSPHORUS mg/dL 4 4*                  ABG:  Lab Results   Component Value Date    PHART 7 414 01/18/2019    [id number] 32 5 (L) 01/18/2019    PO2ART 103 1 01/18/2019    [id number] 20 3 (L) 01/18/2019    BEART -3 3 01/18/2019    SOURCE Radial, Right 01/18/2019       Imaging:  I have personally reviewed pertinent reports  Micro:       Allergies: No Known Allergies  Medications:   Scheduled Meds:  Current Facility-Administered Medications:  chlorhexidine 15 mL Swish [employer] Spit Q12H [address] Pete Duckworth MD    fentaNYL 50 mcg/hr Intravenous Continuous Pete Duckworth MD Last Rate: 50 mcg/hr (01/18/19 2310)   heparin (porcine) 5,000 Units Subcutaneous [facility] Pete Duckworth MD    insulin lispro 1-6 Units Subcutaneous Q6H [address] Pete Duckworth MD    multi-electrolyte 100 mL/hr Intravenous Continuous Pete Duckworth MD Last Rate: 100 mL/hr (01/18/19 2252)   pantoprazole 40 mg Intravenous Q24H [address] Pete Duckworth MD    propofol 5-50 mcg/kg/min Intravenous Titrated Pete Duckworth MD Last Rate: 35 mcg/kg/min (01/19/19 5744)     Continuous Infusions:  fentaNYL 50 mcg/hr Last Rate: 50 mcg/hr (01/18/19 2310)   multi-electrolyte 100 mL/hr Last Rate: 100 mL/hr (01/18/19 7536)   propofol 5-50 mcg/kg/min Last Rate: 35 mcg/kg/min (01/19/19 6202)     PRN Meds:     VTE Pharmacologic Prophylaxis: Heparin  VTE Mechanical Prophylaxis: sequential compression device  Invasive lines and devices: Invasive Devices     Peripheral Intravenous Line            Peripheral IV 01/17/19 Left Antecubital 1 day    Peripheral IV 01/18/19 Left Hand less than 1 day          Drain            Gastrostomy/Enterostomy Percutaneous endoscopic gastrostomy (PEG) 20 Fr  LUQ 21 days    Urethral Catheter Latex 16 Fr  less than 1 day          Airway            ETT  Cuffed;Oral;Straight; Inflated 8 mm less than 1 day                     Portions of the record may have been created with voice recognition software  Occasional wrong word or "sound a like" substitutions may have occurred due to the inherent limitations of voice recognition software  Read the chart carefully and recognize, using context, where substitutions have occurred      Lenny Christianson

## 2019-01-19 NOTE — SOCIAL WORK
Patient is intubated and CM called wife to complete assessment and explain role of CM  Spoke to wife, Juan Robledo, [phone], who reports patient has an Advance Directive and she will provide a copy when she visits  The patient lives with his spouse who is employed but has been available to provide help to patient at home  Patient had peg tube placed Dec 2018 but had not been using it for feedings, only flushes  Wife was preparing pureed meals and shakes for patient to take po  Patient has no DME at home , no HHC and no IP MH or D&A treatment  Wife is overwhelmed with complexity o patient's illness and support ws provided  Explained assistance will be provided to arrange for DME and services at [place] once needs are known  CM reviewed d/c planning process including the following: identifying help at home, patient preference for d/c planning needs, Discharge Lounge, [facility] Meds to Bed program, availability of treatment team to discuss questions or concerns patient and/or family may have regarding understanding medications and recognizing signs and symptoms once discharged  CM also encouraged patient to follow up with all recommended appointments after discharge  Patient advised of importance for patient and family to participate in managing patients medical well being  Patient/caregiver received discharge checklist  Content reviewed  Patient/caregiver encouraged to participate in discharge plan of care prior to discharge home

## 2019-01-19 NOTE — PLAN OF CARE
DISCHARGE PLANNING     Discharge to home or other facility with appropriate resources Progressing        INFECTION - ADULT     Absence or prevention of progression during hospitalization Progressing     Absence of fever/infection during neutropenic period Progressing        Knowledge Deficit     Patient/family/caregiver demonstrates understanding of disease process, treatment plan, medications, and discharge instructions Progressing        METABOLIC, FLUID AND ELECTROLYTES - ADULT     Electrolytes maintained within normal limits Progressing     Fluid balance maintained Progressing     Glucose maintained within target range Progressing        PAIN - ADULT     Verbalizes/displays adequate comfort level or baseline comfort level Progressing        Prexisting or High Potential for Compromised Skin Integrity     Skin integrity is maintained or improved Progressing        RESPIRATORY - ADULT     Achieves optimal ventilation and oxygenation Progressing        SAFETY ADULT     Patient will remain free of falls Progressing     Maintain or return to baseline ADL function Progressing     Maintain or return mobility status to optimal level Progressing        SAFETY,RESTRAINT: NV/NON-SELF DESTRUCTIVE BEHAVIOR     Remains free of harm/injury (restraint for non violent/non self-detsructive behavior) Progressing     Returns to optimal restraint-free functioning Progressing

## 2019-01-19 NOTE — NURSING NOTE
Pt left via stretcher accompanied by SLETS, intubated with propofol running  VSS   Transferring to [facility]

## 2019-01-19 NOTE — H&P
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 who presents as a transfer from [facility]  History is obtained from chart review as the patient is intubated and sedated  Per review of records, the patient presented to [facility] yesterday with complaints of a globus sensation and difficulty breathing  Patient was admitted and started on IV steroids  He was evaluated by ENT who recommended tracheostomy placement  The patient was intubated in the OR and a tracheostomy was attempted  Unfortunately, due to the patient's extensive tumor burden, a tracheostomy was unable to be successfully placed  Patient was transferred to this facility for radiation therapy with consideration of tracheostomy if the tumor burden is reduced  Patient appears to have been recently diagnosed with anaplastic thyroid carcinoma  Review of records shows a CT of the neck performed on December 24th which demonstrated significant enlargement of the left thyroid gland secondary to mass which was inseparable from the upper esophagus  Esophageal thickening was noted extending into the upper mediastinum  Enhancing adenopathy was noted in the neck as well as in the upper mediastinum inferior to the mass  There also were pulmonary nodules and a pleural based mass in the left apex noted, suspicious for metastases  Patient received a PET-CT which demonstrated the mass, pulmonary metastases, multiple osseous metastases, and a liver metastasis    Repeat CT of the neck was performed on January 17th and demonstrated interval increase in the size of the primary mass as well as lymphadenopathy and metastases  The patient has not received any oncologic treatment including radiation therapy or chemotherapy at this time  Patient has minimal comorbidities otherwise  He has diabetes mellitus but does not take insulin  Diabetes appears to be poorly controlled with a recent hemoglobin A1c of 8 7%  History obtained from chart review and unobtainable from patient due to intubation/sedation  Past Medical History:  Past Medical History:   Diagnosis Date    Cancer ([facility] )     Diabetes mellitus ([facility] )     GERD (gastroesophageal reflux disease)     Hypertension         Past Surgical History:  Past Surgical History:   Procedure Laterality Date    COLONOSCOPY      ESOPHAGOGASTRODUODENOSCOPY N/A 12/28/2018    Procedure: ESOPHAGOGASTRODUODENOSCOPY (EGD) with PEG placement;  Surgeon: Giovanni Ace MD;  Location: [facility];   Service: Gastroenterology    FRACTURE SURGERY Bilateral 2008    b/l legs    HERNIA REPAIR      IR PORT PLACEMENT  1/17/2019    LEG SURGERY Bilateral     US GUIDED LYMPH NODE BIOPSY LEFT  12/24/2018    US GUIDED THYROID BIOPSY  12/24/2018        Past Family History:  Family History   Problem Relation Age of Onset    Thyroid disease Mother     Prostate cancer Father         Social History:  History   Smoking Status    Former Smoker   Smokeless Tobacco    Never Used     Comment: Former chewing tobacco use     History   Alcohol Use No     History   Drug Use No     Marital Status: /Civil Union     Medications:  Current Facility-Administered Medications   Medication Dose Route Frequency    chlorhexidine (PERIDEX) 0 12 % oral rinse 15 mL  15 mL Swish & Spit Q12H [address]    fentaNYL 1250 mcg in sodium chloride 0 9% 125mL drip  50 mcg/hr Intravenous Continuous    heparin (porcine) subcutaneous injection 5,000 Units  5,000 Units Subcutaneous Q8H [address]    propofol (DIPRIVAN) 1000 mg in 100 mL infusion (premix)  5-50 mcg/kg/min Intravenous Titrated     Home medications:  Prior to Admission medications    Medication Sig Start Date End Date Taking? Authorizing Provider   amLODIPine-benazepril (LOTREL 5-20) 5-20 MG per capsule Take 1 capsule by mouth 2 (two) times a day    Historical Provider, MD   aspirin (ECOTRIN LOW STRENGTH) 81 mg EC tablet Take 81 mg by mouth daily    Historical Provider, MD   cloNIDine (CATAPRES-TTS-3) 0 3 mg/24 hr Place 1 patch (0 3 mg total) on the skin once a week 18   Corrina Zaragoza DO   cyanocobalamin (VITAMIN B-12) 500 mcg tablet Take 500 mcg by mouth daily    Historical Provider, MD   insulin degludec (TRESIBA) 100 units/mL injection pen Inject under the skin daily    Historical Provider, MD   insulin glargine (LANTUS) 100 units/mL subcutaneous injection Inject 25 Units under the skin every morning 18   Corrina Zaragoza DO   metoprolol succinate (TOPROL-XL) 100 mg 24 hr tablet Take 100 mg by mouth 2 (two) times a day    Historical Provider, MD   omeprazole (PriLOSEC) 20 mg delayed release capsule Take 20 mg by mouth daily    Historical Provider, MD   ondansetron (ZOFRAN-ODT) 4 mg disintegrating tablet Take 1 tablet (4 mg total) by mouth every 6 (six) hours as needed for nausea or vomiting 1/10/19   Jono Rock MD   repaglinide (PRANDIN) 2 mg tablet Take 1 tablet (2 mg total) by mouth 3 (three) times a day before meals 18   Corrina Zaragoza DO   sitaGLIPtin (JANUVIA) 100 mg tablet Take 100 mg by mouth daily    Historical Provider, MD     Allergies:  No Known Allergies     ROS:   Review of Systems   Unable to perform ROS: Intubated        Vitals:  Vitals:    19 2100   SpO2: 98%    Weight:  107 kg (235 lb 3 7 oz)   Height:  5' 10" (1 778 m)     Temperature:   Temp (24hrs), Av 2 °F (36 2 °C), Min:95 9 °F (35 5 °C), Max:98 6 °F (37 °C)    Current:       Weights:   IBW: 73 kg  Body mass index is 33 75 kg/m²       Hemodynamic Monitoring:  N/A     Non-Invasive/Invasive Ventilation Settings:  Respiratory    Lab Data (Last 4 hours)    None         O2/Vent Data (Last 4 hours)      01/18 1946 01/18 2045         Vent Mode AC/VC AC/VC      Resp Rate (BPM) (BPM) 14 14      Vt (mL) (mL) 500 450      FIO2 (%) (%) 80 60      PEEP (cmH2O) (cmH2O) 5 5      MV  11 5                Lab Results   Component Value Date    PHART 7 414 01/18/2019    HBB0OHB 32 5 (L) 01/18/2019    PO2ART 103 1 01/18/2019    LBY9WAA 20 3 (L) 01/18/2019    BEART -3 3 01/18/2019    SOURCE Radial, Right 01/18/2019      SpO2: 98 %     Physical Exam:  Physical Exam   Constitutional: He appears well-developed and well-nourished  HENT:   Head: Normocephalic and atraumatic  Eyes: Pupils are equal, round, and reactive to light  Right eye exhibits no discharge  Left eye exhibits no discharge  Cardiovascular: Normal rate and regular rhythm  No murmur heard  Pulmonary/Chest: He has no wheezes  He has no rales  Patient is on mechanical ventilation  Lungs are clear bilaterally  Abdominal: Soft  Bowel sounds are normal  There is no guarding  There is a G-tube in place  Genitourinary:   Genitourinary Comments: Vogel catheter in place  Musculoskeletal: He exhibits no edema or deformity  Neurological:   Patient is sedated on propofol  RASS is -5  Patient does not react to sternal rub  He does withdraw to pain in all 4 extremities  Skin: Skin is warm and dry  Anterior neck incision is clean, dry, and intact  Closed with staples  Psychiatric:   Unable to assess secondary to intubation  Nursing note and vitals reviewed         Labs:    Results from last 7 days  Lab Units 01/18/19  0432 01/17/19  1933   WBC Thousand/uL 8 24 10 68*   HEMOGLOBIN g/dL 12 7 13 4   HEMATOCRIT % 36 9 38 6   PLATELETS Thousands/uL 210 247   NEUTROS PCT %  --  74   MONOS PCT %  --  10   MONO PCT % 2*  --       Results from last 7 days  Lab Units 01/18/19  0432 01/17/19 1933   SODIUM mmol/L 136 136   POTASSIUM mmol/L 4 1 3 6   CHLORIDE mmol/L 97* 96*   CO2 mmol/L 24 25   BUN mg/dL 19 19   CREATININE mg/dL 1 24 1 38*   CALCIUM mg/dL 9 1 9 9   ALK PHOS U/L  --  86   ALT U/L  --  15   AST U/L  --  16        Imaging:  Reviewed CT soft tissue neck from December 24th as well as January 17th  Also reviewed PET-CT from January 8th  As above, thyroid mass was demonstrated along with numerous metastatic lesions in the bones, lungs, and liver  I have personally reviewed pertinent films in PACS  EKG:  Not available for review  Assessment:     1  Upper airway obstruction secondary to 2    2  Anaplastic thyroid carcinoma with compression of the trachea, local invasion of the soft tissues of the neck including the esophagus, pulmonary metastases, osseous metastases, and liver metastasis  No oncologic treatment has been received thus far  3  Diabetes mellitus    4  Hypertension    5  GERD    6  Elevated TSH        Plan:                  Neuro:     -Delirium precautions, CAM-ICU  -Maintain propofol for sedation  At fentanyl drip for analgesia  Attempt to titrate propofol to achieve a RASS of -1  CV:     -No acute issues  Monitor blood pressure  Patient does not have any antihypertensives ordered at this time but his blood pressure has been stable  Lung:     -Will have ENT, Medical Oncology, and Radiation Oncology consulted to coordinate airway management strategy     -Continue with intubation for the foreseeable future, until patient receives radiation therapy and re-evaluation by ENT for tracheostomy  -Mechanical ventilation as above, AC 14/450/40/5  Maintain oxygen saturation greater than 92%  Respiratory protocol  GI:     -No acute issues  May initiate enteral feeds as patient has a PEG tube in place  Will have Nutrition evaluate and follow recommendations                   FEN:     -Place patient on maintenance fluids, Isolyte at 100 mL/hr     -Monitor I&O  -Check electrolytes and replete as needed     -As above, patient has a PEG tube in place  Consider initiating enteral feeds  :     -No acute issues  Vogel catheter in place  ID:     -No acute issues  Monitor for fever and trend WBC count  Heme/Onc:     -As above, patient has a known history of anaplastic thyroid carcinoma with extensive local invasion as well as distant metastases  Will consult Medical Oncology, Radiation Oncology, and ENT to coordinate a management strategy for the patient  -DVT prophylaxis with heparin  Endo:     -Check blood glucose every 6 hours  Maintain blood glucose under 180  If necessary, will start patient on sliding scale coverage       -Patient noted to have markedly elevated TSH of 99 44 to at [facility] - CLOS[facility]  Free T4 was not checked  Will check TSH with reflex T4 and treat accordingly  Msk/Skin:     -Local wound care for anterior neck incision     -Pressure ulcer prophylaxis  Disposition:  Continue ICU care  VTE Pharmacologic Prophylaxis: Heparin  VTE Mechanical Prophylaxis: sequential compression device     Invasive lines and devices: Invasive Devices     Peripheral Intravenous Line            Peripheral IV 01/17/19 Left Antecubital 1 day    Peripheral IV 01/18/19 Left Hand less than 1 day          Drain            Gastrostomy/Enterostomy Percutaneous endoscopic gastrostomy (PEG) 20 Fr  LUQ 21 days    Urethral Catheter Latex 16 Fr  less than 1 day          Airway            ETT  Cuffed;Oral;Straight; Inflated 8 mm less than 1 day                 Code Status: Level 1 - Full Code  POA:    POLST:       Given critical illness, patient length of stay will require greater than two midnights  Portions of the record may have been created with voice recognition software    Occasional wrong word or "sound a like" substitutions may have occurred due to the inherent limitations of voice recognition software  Read the chart carefully and recognize, using context, where substitutions have occurred          Porfirio Shone, MD

## 2019-01-19 NOTE — RESPIRATORY THERAPY NOTE
RT Protocol Note  Aditi Guillen 59 y o  male MRN: [id number]  Unit/Bed#: MICU 04 Encounter: [id number]    Assessment    Active Problems:    * No active hospital problems  *      Home Pulmonary Medications:  None       Past Medical History:   Diagnosis Date    Cancer ([facility] )     Diabetes mellitus ([facility] )     GERD (gastroesophageal reflux disease)     Hypertension      Social History     Social History    Marital status: /Civil Union     Spouse name: N/A    Number of children: N/A    Years of education: N/A     Social History Main Topics    Smoking status: Former Smoker    Smokeless tobacco: Never Used      Comment: Former chewing tobacco use    Alcohol use No    Drug use: No    Sexual activity: Not on file     Other Topics Concern    Not on file     Social History Narrative    No narrative on file       Subjective    Subjective Data: Pt is intubated    Objective    Physical Exam:   Assessment Type: Assess only  General Appearance: Sedated  Respiratory Pattern: Assisted  Chest Assessment: Chest expansion symmetrical  Bilateral Breath Sounds: Diminished, Clear  O2 Device: Vent    Vitals:  SpO2 98 %  Results from last 7 days  Lab Units 01/18/19  1341   PH ART  7 414   PCO2 ART mm Hg 32 5*   PO2 ART mm Hg 103 1   HCO3 ART mmol/L 20 3*   BASE EXC ART mmol/L -3 3   O2 CONTENT ART mL/dL 18 2   O2 HGB, ARTERIAL % 97 3*   ABG SOURCE  Radial, Right       Imaging and other studies: I have personally reviewed pertinent reports  O2 Device: Vent     Plan    Respiratory Plan: Vent/NIV/HFNC        Resp Comments: (P) Received pt VIA EMS from [facility]  Pt is intubated - placed pt on 840 with previous vent [address] settings  Resp protocol also initiated at this time  Pt admitted for tracheal obstruction due to metastatic thyroid cancer  Per chart pt does not have any pulmonary history  Per chart pt does not take any respiratory meds at home  Will continue to monitor under protocol

## 2019-01-19 NOTE — RESPIRATORY THERAPY NOTE
RT Ventilator Management Note  Nirali Wong 59 y o  male MRN: [id number]  [facility]: [id number]      Daily Screen     No data found  Physical Exam:   Assessment Type: Assess only  General Appearance: Sedated  Respiratory Pattern: Assisted  Chest Assessment: Chest expansion symmetrical  Bilateral Breath Sounds: Diminished, Clear  O2 Device: Vent  Subjective Data: Pt is intubated      Resp Comments: (P) Pt continues on ACVC - pt appears comfortable on current settings  No changes made to the vent overnight  Will continue to monitor

## 2019-01-19 NOTE — CONSULTS
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  He was seen initially in consultation as an outpatient by Dr Denise Howe, and the plan was to start radiation therapy on 1/21/19 at our [facility] facility  However, he recently presented to the Emergency Room with worsening shortness of breath secondary to increasing mass effect on the trachea from his primary neck mass  An emergent tracheostomy was attempted but aborted due to concerns regarding uncontrolled bleeding  He was left intubated for airway protection, and transferred to [facility] for initiation of emergent radiation therapy  Inpatient consult to Radiation Oncology  Consult performed by: Elida Burton  Consult ordered by: Ronel Hernandez          Review of Systems   Patient intubated  Unable to obtain other than details obtained from chart  Historical Information   Previous Oncology History: As per above HPI  Past Medical History:   Diagnosis Date    [facility]     [facility] mellitus [facility]     GERD (gastroesophageal reflux disease)     Hypertension      Past Surgical History:   Procedure Laterality Date    COLONOSCOPY      ESOPHAGOGASTRODUODENOSCOPY N/A 12/28/2018    Procedure: ESOPHAGOGASTRODUODENOSCOPY (EGD) with PEG placement;  Surgeon: Raj Nixon MD;  Location: [facility];   Service: Gastroenterology    FRACTURE SURGERY Bilateral 2008    b/l legs    HERNIA REPAIR      IR PORT PLACEMENT  1/17/2019    LEG SURGERY Bilateral     US GUIDED LYMPH NODE BIOPSY LEFT  12/24/2018    US GUIDED THYROID BIOPSY  12/24/2018       Family History   Problem Relation Age of Onset    Thyroid disease Mother     Prostate cancer Father      Social History   History   Alcohol Use No     History   Drug Use No     History   Smoking Status    Former Smoker   Smokeless Tobacco    Never Used     Comment: Former chewing tobacco use       Meds/Allergies   current meds:   Current Facility-Administered Medications   Medication Dose Route Frequency    chlorhexidine (PERIDEX) 0 12 % oral rinse 15 mL  15 mL Swish & Spit Q12H [facility]    fentaNYL 1250 mcg in sodium chloride 0 9% 125mL drip  50 mcg/hr Intravenous Continuous    fentanyl citrate (PF) 100 MCG/2ML 50 mcg  50 mcg Intravenous Q1H PRN    heparin (porcine) subcutaneous injection 5,000 Units  5,000 Units Subcutaneous Q8H [facility]    insulin lispro (HumaLOG) 100 units/mL subcutaneous injection 1-6 Units  1-6 Units Subcutaneous Q6H [facility]    multi-electrolyte (ISOLYTE-S PH 7 4 equivalent) IV solution  100 mL/hr Intravenous Continuous    pantoprazole (PROTONIX) injection 40 mg  40 mg Intravenous Q24H [facility]    propofol (DIPRIVAN) 1000 mg in 100 mL infusion (premix)  5-50 mcg/kg/min Intravenous Titrated       No Known Allergies    Objective     Intake/Output Summary (Last 24 hours) at 01/19/19 1232  Last data filed at 01/19/19 0753   Gross per 24 hour   Intake          1471 62 ml   Output              425 ml   Net          1046 62 ml     Invasive Devices     Peripheral Intravenous Line            Peripheral IV 01/17/19 Left Antecubital 1 day    Peripheral IV 01/18/19 Left Hand less than 1 day          Drain            Gastrostomy/Enterostomy Percutaneous endoscopic gastrostomy (PEG) 20 Fr  LUQ 21 days    Urethral Catheter Latex 16 Fr  less than 1 day          Airway            ETT  Cuffed;Oral;Straight; Inflated 8 mm 1 day              Physical Exam   The patient is examined at the bedside  Sedated, intubated  Firm palpable neck mass    Incision present from recent attempted tracheostomy over anterior neck, approximately 5 cm long, with staples in place  Lab Results: I have personally reviewed pertinent reports  Imaging Studies: I have personally reviewed pertinent films in PACS  EKG, Pathology, and Other Studies: I have personally reviewed pertinent reports  Assessment/Plan     Assessment and Plan:  Mr Jorge Gordon is a 59year old male with recently diagnosed metastatic anaplastic thyroid cancer, now admitted for airway compromise secondary to rapid progression of primary neck mass with tracheal compromise  Emergent tracheostomy was attempted but aborted, and the patient remains intubated for airway protection  The patient and his family are known to our Department as well as Medical Oncology  It has previously been explained that this is unfortunately not a curable cancer  The initial plan, to be performed as an outpatient, was to initiate a definitive-intent course of chemo-radiation therapy, delivered over 7 weeks  This was to begin on 1/21/19, with the goal of providing durable local control in order to protect his airway and cervical vasculature  At this point, today we will initiate a more palliative course of hypo-fractionated treatment, 3000 cGy in 10 fractions, with the goal of arresting further local progression, perhaps shrinking the tumor such that he may be extubated  The prognosis is extremely guarded, and anaplastic carcinoma has been known to grow through radiation therapy, so the possibility of eventual extubation remains uncertain  Dr Saran Solitario will further discuss with the family on Monday the goals of care and expectations         Code Status: Level 1 - Full Code

## 2019-01-19 NOTE — NUTRITION
In light of Propofol @ 23 8 ml/hr, recommend increase Jevity 1 2 by 10 ml q 4 hrs as esperanza to goal of 60 ml/hr + 1 PROSOURCE to provide qd:1440 ml,1788 Total Kcal,95 gm PRO,244 gm CHO,57 gm Fat,1162 ml Free H2O,1 6 mg CHO/kg/min  Monitor renal parameters

## 2019-01-19 NOTE — RESPIRATORY THERAPY NOTE
RT Ventilator Management Note  Ara Parsons 59 y o  male MRN: [id number]  Unit/Bed#: [facility] 04 Encounter: [id number]      Daily Screen       1/19/2019 0845             Patient safety screen outcome[de-identified] Failed    Not Ready for Weaning due to[de-identified] Underline problem not resolved;Going on Transport intubated            Physical Exam:   Assessment Type: Assess only  General Appearance: Sedated  Respiratory Pattern: Assisted, Normal  Chest Assessment: Chest expansion symmetrical  Bilateral Breath Sounds: Diminished, Clear  R Breath Sounds: Diminished, Clear  L Breath Sounds: Diminished, Clear  Cough: None  Suction: ET Tube  O2 Device: ventilator      Resp Comments: Pt tolerating current vent settings and appears to be resting comfortably  No vent changes at this time  Will continue to monitor and assess per respiratory protocol

## 2019-01-19 NOTE — CONSULTS
Consultation - Rigoberto Quan 59 y o  male MRN: [id number]    Unit/Bed#: [facility]U 04 Encounter: [id number]      Assessment/Plan     Assessment:  In summary, this is a 31-year-old male history of newly diagnosed advanced anaplastic thyroid cancer  Had seen Dr David Resendiz a week ago  Plan was made for radiation therapy with concomitant Adriamycin 10 mg/m2 per week followed by consolidative chemotherapy  The goal of treatment is palliative benefit, survival benefit  Data on this tumor type are scant and not derive from randomized clinical trials  Response rates are moderate  A small percentage of patients with this tumor type may harbor BRAF mutation, amenable to targeted therapy  This has not been assessed yet  Generally, evidence of response or progression in poorly differentiated tumors occurs over short spans of time  I reviewed the above with the patient's family  I reviewed the above with Dr Diamond Booth  Plan:  See above  History of Present Illness     HPI: Rigoberto Quan is a 59y o  year old male who presents with Trouble swallowing and difficulty breathing  Summer 2018 patient noticed change in voice quality  December 2018 noticed left lower neck discomfort  Who underwent ultrasound which showed a large thyroid mass  Concomitantly developed dysphagia  Biopsy of the left thyroid mass showed undifferentiated carcinoma, negative for TTF 1  CT of the chest showed multiple bilateral pulmonary lesions  PET-CT showed large thyroid mass invading the C7 vertebra as well as esophagus  Bilateral pulmonary lesions up to 1 8 cm, SUVs up to 29 0  Right liver lesion, SUV 14 3  Multiple bony lesions with hypermetabolism  Patient was admitted  Tracheostomy placement attempted but aborted  Patient was orally intubated, now on ventilator  Radiation therapy is planned  MUGA-ejection fraction 70%    WBC 10 3, hemoglobin 10 7, MCV 90, platelet count 997, normal differential   BMP showed creatinine 1 4, calcium 8 1   TSH 40 9  Magnesium 2 2, phosphorus 4 4  Free T4 0 3, depressed  Inpatient consult to Oncology  Consult performed by: Stevie Young ordered by: Devon Talbot          Review of Systems   Constitutional: Negative for chills and fever  HENT: Positive for trouble swallowing  Negative for nosebleeds  Eyes: Negative for discharge  Respiratory: Positive for shortness of breath  Negative for cough  Cardiovascular: Negative for chest pain  Gastrointestinal: Negative for abdominal pain, constipation and diarrhea  Endocrine: Negative for polydipsia  Genitourinary: Negative for hematuria  Musculoskeletal: Negative for arthralgias  Skin: Negative for color change  Allergic/Immunologic: Negative for immunocompromised state  Neurological: Negative for dizziness and headaches  Hematological: Negative for adenopathy  Psychiatric/Behavioral: Negative for agitation  Historical Information   Past Medical History:   Diagnosis Date    Cancer ([facility] )     Diabetes mellitus ([facility] )     GERD (gastroesophageal reflux disease)     Hypertension      Past Surgical History:   Procedure Laterality Date    COLONOSCOPY      ESOPHAGOGASTRODUODENOSCOPY N/A 12/28/2018    Procedure: ESOPHAGOGASTRODUODENOSCOPY (EGD) with PEG placement;  Surgeon: Tobi Ellis MD;  Location: [facility];   Service: Gastroenterology    FRACTURE SURGERY Bilateral 2008    b/l legs    HERNIA REPAIR      IR PORT PLACEMENT  1/17/2019    LEG SURGERY Bilateral     US GUIDED LYMPH NODE BIOPSY LEFT  12/24/2018    US GUIDED THYROID BIOPSY  12/24/2018     Social History   History   Alcohol Use No     History   Drug Use No     History   Smoking Status    Former Smoker   Smokeless Tobacco    Never Used     Comment: Former chewing tobacco use     Family History:   Family History   Problem Relation Age of Onset    Thyroid disease Mother     Prostate cancer Father        Meds/Allergies   all current active meds have been reviewed  No Known Allergies    Objective   Vitals: Blood pressure 134/72, pulse 56, temperature (!) 97 3 °F (36 3 °C), resp  rate 14, height 5' 10" (1 778 m), weight 107 kg (235 lb 14 3 oz), SpO2 94 %  Intake/Output Summary (Last 24 hours) at 01/19/19 1107  Last data filed at 01/19/19 0753   Gross per 24 hour   Intake          1471 62 ml   Output              425 ml   Net          1046 62 ml     Invasive Devices     Peripheral Intravenous Line            Peripheral IV 01/17/19 Left Antecubital 1 day    Peripheral IV 01/18/19 Left Hand less than 1 day          Drain            Gastrostomy/Enterostomy Percutaneous endoscopic gastrostomy (PEG) 20 Fr  LUQ 21 days    Urethral Catheter Latex 16 Fr  less than 1 day          Airway            ETT  Cuffed;Oral;Straight; Inflated 8 mm less than 1 day                Physical Exam   Constitutional: He is oriented to person, place, and time  He appears well-developed  Sedated, intubated  HENT:   Head: Normocephalic  Eyes: Pupils are equal, round, and reactive to light  Neck: Neck supple  Cardiovascular: Normal rate and regular rhythm  No murmur heard  Pulmonary/Chest: Breath sounds normal  He has no wheezes  He has no rales  Abdominal: Soft  There is no tenderness  Musculoskeletal: Normal range of motion  He exhibits no edema or tenderness  Lymphadenopathy:     He has no cervical adenopathy  Neurological: He is alert and oriented to person, place, and time  He has normal reflexes  No cranial nerve deficit  Skin: No rash noted  No erythema  Psychiatric: He has a normal mood and affect  His behavior is normal        Lab Results: I have personally reviewed pertinent reports  Imaging Studies: I have personally reviewed pertinent reports  EKG, Pathology, and Other Studies: I have personally reviewed pertinent reports          Code Status: Level 1 - Full Code  Advance Directive and Living Will:      Power of :    POLST:      Counseling / Coordination of Care  Total floor / unit time spent today 40 minutes  Greater than 50% of total time was spent with the patient and / or family counseling and / or coordination of care  A description of the counseling / coordination of care: see note

## 2019-01-20 NOTE — RESPIRATORY THERAPY NOTE
RT Ventilator Management Note  Fernandez Vázquez 59 y o  male MRN: [id number]  [facility] [id number]      Daily Screen       1/19/2019 0845 1/20/2019 0900          Patient safety screen outcome[de-identified] Failed Failed      Not Ready for Weaning due to[de-identified] Underline problem not resolved;Going on Transport intubated Underline problem not resolved              Physical Exam:   Assessment Type: Assess only  General Appearance: Sedated  Respiratory Pattern: Assisted, Normal  Chest Assessment: Chest expansion symmetrical  Bilateral Breath Sounds: Clear  R Breath Sounds: Clear  L Breath Sounds: Clear  Cough: None  Suction: ET Tube  O2 Device: ventilator      Resp Comments: Pt tolerating current vent settings and appears to be resting comfortably  Fio2 increased to 50% do to Spo2 at a sustained 90%  ETT bass changed  Will continue to monitor and assess per respiratory protocol

## 2019-01-20 NOTE — UTILIZATION REVIEW
[address]  Utilization Review Department  Phone: [phone]; Fax [phone]  [email or web address]  ATTENTION: Please call with any questions or concerns to [phone]  and carefully listen to the prompts so that you are directed to the right person  Send all requests for admission clinical reviews, approved or denied determinations and any other requests to fax [phone]  All voicemails are confidential     Initial Clinical Review    Admission: Date/Time/Statement: 1/18/19 @ 2044   Orders Placed This Encounter   Procedures    Inpatient Admission     Standing Status:   Standing     Number of Occurrences:   1     Order Specific Question:   Admitting Physician     Answer:   Asuncion Osborne [[id number]]     Order Specific Question:   Level of Care     Answer:   Critical Care [15]     Order Specific Question:   Estimated length of stay     Answer:   More than 2 Midnights     Order Specific Question:   Certification     Answer:   I certify that inpatient services are medically necessary for this patient for a duration of greater than two midnights  See H&P and MD Progress Notes for additional information about the patient's course of treatment  ED: Date/Time/Mode of Arrival: Tx from [facility] ICU (Admit 1/17-1/18)    Chief Complaint: Upper airway obstruction secondary to anaplastic thyroid carcinoma, unable to place tracheostomy     History of Illness: 59year old male with past medical history of diabetes mellitus non-insulin, hypertension, GERD, and 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 who presents as a transfer from [facility]  History is obtained from chart review as the patient is intubated and sedated  Per review of records, the patient presented to [facility] yesterday with complaints of a globus sensation and difficulty breathing    Patient was admitted and started on IV steroids  He was evaluated by ENT who recommended tracheostomy placement  The patient was intubated in the OR and a tracheostomy was attempted  Unfortunately, due to the patient's extensive tumor burden, a tracheostomy was unable to be successfully placed  Patient was transferred to this facility for radiation therapy with consideration of tracheostomy if the tumor burden is reduced  Patient appears to have been recently diagnosed with anaplastic thyroid carcinoma  Review of records shows a CT of the neck performed on December 24th which demonstrated significant enlargement of the left thyroid gland secondary to mass which was inseparable from the upper esophagus  Esophageal thickening was noted extending into the upper mediastinum  Enhancing adenopathy was noted in the neck as well as in the upper mediastinum inferior to the mass  There also were pulmonary nodules and a pleural based mass in the left apex noted, suspicious for metastases  Patient received a PET-CT which demonstrated the mass, pulmonary metastases, multiple osseous metastases, and a liver metastasis  Repeat CT of the neck was performed on January 17th and demonstrated interval increase in the size of the primary mass as well as lymphadenopathy and metastases    The patient has not received any oncologic treatment including radiation therapy or chemotherapy at this time      Vital Signs:   01/18 0701  01/19 0700 01/19 0701  01/19 2128    Most Recent    Temperature (°F) 95 998 7 97 398 2 98 2 (36 8)    Pulse 5474 5662 60    Respirations 1334 1314 14    Blood Pressure 114/63199/94 108/60154/74 108/60    Shock Index 0 360 51 0 360 56 0 56    SpO2 (%) 9399 9497 94        Pertinent Labs/Diagnostic Test Results:    PHART 7 414 01/18/2019     NTO6PVV 32 5 (L) 01/18/2019     PO2ART 103 1 01/18/2019     [id number] 20 3 (L) 01/18/2019     BEART -3 3 01/18/2019     SOURCE Radial, Right 01/18/2019     CL 97, Anion gap 15, Glucose 192    CXR 1/19-- Endotracheal tube tip at the level of the thoracic inlet, and should be advanced 5 cm into the trachea  Left basilar opacity consistent with small pleural effusion and consolidation due to atelectasis or pneumonia   Mild right basilar consolidation also noted  Past Medical/Surgical History: Active Ambulatory Problems     Diagnosis Date Noted    Oropharyngeal dysphagia 12/27/2018    Type 2 diabetes mellitus without complication, without long-term current use of insulin [facility] 12/27/2018    Essential hypertension 12/27/2018    [facility] to excess calories [facility] 12/27/2018    Gastroesophageal reflux disease without esophagitis 12/27/2018    Primary cancer of thyroid with metastasis to other site [facility]) 12/27/2018    Difficulty in swallowing 12/27/2018    Elevated TSH 01/18/2019    Accelerated hypertension 01/18/2019    Tracheal obstruction 01/18/2019    CKD (chronic kidney disease) stage 3, GFR 30-59 ml/min ([facility]) 01/18/2019    Thyroid cancer [facility] ) 01/17/2019     Past Medical History:   Diagnosis Date    Cancer ([place] [address] )     Diabetes mellitus ([place] [address] )     GERD (gastroesophageal reflux disease)     Hypertension      Admitting Diagnosis: Tracheal obstruction [J39 8]  Age/Sex: 59 y o  male     Assessment/Plan:   Acute hypoxic respiratory failure secondary to airway/tracheal obstruction due to thyroid cancer with failed attempted tracheostomy 01/18/2019  Anaplastic Thyroid cancer with metastatic disease  Hypertension  CKD stage 3  Diabetes mellitus type 2 with insulin use and elevated hemoglobin A1c of 8 7  GERD    Plan:  Patient arrived to [facility] intubated appear to be comfortable with sedation  Off sedation patient will move all extremities and follows commands  Continue with sedation with goal RASS of -1 and continue with ventilatory support  Plan for radiation oncology evaluation in the a   with treatment tomorrow  Continue patient's steroids as per ENT    Plan to discuss with ENT if further attempts will be made a tracheostomy status post radiation treatment      Maintain systolic blood pressure less than 160  Labetalol to be administered as needed  Monitor blood glucose levels q 6 hours  Insulin sliding scale for glycemic control  Patient's creatinine appears to be at baseline  Continue to monitor laboratory data as well as urinary output        Admission Orders:  Scheduled Meds:   Current Facility-Administered Medications:  chlorhexidine 15 mL Swish & Spit Q12H [facility]    fentaNYL 50 mcg/hr Intravenous Continuous Last Rate: 50 mcg/hr (01/19/19 1959)   fentanyl citrate (PF) 50 mcg Intravenous Q1H PRN    heparin (porcine) 5,000 Units Subcutaneous Q8H [facility]    [START ON 1/20/2019] insulin glargine 25 Units Subcutaneous QAM    insulin lispro 2-12 Units Subcutaneous Q6H [facility]    multi-electrolyte 100 mL/hr Intravenous Continuous Last Rate: 100 mL/hr (01/19/19 1657)   pantoprazole 40 mg Intravenous Q24H [facility]    propofol 5-50 mcg/kg/min Intravenous Titrated Last Rate: 40 mcg/kg/min (01/19/19 1959)       Admit to MICU  Vent 15/5, FiO2 80%  Telem  Vogel cath  SCD's  Npo  Fingerstick glucose checks q6h  Consult ENT, oncology, radiation oncology      Radiation oncology consult:  Assessment and Plan:  Mr Gustavo Velasquez is a 59year old male with recently diagnosed metastatic anaplastic thyroid cancer, now admitted for airway compromise secondary to rapid progression of primary neck mass with tracheal compromise  Emergent tracheostomy was attempted but aborted, and the patient remains intubated for airway protection  The patient and his family are known to our Department as well as Medical Oncology  It has previously been explained that this is unfortunately not a curable cancer  The initial plan, to be performed as an outpatient, was to initiate a definitive-intent course of chemo-radiation therapy, delivered over 7 weeks   This was to begin on 1/21/19, with the goal of providing durable local control in order to protect his airway and cervical vasculature  At this point, today we will initiate a more palliative course of hypo-fractionated treatment, 3000 cGy in 10 fractions, with the goal of arresting further local progression, perhaps shrinking the tumor such that he may be extubated  The prognosis is extremely guarded, and anaplastic carcinoma has been known to grow through radiation therapy, so the possibility of eventual extubation remains uncertain  Dr Live Sanchez will further discuss with the family on Monday the goals of care and expectations

## 2019-01-20 NOTE — PROGRESS NOTES
Progress Note - Critical Care   Antonio Trejo 59 y o  male MRN: [id number]  Unit/Bed#: MICU 04 Encounter: [id number]    Attending Physician: Cleo Ramires, DO  ______________________________________________________________________  Assessment and Plan:   Principal Problem:    Airway obstruction  Active Problems:    Type 2 diabetes mellitus without complication, without long-term current use of insulin ([facility] )    Essential hypertension    Gastroesophageal reflux disease without esophagitis    Primary cancer of thyroid with metastasis to other site [facility])    Difficulty in swallowing    Elevated TSH    CKD (chronic kidney disease) stage 3, GFR 30-59 ml/min ([facility])  Resolved Problems:    * No resolved hospital problems  *        Neuro:  Continue routine neuro checks and delirium precautions  Continues on fentanyl infusion and propofol for sedation  Consider starting Precedex and weaning off propofol  Continue fentanyl p r n          CV:  History of hypertension  SBP ranging from 108 to 176  Continue telemetry monitoring  Continue to hold home aspirin, Lotrel, and clonidine  Will restart home lopressor at half dose: 50 mg bid       Pulm: Tolerating AC vent settings, Can attempt to wean on the ventilator today with no plan for extubation due to tumor burden and plan to continue radiation therapy daily for few treatments prior to trial extubation  Continue VAP precautions      GI:  Tolerating tube feeds, goal adjusted per nutritional recommendations  Patient with a history of GERD: Continue Protonix daily       :   creatinine at baseline, hypokalemia:  Replacing today  Continue close monitoring of renal function, I/O, and electrolytes daily and replace as needed  Tube feeds are at goal, will stop IV fluids      ID: Afebrile, no leukocytosis    No signs of acute infection      Heme:   hemoglobin adequate, continue subcu heparin for DVT prophylaxis   Patient recently diagnosed with anaplastic thyroid cancer with extensive local invasion, trach placement was attempted on 01/18/2019 but was unable to be placed either due to the extent of the tumor burden  Appreciate Radiation Oncology and Oncology input  Started on radiation therapy 01/19/2019 and plan to continue daily  Starting chemotherapy with Adriamycin 10 mg/m twice weekly  Chemo radiation is palliative and goal is to hopefully get him extubated      Endo:   insulin-dependent diabetic with an A1c of 8 7 in December 2018, blood sugars ranging from 200-259  Currently on home Lantus of 25 units q a m , this morning will be his 1st dose of his home dose, also on algorithm for sliding scale insulin q 6 hours  Will continue current insulin coverage for now and assess insulin needs today with his home dose Lantus on  Msk/Skin:   frequent repositioning to offload pressure points, will need physical therapy when medically appropriate     Disposition:   keep in ICU status    Code Status: Level 1 - Full Code    Counseling / Coordination of Care  Total Critical Care time spent 36 minutes excluding procedures, teaching and family updates  ______________________________________________________________________    Chief Complaint:  Unable to fully assess due to intubation but patient does deny pain on exam    24 Hour Events:  Overnight fentanyl infusion was increased to 75 an hour due to agitation, no other issues overnight  Review of Systems   Unable to perform ROS: Intubated   ______________________________________________________________________    Physical Exam:   Physical Exam   Constitutional:   Acute chronically ill-appearing male intubated and lying in bed on exam   HENT:   Head: Normocephalic and atraumatic  Eyes: Pupils are equal, round, and reactive to light  Conjunctivae and EOM are normal    Neck: Normal range of motion  Neck supple  Cardiovascular: Normal rate, regular rhythm, normal heart sounds and intact distal pulses    Exam reveals no gallop and no friction rub  No murmur heard  Heart rate 72 beats per minute   Pulmonary/Chest: Breath sounds normal  No respiratory distress  He has no wheezes  On AC 14/450/40/5 with peak pressure of 20, mean pressure of 8 2, plateau pressure of 13 and SpO2 98%  Lung sounds clear to auscultation anteriorly bilateral   Abdominal: Soft  Bowel sounds are normal  He exhibits no distension  There is no tenderness  Genitourinary:   Genitourinary Comments: Vogel intact   Musculoskeletal: Normal range of motion  He exhibits no edema or deformity  Neurological:   Sedated on propofol and fentanyl infusions, pupils 3 mm bilateral and briskly reactive to light  Awakening to voice and following commands with all extremities, nodding appropriately to simple questions   Skin: Skin is warm and dry  Nursing note and vitals reviewed  ______________________________________________________________________  Vitals:    19 0100 19 0130 19 0200 19 0343   BP: (!) 176/83 156/73 163/75    Pulse: 80 78 74    Resp:       Temp:       TempSrc:       SpO2: 94% 95% 93% 94%   Weight:       Height:           Temperature:   Temp (24hrs), Av 9 °F (36 6 °C), Min:97 3 °F (36 3 °C), Max:98 2 °F (36 8 °C)    Current Temperature: 98 2 °F (36 8 °C)  Weights:   IBW: 73 kg    Body mass index is 33 85 kg/m²    Weight (last 2 days)     Date/Time   Weight    19 0400  107 (235 89)    19 2100  107 (235 23)               Non-Invasive/Invasive Ventilation Settings:  Respiratory    Lab Data (Last 4 hours)    None         O2/Vent Data (Last 4 hours)       0343           Vent Mode AC/VC       Resp Rate (BPM) (BPM) 14       Vt (mL) (mL) 450       FIO2 (%) (%) 40       PEEP (cmH2O) (cmH2O) 5       MV 8 5                 SpO2: SpO2: 94 %  Intake and Outputs:  I/O        07 0700    I V  (mL/kg) 1143 1 (10 7) 2547 5 (23 8)    NG/GT 90 50    Feedings  290    Total Intake(mL/kg) 1233 1 (11 5) 2887 5 (27)    Urine (mL/kg/hr) 325 1035 (0 4)    Total Output 325 1035    Net +908 1 +1852 5          Unmeasured Stool Occurrence 0 x         UOP:  ml/hr   Nutrition:        Diet Orders            Start     Ordered    01/19/19 1328  Diet Enteral/Parenteral; Tube Feeding No Oral Diet; Jevity 1 2 Rip; Continuous; 50  Diet effective now     Comments:  Start at 10 ml/hr and advance to goal as tolerated   Question Answer Comment   Diet Type Enteral/Parenteral    Enteral/Parenteral Tube Feeding No Oral Diet    Tube Feeding Formula: Jevity 1 2 Rip    Bolus/Cyclic/Continuous Continuous    Tube Feeding Goal Rate (mL/hr): 50    RD to adjust diet per protocol? Yes        01/19/19 1329        TF currently running at 50 ml/hr with a goal of 50   Formula:  Jevity 1 2  Labs:     Results from last 7 days  Lab Units 01/20/19  0529 01/19/19  0500 01/18/19  2255 01/18/19  0432 01/17/19  1933   WBC Thousand/uL 6 96 10 33*  --  8 24 10 68*   HEMOGLOBIN g/dL 12 0 10 7*  --  12 7 13 4   HEMATOCRIT % 34 9* 30 8*  --  36 9 38 6   PLATELETS Thousands/uL 164 202 218 210 247   NEUTROS PCT %  --  83*  --   --  74   BANDS PCT %  --   --   --  1  --    MONOS PCT %  --  6  --   --  10   MONO PCT %  --   --   --  2*  --        Results from last 7 days  Lab Units 01/20/19  0529 01/19/19  0500 01/18/19  0432 01/17/19  1933   SODIUM mmol/L 132* 134* 136 136   POTASSIUM mmol/L 3 8 4 1 4 1 3 6   CHLORIDE mmol/L 99* 99* 97* 96*   CO2 mmol/L 25 20* 24 25   ANION GAP mmol/L 8 15* 15* 15*   BUN mg/dL 25 29* 19 19   CREATININE mg/dL 1 33* 1 43* 1 24 1 38*   CALCIUM mg/dL 7 9* 8 1* 9 1 9 9   ALT U/L  --   --   --  15   AST U/L  --   --   --  16   ALK PHOS U/L  --   --   --  86   ALBUMIN g/dL  --   --   --  3 3*   TOTAL BILIRUBIN mg/dL  --   --   --  1 19*       Results from last 7 days  Lab Units 01/20/19  0529 01/19/19  0500   MAGNESIUM mg/dL 2 2 2 2   PHOSPHORUS mg/dL 2 3 4 4*        Imaging:  I have personally reviewed pertinent reports  Micro: Allergies: No Known Allergies  Medications:   Scheduled Meds:  Current Facility-Administered Medications:  chlorhexidine 15 mL Swish & Spit Q12H [facility] Mellisa Andersen MD    fentaNYL 75 mcg/hr Intravenous Continuous Sheila Correia MD Last Rate: 75 mcg/hr (01/20/19 0546)   fentanyl citrate (PF) 50 mcg Intravenous Q1H PRN Sheila Correia MD    heparin (porcine) 5,000 Units Subcutaneous [facility] MD    insulin glargine 25 Units Subcutaneous QAM JETHRO Kamara    insulin lispro 2-12 Units Subcutaneous Q6H [facility] JETHRO Kamara    multi-electrolyte 50 mL/hr Intravenous Continuous Sheila Correia MD Last Rate: 50 mL/hr (01/20/19 0458)   pantoprazole 40 mg Intravenous Q24H [facility] Mellisa Andersen MD    potassium chloride 20 mEq Oral Once JETHRO Kamara    propofol 5-50 mcg/kg/min Intravenous Titrated Mellisa Andersen MD Last Rate: 50 mcg/kg/min (01/20/19 0518)     Continuous Infusions:  fentaNYL 75 mcg/hr Last Rate: 75 mcg/hr (01/20/19 0546)   multi-electrolyte 50 mL/hr Last Rate: 50 mL/hr (01/20/19 0458)   propofol 5-50 mcg/kg/min Last Rate: 50 mcg/kg/min (01/20/19 0518)     PRN Meds:    fentanyl citrate (PF) 50 mcg Q1H PRN     VTE Pharmacologic Prophylaxis: Heparin  VTE Mechanical Prophylaxis: sequential compression device  Invasive lines and devices: Invasive Devices     Peripheral Intravenous Line            Peripheral IV 01/17/19 Left Antecubital 2 days    Peripheral IV 01/18/19 Left Hand 1 day          Drain            Gastrostomy/Enterostomy Percutaneous endoscopic gastrostomy (PEG) 20 Fr  LUQ 22 days    Urethral Catheter Latex 16 Fr  1 day          Airway            ETT  Cuffed;Oral;Straight; Inflated 8 mm 1 day                     Portions of the record may have been created with voice recognition software  Occasional wrong word or "sound a like" substitutions may have occurred due to the inherent limitations of voice recognition software    Read the chart carefully and recognize, using context, where substitutions have occurred      Solitario Resendiz

## 2019-01-20 NOTE — UTILIZATION REVIEW
Notification of Inpatient Admission/Inpatient Authorization Request  This is a Notification of Inpatient Admission/Request for Inpatient Authorization for our facility [place] [address]  Be advised that this patient was admitted to our facility under Inpatient Status  Please contact the Utilization Review Department where the patient is receiving care services for additional admission information  Place of Service Code: 24   Place of Service Name: [facility]  Presentation Date & Time: 1/18/2019  8:44 PM  Inpatient Admission Date & Time: 1/18/19 2044  Discharge Date & Time: No discharge date for patient encounter  Discharge Disposition (if discharged): [address] [place] [address]  Attending Physician: Cleo Ramires Do [[id number]]    Admission Orders     Ordered        01/18/19 2126  Inpatient Admission  Once             Facility: [address])  Address: [address] [place] [address]  Phone: [phone] Tax ID: [id number]  NPI: [id number]  Medicare ID: [id number]    [address] Utilization Review Department  Phone: [phone]; Fax [phone]  ATTENTION: Please call with any questions or concerns to [phone]  and carefully listen to the prompts so that you are directed to the right person  Send all requests for admission clinical reviews, approved or denied determinations and any other requests to fax [phone]   All voicemails are confidential

## 2019-01-21 NOTE — RESPIRATORY THERAPY NOTE
RT Ventilator Management Note  Kirill Phillips 59 y o  male MRN: [id number]  Unit/Bed#: MICU 04 Encounter: [id number]      Daily Screen       1/19/2019 0845 1/20/2019 0900          Patient safety screen outcome[de-identified] Failed Failed      Not Ready for Weaning due to[de-identified] Underline problem not resolved;Going on Transport intubated Underline problem not resolved              Physical Exam:   Assessment Type: Assess only  General Appearance: Sedated  Respiratory Pattern: Assisted  Chest Assessment: Chest expansion symmetrical  Bilateral Breath Sounds: Diminished  O2 Device: (P) vent      Resp Comments: (P) transported pt to and from radiation treatment without any incidents, placed pt back on [facility] settings upon return no distress noted

## 2019-01-21 NOTE — PROGRESS NOTES
Progress Note - Gunnar Brown 59 y o  male MRN: [id number]    Unit/Bed#: MICU 04 Encounter: [id number]      Assessment:  In summary, this is a 78-year-old male history of advanced anaplastic thyroid cancer  Radiation therapy was initiated  Adriamycin to be administered today  This was deferred due to uncertainty about access which was ultimately established  Plan:  See above  Subjective:   Clinically stable  Sedated  Objective:     Vitals: Blood pressure 165/79, pulse 72, temperature 99 °F (37 2 °C), temperature source Oral, resp  rate 16, height 5' 10" (1 778 m), weight 115 kg (254 lb 6 6 oz), SpO2 94 %  ,Body mass index is 36 5 kg/m²  Intake/Output Summary (Last 24 hours) at 01/21/19 1706  Last data filed at 01/21/19 1701   Gross per 24 hour   Intake          2458 48 ml   Output              925 ml   Net          1533 48 ml       Physical Exam:   General Appearance:    Intubated and sedated  Eyes:    PERRL   Ears:    Normal external ear canals, both ears   Nose:   Nares normal, septum midline   Throat:   Mucosa moist  Pharynx without injection  Neck:   Supple       Lungs:     Clear to auscultation bilaterally   Chest Wall:    No tenderness or deformity    Heart:    Regular rate and rhythm       Abdomen:     Soft, non-tender, bowel sounds +, no organomegaly           Extremities:   Extremities no cyanosis or edema       Skin:   no rash or icterus      Lymph nodes:   Cervical, supraclavicular, and axillary nodes normal   Neurologic:   CNII-XII intact, normal strength, sensation and reflexes     throughout        Invasive Devices     Central Venous Catheter Line            Port A Cath 01/21/19 Right Chest less than 1 day          Peripheral Intravenous Line            Peripheral IV 01/17/19 Left Antecubital 3 days    Peripheral IV 01/18/19 Left Hand 3 days          Drain            Gastrostomy/Enterostomy Percutaneous endoscopic gastrostomy (PEG) 20 Fr  LUQ 24 days          Airway ETT  Cuffed;Oral;Straight; Inflated 8 mm 3 days                Lab, Imaging and other studies: I have personally reviewed pertinent reports

## 2019-01-21 NOTE — PROGRESS NOTES
Progress Note - Critical Care   Juan José Flanagan 59 y o  male MRN: [id number]  Unit/Bed#: MICU 04 Encounter: [id number]    Assessment:   1  Metastatic papillary anaplastic thyroid cancer  2  Airway obstruction  3  Acute hypoxic respiratory failure  4  ELBERT on CKD  5  DM2  6  Essential hypertension    Plan:   Neuro:   · Analgesia/sedation: precedex at 1 0 mcg/kg/hr, fentanyl at 75 mcg/hr  Fentanyl 50 mcg q1 prn (1 dose/24 hours)  · Daily CAM-ICU, delirium precautions  · Regulate sleep/wake cycle    CV:   · Lopressor 50mg BID (home dose 100mg BID)  · Hold home Lotrel/Clonidine    Lung:   · Maintain mechanical ventilation 14/450/40%/5  Daily SBT though no plan to extubate given tumor burden  VAP bundle  · Pulmonary toilet    GI:   · Daily protonix  · Start bowel regimen    FEN:   · No maintenance fluids  · Replete electrolytes for goal Mag >2 0, Phos >3 0, K >4 0  · Tube feeds at goal of 60cc/hr, will recalculate recommendations now that propofol has been discontinued    :   · Discontinue joiner  · Monitor I/Os    ID:   · Monitor fever curve/white count    Heme/Onc:   · Radiation/chemotherapy per oncology  · Adriamycin planned via port  · SQH/SCDs for ppx    Endo:   · Insulin gtt, algorithm 3    Msk/Skin:   · Frequent turns and repositioning    Disposition:   · ICU    ______________________________________________________________________  Chief Complaint: Intubated/sedated    HPI/24hr events: No acute events overnight  Transitioned from propofol to precedex    ______________________________________________________________________  Temperature:   Temp (24hrs), Av 6 °F (37 6 °C), Min:97 9 °F (36 6 °C), Max:100 8 °F (38 2 °C)    Current Temperature: 98 1 °F (36 7 °C)    Vitals:    19 0422 19 0451 19 0522 19 0718   BP: 153/76  119/62    Pulse: 74  70    Resp:       Temp:       TempSrc:       SpO2: 95%  92% 93%   Weight:  115 kg (254 lb 6 6 oz)     Height:                  Weights:   IBW: 73 kg Body mass index is 36 5 kg/m²  Weight (last 2 days)     Date/Time   Weight    01/21/19 0451  115 (254 41)    01/20/19 1401  107 (235 89)    01/19/19 0400  107 (235 89)            Height: 5' 10" (177 8 cm)    Ventilator Settings:   Vent Mode: AC/VC  Resp Rate (BPM): 14 BPM  Vt (mL): 450 mL  FIO2 (%): 50 %  PEEP (cmH2O): 5 cmH2O  SpO2: 93 %    No results found for: PHART, KFF1QRK, PO2ART, UVE3EBO, P2GRWIHR, BEART, SOURCE  SpO2: SpO2: 93 %    ______________________________________________________________________  Physical Exam:  [place] Agitation Sedation Scale (RASS): Restless  Physical Exam   Constitutional: Vital signs are normal  He appears well-developed and well-nourished  He is cooperative  No distress  He is sedated, intubated and restrained  HENT:   Head: Normocephalic and atraumatic  Eyes: Pupils are equal, round, and reactive to light  Neck:   Surgical incision on anterior neck C/D/I with staples  Cardiovascular: Normal rate, regular rhythm and normal heart sounds  Pulses:       Radial pulses are 2+ on the right side, and 2+ on the left side  Dorsalis pedis pulses are 2+ on the right side, and 2+ on the left side  Pulmonary/Chest: No accessory muscle usage  He is intubated  No respiratory distress  Abdominal: Soft  He exhibits no distension  There is no tenderness  Genitourinary:   Genitourinary Comments: Vogel present   Musculoskeletal:   No peripheral edema   Neurological: GCS eye subscore is 4  GCS verbal subscore is 1  GCS motor subscore is 6  Skin: Skin is dry and intact   He is not diaphoretic      ______________________________________________________________________  Intake and Outputs:  I/O       01/19 0701 - 01/20 0700 01/20 0701 - 01/21 0700 01/21 0701 - 01/22 0700    I V  (mL/kg) 2676 1 (25) 1882 5 (16 4)     NG/GT 50 30     Feedings 452 1334     Total Intake(mL/kg) 3178 1 (29 7) 3246 5 (28 2)     Urine (mL/kg/hr) 1335 (0 5) 1195 (0 4)     Total Output 1335 1195 Net +1843 1 +2051 5                 UOP: 0 4cc/kg/hour   Nutrition:        Diet Orders            Start     Ordered    01/20/19 0715  Diet Enteral/Parenteral; Tube Feeding No Oral Diet; Jevity 1 2 Rip; Continuous; 60; Prosource Protein Liquid - One Packet  Diet effective now     Comments:  Start at 10 ml/hr and advance to goal as tolerated   Question Answer Comment   Diet Type Enteral/Parenteral    Enteral/Parenteral Tube Feeding No Oral Diet    Tube Feeding Formula: Jevity 1 2 Rip    Bolus/Cyclic/Continuous Continuous    Tube Feeding Goal Rate (mL/hr): 60    Prosource Protein Liquid - No Carb Prosource Protein Liquid - One Packet    RD to adjust diet per protocol? Yes        01/20/19 0714        Formula: Jevity 1 2, currently running at 60ml/hr, with a goal of 60ml/hr  Labs:     Results from last 7 days  Lab Units 01/21/19 0423 01/20/19  0529 01/19/19  0500  01/18/19  0432 01/17/19  1933   WBC Thousand/uL 8 37 6 96 10 33*  --  8 24 10 68*   HEMOGLOBIN g/dL 10 9* 12 0 10 7*  --  12 7 13 4   HEMATOCRIT % 32 4* 34 9* 30 8*  --  36 9 38 6   PLATELETS Thousands/uL 144* 164 202  < > 210 247   NEUTROS PCT %  --   --  83*  --   --  74   MONOS PCT %  --   --  6  --   --  10   MONO PCT %  --   --   --   --  2*  --    < > = values in this interval not displayed  Results from last 7 days  Lab Units 01/21/19 0423 01/20/19  0529 01/19/19  0500  01/17/19  1933   POTASSIUM mmol/L 3 6 3 8 4 1  < > 3 6   CHLORIDE mmol/L 101 99* 99*  < > 96*   CO2 mmol/L 30 25 20*  < > 25   BUN mg/dL 20 25 29*  < > 19   CREATININE mg/dL 1 38* 1 33* 1 43*  < > 1 38*   CALCIUM mg/dL 7 9* 7 9* 8 1*  < > 9 9   ALK PHOS U/L  --   --   --   --  86   ALT U/L  --   --   --   --  15   AST U/L  --   --   --   --  16   < > = values in this interval not displayed      Results from last 7 days  Lab Units 01/21/19  0423 01/20/19  0529 01/19/19  0500   MAGNESIUM mg/dL 2 2 2 2 2 2       Results from last 7 days  Lab Units 01/20/19  0529 01/19/19  0500 PHOSPHORUS mg/dL 2 3 4 4*              No results found for: TROPONINI  Imaging: No new imaging I have personally reviewed pertinent reports  Allergies: No Known Allergies  Medications:   Scheduled Meds:    Current Facility-Administered Medications:  chlorhexidine 15 mL Swish & Spit Q12H [facility] Gabe Castro MD    dexmedetomidine 0 1-1 5 mcg/kg/hr Intravenous Titrated Tessy Miranda MD Last Rate: 1 mcg/kg/hr (01/21/19 0730)   fentaNYL 75 mcg/hr Intravenous Continuous Tessy Miranda MD Last Rate: 75 mcg/hr (01/20/19 1708)   fentanyl citrate (PF) 50 mcg Intravenous Q1H PRN Tessy Miranda MD    heparin (porcine) 5,000 Units Subcutaneous [facility] Gabe Castro MD    insulin regular (HumuLIN R,NovoLIN R) infusion 0 3-21 Units/hr Intravenous Titrated JETHRO Hansen Last Rate: 8 Units/hr (01/21/19 9037)   metoprolol tartrate 50 mg Oral Q12H [facility] JETHRO Jacobson    pantoprazole 40 mg Intravenous Q24H [facility] Gabe Castro MD    potassium chloride 40 mEq Oral Once Nabila Ruiz PA-C    propofol 5-50 mcg/kg/min Intravenous Titrated Gabe Castro MD Last Rate: Stopped (01/21/19 0602)   senna-docusate sodium 1 tablet Oral HS Nabila Ruiz PA-C      Continuous Infusions:    dexmedetomidine 0 1-1 5 mcg/kg/hr Last Rate: 1 mcg/kg/hr (01/21/19 0730)   fentaNYL 75 mcg/hr Last Rate: 75 mcg/hr (01/20/19 1708)   insulin regular (HumuLIN R,NovoLIN R) infusion 0 3-21 Units/hr Last Rate: 8 Units/hr (01/21/19 4417)   propofol 5-50 mcg/kg/min Last Rate: Stopped (01/21/19 0602)     PRN Meds:    fentanyl citrate (PF) 50 mcg Q1H PRN     VTE Pharmacologic Prophylaxis:   Pharmacologic: Heparin  Mechanical VTE Prophylaxis in Place: Yes    Invasive lines and devices:   Invasive Devices     Peripheral Intravenous Line            Peripheral IV 01/17/19 Left Antecubital 3 days    Peripheral IV 01/18/19 Left Hand 2 days          Drain            Gastrostomy/Enterostomy Percutaneous endoscopic gastrostomy (PEG) 20 Fr  LUQ 23 days    Urethral Catheter Latex 16 Fr  2 days          Airway            ETT  Cuffed;Oral;Straight; Inflated 8 mm 2 days                   Counseling / Coordination of Care  Total Critical Care time spent 0 minutes excluding procedures, teaching and family updates        Code Status: Level 1 - Full Code      Aretha Godoy PA-C

## 2019-01-21 NOTE — RESPIRATORY THERAPY NOTE
RT Ventilator Management Note  Mir Hernandez 59 y o  male MRN: [id number]  [facility] [id number]      Daily Screen       1/19/2019 0845 1/20/2019 0900          Patient safety screen outcome[de-identified] Failed Failed      Not Ready for Weaning due to[de-identified] Underline problem not resolved;Going on Transport intubated Underline problem not resolved              Physical Exam:   Assessment Type: Assess only  General Appearance: Sedated  Respiratory Pattern: Assisted  Chest Assessment: Chest expansion symmetrical  Bilateral Breath Sounds: Diminished  R Breath Sounds: Diminished, Clear  L Breath Sounds: Diminished, Clear  Suction: ET Tube  O2 Device: vent      Resp Comments: pt cont to esperanza current AC settings noother changes at this time will cont to monitor

## 2019-01-21 NOTE — RESPIRATORY THERAPY NOTE
RT Ventilator Management Note  Sisi Alarcon 59 y o  male MRN: [id number]  Unit/Bed#: MICU 04 Encounter: [id number]      Daily Screen       1/19/2019 0845 1/20/2019 0900          Patient safety screen outcome[de-identified] Failed Failed      Not Ready for Weaning due to[de-identified] Underline problem not resolved;Going on Transport intubated Underline problem not resolved              Physical Exam:   Assessment Type: Assess only  General Appearance: Sedated  Respiratory Pattern: Assisted  Chest Assessment: Chest expansion symmetrical  Bilateral Breath Sounds: Diminished  R Breath Sounds: Diminished, Clear  L Breath Sounds: Diminished, Clear  Suction: ET Tube  O2 Device: vent      Resp Comments: pt became tachypneic and decreased Sats so I placed back on [facility]

## 2019-01-21 NOTE — PROGRESS NOTES
Pt was scheduled for initial oncology nutrition consult @ 1:15pm today prior to new start RT visit scheduled 2:15pm today at [facility]; noted that pt has been hospitalized since 1/17/19 and will begin treatment as an inpatient @ [facility] today 1/21/19  This RD discussed patient's nutrition care with inpatient dietitian Cm Vargas who is currently managing patient's nutrition care plan while he is admitted  Pt's wife Tigre Villegas called to confirm today's appt cancellation   I informed Tigre Villegas that I was in touch in with inpatient RDs re: patient's nutrition care and informed Tigre Villegas I will remain available to provide nutrition counseling/advice to pt/family following patient's discharge home from hospital

## 2019-01-21 NOTE — PROGRESS NOTES
I updated the patient's wife and daughter at the bedside  He remains intubated  Obtained XRT today and adriamycin  Will discuss reimaging later this week to determine if tumor response is being achieved  All questions answered      Nadira Cody, DO

## 2019-01-21 NOTE — RESPIRATORY THERAPY NOTE
RT Ventilator Management Note  John Anthony 59 y o  male MRN: [id number]  [facility] [id number]      Daily Screen       1/19/2019 0845 1/20/2019 0900          Patient safety screen outcome[de-identified] Failed Failed      Not Ready for Weaning due to[de-identified] Underline problem not resolved;Going on Transport intubated Underline problem not resolved              Physical Exam:   Assessment Type: Assess only  General Appearance: Sedated  Respiratory Pattern: Assisted  Chest Assessment: Chest expansion symmetrical  Bilateral Breath Sounds: Diminished, Clear  R Breath Sounds: Diminished, Clear  L Breath Sounds: Diminished, Clear  Cough: None  Suction: ET Tube      Resp Comments: Pt continues on ACVC - pt appears comfortable on current settings  No changes made to the vent at this time  Will continue to monitor

## 2019-01-21 NOTE — RESPIRATORY THERAPY NOTE
RT Ventilator Management Note  Jory Red 59 y o  male MRN: [id number]  Unit/Bed#: MICU 04 Encounter: [id number]      Daily Screen       1/19/2019 0845 1/20/2019 0900          Patient safety screen outcome[de-identified] Failed Failed      Not Ready for Weaning due to[de-identified] Underline problem not resolved;Going on Transport intubated Underline problem not resolved              Physical Exam:   Assessment Type: Assess only  General Appearance: Sedated  Respiratory Pattern: Assisted  Chest Assessment: Chest expansion symmetrical  Bilateral Breath Sounds: Diminished  R Breath Sounds: Diminished, Clear  L Breath Sounds: Diminished, Clear  Suction: ET Tube  O2 Device: vent      Resp Comments: advanced ETT 4 cm to 28 at the lips in thecenter as ordered pt esperanza this well

## 2019-01-21 NOTE — RESPIRATORY THERAPY NOTE
RT Ventilator Management Note  Clinton Orellana 59 y o  male MRN: [id number]  Unit/Bed#: MICU 04 Encounter: [id number]      Daily Screen       1/19/2019 0845 1/20/2019 0900          Patient safety screen outcome[de-identified] Failed Failed      Not Ready for Weaning due to[de-identified] Underline problem not resolved;Going on Transport intubated Underline problem not resolved              Physical Exam:   Assessment Type: Assess only  General Appearance: Sedated  Respiratory Pattern: Assisted  Chest Assessment: Chest expansion symmetrical  Bilateral Breath Sounds: Diminished  R Breath Sounds: Diminished, Clear  L Breath Sounds: Diminished, Clear  Suction: ET Tube  O2 Device: vent      Resp Comments: during rounding pt was placed on PSV wean to esperanza, per [facility] he can be placed on [facility] when going for raditation

## 2019-01-22 NOTE — PLAN OF CARE
DISCHARGE PLANNING     Discharge to home or other facility with appropriate resources Progressing        DISCHARGE PLANNING - CARE MANAGEMENT     Discharge to post-acute care or home with appropriate resources Progressing        INFECTION - ADULT     Absence or prevention of progression during hospitalization Progressing     Absence of fever/infection during neutropenic period Progressing        Knowledge Deficit     Patient/family/caregiver demonstrates understanding of disease process, treatment plan, medications, and discharge instructions Progressing        METABOLIC, FLUID AND ELECTROLYTES - ADULT     Electrolytes maintained within normal limits Progressing     Fluid balance maintained Progressing     Glucose maintained within target range Progressing        Nutrition/Hydration-ADULT     Nutrient/Hydration intake appropriate for improving, restoring or maintaining nutritional needs Progressing        PAIN - ADULT     Verbalizes/displays adequate comfort level or baseline comfort level Progressing        Potential for Falls     Patient will remain free of falls Progressing        Prexisting or High Potential for Compromised Skin Integrity     Skin integrity is maintained or improved Progressing        RESPIRATORY - ADULT     Achieves optimal ventilation and oxygenation Progressing        SAFETY ADULT     Patient will remain free of falls Progressing     Maintain or return to baseline ADL function Progressing     Maintain or return mobility status to optimal level Progressing        SAFETY,RESTRAINT: NV/NON-SELF DESTRUCTIVE BEHAVIOR     Remains free of harm/injury (restraint for non violent/non self-detsructive behavior) Progressing     Returns to optimal restraint-free functioning Progressing

## 2019-01-22 NOTE — RESPIRATORY THERAPY NOTE
RT Ventilator Management Note  Nirali Wong 59 y o  male MRN: [id number]  Unit/Bed#: MICU 04 Encounter: [id number]      Daily Screen       1/19/2019 0845 1/20/2019 0900          Patient safety screen outcome[de-identified] Failed Failed      Not Ready for Weaning due to[de-identified] Underline problem not resolved;Going on Transport intubated Underline problem not resolved              Physical Exam:   Assessment Type: (P) Assess only  General Appearance: Awake  Respiratory Pattern: (P) Assisted  Bilateral Breath Sounds: (P) Diminished  Cough: Strong  Suction: (P) ET Tube (PRN)  O2 Device: vent  Subjective Data: BS coarse  Sx mod yellow secretions  Resp Comments: (P) Pt continues on full vent support  No significant events this shift  Pt awake at this time  'Bucking'  vent  Appears uncomfortable  Airway pressures within acceptable limits  Pt sx'd RN as needed  WIll continue full vent support

## 2019-01-22 NOTE — RESPIRATORY THERAPY NOTE
RT Ventilator Management Note  Brandy Gomez 59 y o  male MRN: [id number]  Unit/Bed#: [facility] 04 Encounter: [id number]      Daily Screen       1/20/2019 0900 1/22/2019 0740          Patient safety screen outcome[de-identified] Failed Passed      Not Ready for Weaning due to[de-identified] Underline problem not resolved -      Spont breathing trial % for 30 min: - Yes              Physical Exam:   Assessment Type: Assess only  General Appearance: Alert, Awake  Respiratory Pattern: Assisted  Chest Assessment: Chest expansion symmetrical  Bilateral Breath Sounds: Diminished (after sx)  Cough: Strong, Non-productive  Suction: ET Tube  O2 Device: vent      Resp Comments: Placed on PSV 8/5 50%, esperanza well, no resp distres, VC- 1 3L, stong cough, lungs clear after sx, sm clear white secretions noted, VS stable, will con't to monitor

## 2019-01-22 NOTE — CONSULTS
Consultation - [facility] 59 y o  male MRN: [id number]  Unit/Bed#: MICU 04 Encounter: [id number]      Assessment/Plan     Assessment:  Patient Active Problem List   Diagnosis    Oropharyngeal dysphagia    Type 2 diabetes mellitus without complication, without long-term current use of insulin ([facility] )    Essential hypertension    Morbid obesity due to excess calories ([facility] )    Gastroesophageal reflux disease without esophagitis    Anaplastic thyroid carcinoma with metastases    Difficulty in swallowing    Elevated TSH    Accelerated hypertension    Tracheal obstruction    CKD (chronic kidney disease) stage 3, GFR 30-59 ml/min (HCC)    Thyroid cancer ([facility] )    Airway obstruction       Plan:  · Goals of care  · Level 1 full code  · Met with patient's wife, Lanette Barajas, who wishes to continue "day by day" approach  · Lanette Barajas expresses understanding of terminal nature of anaplastic thyroid cancer  · Patient has living will completed which wife has reviewed  Will be helpful to have on file for reference  · Symptom management (per primary team)  · Clonidine 0 3mg QID  · precedex gtt  · Fentanyl gtt  · Fentanyl 50mcg Q1H PRN  · Social support  · [employer], palliative care LCSW also following for support  · Palliative care will continue to follow to provide family support throughout clinical course  May assist in future multidisciplinary discussions to facilitate a plan of care with the family  History of Present Illness   Physician Requesting Consult: Karon Mesa DO  Reason for Consult / Principal Problem: goals of care  Hx and PE limited by: intubation  HPI: Brandy Gomez is a 59y o  year old male , PMH HTN, DM2, thyroid CA, presented as a transfer from [place] [address] on 1/18 for emergent radiation therapy  Patient with recently dx metastatic thyroid cancer, stage IV with left neck and mediastinal LAD, pulmonary metastasis   Was seen at [facility] urgent care on 12/11 with negative CXR  He subsequently presented the following day with persistent dysphagia x1 5 weeks, cough, choking sensation  Thyroid US revealed left thyroid enlargement for which bx was completed on 12/24 and revealed undifferentiated thyroid carcinoma  CT soft tissue neck and chest also completed at that time confirming enlarged left thyroid with LAD within left cervical region and upper mediastinum in addition to b/l pulmonary nodules  He had PEG tube placed on 12/28 due to persistent dysphagia  Patient saw Dr Fouzia Godinez ([address] [place] [address]) on 12/31 at which time concurrent chemoradiation x7 weeks was discussed  Patient saw Dr Yohana Donato on 1/10 at which time plan to initiate doxorubicin with RT for palliation was discussed  Dr Yohana Donato also discussed prognosis estimated to be 6-12 months at that time  Patient presented for port placement at [facility] on 1/17 which took place without difficulties  However, later in the evening patient presented to [facility] for subjective sensation of airway obstruction after a nap  CT revealed tracheal obstruction secondary to mass effect and was scheduled to have trach placement on 1/18 however unable to be completed secondary to inability to safely complete in IR in setting of altered anatomy  Patient was subsequently intubated and transferred to [facility] for stat RT and tracheostomy re-evaluation  Patient underwent first RT session on 1/19 and oncology was consulted with plan to start on low dose adriamycin  Patient developed agitation while ventillated for which sedation has been adjusted  Plan at this time is to continue concurrent RT and chemo with reimaging later this week to assess tumor response  Of note patient is  to wife, Antolin Meyer has a daughter and a son  He is retired from [employer]  Patient identifies himself as Uatsdin and pastoral care has been following  Wife Antolin Meyer expresses being overwhelmed by rapid progression of disease but is supported by her two children   She is still working full time but is grateful for an understanding company who are flexible with her time off  Her two children typically visit in the evening  Inpatient consult to Palliative Care  Consult performed by: Umair Lvoell  Consult ordered by: Erika Darnell          Review of Systems   Unable to perform ROS: Intubated   Respiratory: Negative for shortness of breath  Musculoskeletal:        Denies pain/discomfort       Historical Information   Past Medical History:   Diagnosis Date    Cancer [facility]     Diabetes mellitus ([facility] )     GERD (gastroesophageal reflux disease)     Hypertension      Past Surgical History:   Procedure Laterality Date    COLONOSCOPY      ESOPHAGOGASTRODUODENOSCOPY N/A 12/28/2018    Procedure: ESOPHAGOGASTRODUODENOSCOPY (EGD) with PEG placement;  Surgeon: Sarah Romero MD;  Location: [facility];   Service: Gastroenterology    FRACTURE SURGERY Bilateral 2008    b/l legs    HERNIA REPAIR      IR PORT PLACEMENT  1/17/2019    LEG SURGERY Bilateral     TRACHEOSTOMY N/A 1/18/2019    Procedure: TRACHEOSTOMY;  Surgeon: Tima Smith DO;  Location: [facility];  Service: ENT   [address] BIOPSY LEFT  12/24/2018    US GUIDED THYROID BIOPSY  12/24/2018     Social History     Social History    Marital status: /Civil Union     Spouse name: N/A    Number of children: N/A    Years of education: N/A     Social History Main Topics    Smoking status: Former Smoker    Smokeless tobacco: Never Used      Comment: Former chewing tobacco use    Alcohol use No    Drug use: No    Sexual activity: Not Asked     Other Topics Concern    None     Social History Narrative    None     Family History   Problem Relation Age of Onset    Thyroid disease Mother     Prostate cancer Father        Meds/Allergies   all current active meds have been reviewed and current meds:   Current Facility-Administered Medications   Medication Dose Route Frequency    chlorhexidine (PERIDEX) 0 12 % oral rinse 15 mL  15 mL Swish & Spit Q12H [facility]    cloNIDine (CATAPRES) tablet 0 3 mg  0 3 mg Oral 4x Daily    dexmedetomidine (PRECEDEX) 200 mcg in sodium chloride 0 9 % 50 mL infusion  0 1-1 mcg/kg/hr Intravenous Titrated    [START ON 1/28/2019] DOXOrubicin (ADRIAMYCIN) injection 20 mg  20 mg Intravenous Once    [START ON 2/4/2019] DOXOrubicin (ADRIAMYCIN) injection 20 mg  20 mg Intravenous Once    fentaNYL 1250 mcg in sodium chloride 0 9% 125mL drip  75 mcg/hr Intravenous Continuous    fentanyl citrate (PF) 100 MCG/2ML 50 mcg  50 mcg Intravenous Q1H PRN    heparin (porcine) subcutaneous injection 5,000 Units  5,000 Units Subcutaneous Q8H [facility]    hydrALAZINE (APRESOLINE) injection 10 mg  10 mg Intravenous Q6H PRN    insulin glargine (LANTUS) subcutaneous injection 30 Units 0 3 mL  30 Units Subcutaneous Q12H [facility]    insulin regular (HumuLIN R,NovoLIN R) 1 Units/mL in sodium chloride 0 9 % 100 mL infusion  0 3-21 Units/hr Intravenous Titrated    metoprolol tartrate (LOPRESSOR) tablet 100 mg  100 mg Oral Q12H [facility]    omeprazole (PRILOSEC) suspension 2 mg/mL  20 mg Oral Daily    ondansetron (ZOFRAN) 16 mg, dexamethasone (DECADRON) 10 mg in sodium chloride 0 9 % 50 mL IVPB   Intravenous Q7 Days    polyethylene glycol (MIRALAX) packet 17 g  17 g Oral Daily    senna-docusate sodium (SENOKOT S) 8 6-50 mg per tablet 2 tablet  2 tablet Oral BID     No Known Allergies    Objective     Physical Exam   Constitutional: He appears well-developed and well-nourished  HENT:   Head: Normocephalic  Neck: Thyromegaly present  Cardiovascular: Normal rate  Pulmonary/Chest:   intubated   Abdominal: Soft  He exhibits no distension  Genitourinary:   Genitourinary Comments: Vogel catheter in place   Musculoskeletal: He exhibits no edema  Neurological: He is alert  Opens eyes to voice, answers questions appropriately with yes/no responses  Skin: Skin is warm and dry         Lab Results:   I have personally reviewed pertinent labs  , CBC:   Lab Results   Component Value Date    WBC 7 45 01/22/2019    HGB 10 5 (L) 01/22/2019    HCT 31 5 (L) 01/22/2019    MCV 94 01/22/2019     (L) 01/22/2019    MCH 31 2 01/22/2019    MCHC 33 3 01/22/2019    RDW 13 0 01/22/2019    MPV 10 0 01/22/2019    NRBC 0 01/22/2019   , CMP:   Lab Results   Component Value Date    SODIUM 137 01/22/2019    K 4 2 01/22/2019     01/22/2019    CO2 29 01/22/2019    BUN 19 01/22/2019    CREATININE 1 26 01/22/2019    CALCIUM 7 9 (L) 01/22/2019    EGFR 61 01/22/2019     Imaging Studies: I have personally reviewed pertinent reports  and I have personally reviewed pertinent films in PACS  EKG, Pathology, and Other Studies: reviewed telemetry at bedside    Code Status: Level 1 - Full Code  Advance Directive and Living Will:     living will completed, but not on file  Will ask wife to provide  Power of :   per [place], wife- Nilson Zheng  SHERWIN:   none completed    Counseling / Coordination of Care  Total floor / unit time spent today 35+ minutes  Greater than 50% of total time was spent with the patient and / or family counseling and / or coordination of care   A description of the counseling / coordination of care: time spent with patient, patient's wife, communication with primary team

## 2019-01-22 NOTE — RESPIRATORY THERAPY NOTE
RT Ventilator Management Note  Antonio Trejo 59 y o  male MRN: [id number]  Unit/Bed#: MICU 04 Encounter: [id number]      Daily Screen       1/19/2019 0845 1/20/2019 0900          Patient safety screen outcome[de-identified] Failed Failed      Not Ready for Weaning due to[de-identified] Underline problem not resolved;Going on Transport intubated Underline problem not resolved              Physical Exam:   Assessment Type: Assess only  General Appearance: Sedated  Respiratory Pattern: Assisted  Chest Assessment: Chest expansion symmetrical  Bilateral Breath Sounds: Diminished  Cough: Strong  Suction: ET Tube  O2 Device: vent      Resp Comments: con't on a/c settings as ordered 14/450/60%/+5, esperanza well, no resp distress, VS stable, no changes at this time, will con't to monitor

## 2019-01-22 NOTE — PROGRESS NOTES
Progress Note - Critical Care   Earline Ormond 59 y o  male MRN: [id number]  [facility] [id number]    Assessment:   1  Metastatic anaplastic thyroid cancer  2  Acute hypoxic respiratory failure secondary to airway obstruction  3  CKD III  4  DM2  5  Essential hypertension    Plan:   Neuro:   · Analgesia/sedation: precedex at 1 3 mcg/kg/min  Wean to 1 0 today and encourage use of prns  Fentanyl gtt at 75 mcg/hr with prn fentanyl for breakthrough pain  · Regulate sleep/wake cycle  · Delirium precautions, daily CAM-ICU    CV:   · Increase lopressor to 100mg BID  · Restart home clonidine    Lung:   · Maintain mechanical ventilation 14/450/40%/5  Daily PSV trials as able, though no plan to extubate given tumor burden  Will likely need re-imaging to determine ability for extubation  · Pulmonary toilet    GI:   · Daily omeprazole  · Increase bowel regimen - add miralax and increase frequency of senokot    FEN:   · No maintenance fluids  · Replete electrolytes as needed for goal Mag >2 0, Phos >3 0, K >4 0  · Jevity 1 2 at goal of 81cc/hr    :   · Consider dose of Lasix today given low UOP and positive overall fluid balance  · No joiner  · Monitor I/Os    ID:   · Monitor fever curve/white count    Heme/Onc:   · Heparin/SCDs for ppx  · Weekly Adriamycin, radiation per oncology    Endo:   · Insulin gtt - required 175 units in 24 hours  · Increase morning Lantus to 30 and add nighttime Lantus    Msk/Skin:   · Frequent turns and repositioning  · Local wound care to neck surgical site  · Up in chair position during the day    Disposition:   · ICU    ______________________________________________________________________  Chief Complaint: Intubated/sedated    HPI/24hr events: No acute events overnight   Radiation and chemotherapy yesterday  ______________________________________________________________________  Temperature:   Temp (24hrs), Av °F (37 2 °C), Min:98 7 °F (37 1 °C), Max:99 2 °F (37 3 °C)    Current Temperature: 99 2 °F (37 3 °C)    Vitals:    01/22/19 0400 01/22/19 0500 01/22/19 0552 01/22/19 0600   BP: 170/84 161/74  170/82   Pulse: 62 62  64   Resp: 21 22 20   Temp: 99 2 °F (37 3 °C)      TempSrc:       SpO2: 94% 95%  95%   Weight:   104 kg (230 lb 6 1 oz)    Height:                  Weights:   IBW: 73 kg    Body mass index is 33 06 kg/m²  Weight (last 2 days)     Date/Time   Weight    01/22/19 0552  104 (230 38)    01/21/19 1200  115 (254 41)    01/21/19 0451  115 (254 41)    01/20/19 1401  107 (235 89)            Height: 5' 10" (177 8 cm)    Ventilator Settings:   Vent Mode: AC/VC  Resp Rate (BPM): 14 BPM  Vt (mL): 450 mL  FIO2 (%): 60 %  PEEP (cmH2O): 5 cmH2O  SpO2: 95 %    No results found for: PHART, XXE5JRM, PO2ART, OTI0WFV, Z1HLSNLI, BEART, SOURCE  SpO2: SpO2: 95 %    ______________________________________________________________________  Physical Exam:  [place] Agitation Sedation Scale (RASS): Drowsy  Physical Exam   Constitutional: He appears well-developed and well-nourished  He is cooperative  He is easily aroused  No distress  He is sedated, intubated and restrained  HENT:   Head: Normocephalic and atraumatic  Eyes: Pupils are equal, round, and reactive to light  Neck:   Anterior attempted tracheostomy surgical site C/D/I with staples   Cardiovascular: Normal rate, regular rhythm and normal heart sounds  Pulmonary/Chest: He is intubated  He has no decreased breath sounds  He has no wheezes  He has no rhonchi  He has no rales  Abdominal: Soft  He exhibits distension  There is no tenderness  Musculoskeletal:   No peripheral edema   Neurological: He is alert and easily aroused  GCS eye subscore is 4  GCS verbal subscore is 1  GCS motor subscore is 6  Skin: Skin is warm, dry and intact   He is not diaphoretic      ______________________________________________________________________  Intake and Outputs:  I/O       01/20 0701 - 01/21 0700 01/21 0701 - 01/22 0700 I V  (mL/kg) 1883 1 (16 4) 1107 1 (10 6)    NG/GT 30 190    IV Piggyback  50    Feedings 1334 1284    Total Intake(mL/kg) 3247 1 (28 2) 2631 1 (25 3)    Urine (mL/kg/hr) 1195 (0 4) 820 (0 3)    Total Output 1195 820    Net +2052 1 +1811 1              UOP: 0 3cc/kg/hour   Nutrition:        Diet Orders            Start     Ordered    01/21/19 1305  Diet Enteral/Parenteral; Tube Feeding No Oral Diet; Jevity 1 2 Rip; Continuous; 81  Diet effective now     Comments:  Start at 10 ml/hr and advance to goal as tolerated   Question Answer Comment   Diet Type Enteral/Parenteral    Enteral/Parenteral Tube Feeding No Oral Diet    Tube Feeding Formula: Jevity 1 2 Rip    Bolus/Cyclic/Continuous Continuous    Tube Feeding Goal Rate (mL/hr): 81    RD to adjust diet per protocol? Yes        01/21/19 1305        Formula: Jevity 1 2, currently running at 81ml/hr, with a goal of 81ml/hr  Labs:     Results from last 7 days  Lab Units 01/22/19 0423 01/21/19 0423 01/20/19  0529 01/19/19  0500  01/18/19  0432 01/17/19 1933   WBC Thousand/uL 7 45 8 37 6 96 10 33*  --  8 24 10 68*   HEMOGLOBIN g/dL 10 5* 10 9* 12 0 10 7*  --  12 7 13 4   HEMATOCRIT % 31 5* 32 4* 34 9* 30 8*  --  36 9 38 6   PLATELETS Thousands/uL 133* 144* 164 202  < > 210 247   NEUTROS PCT % 85*  --   --  83*  --   --  74   MONOS PCT % 9  --   --  6  --   --  10   MONO PCT %  --   --   --   --   --  2*  --    < > = values in this interval not displayed  Results from last 7 days  Lab Units 01/22/19 0423 01/21/19 0423 01/20/19 0529 01/17/19 1933   POTASSIUM mmol/L 4 2 3 6 3 8  < > 3 6   CHLORIDE mmol/L 103 101 99*  < > 96*   CO2 mmol/L 29 30 25  < > 25   BUN mg/dL 19 20 25  < > 19   CREATININE mg/dL 1 26 1 38* 1 33*  < > 1 38*   CALCIUM mg/dL 7 9* 7 9* 7 9*  < > 9 9   ALK PHOS U/L  --   --   --   --  86   ALT U/L  --   --   --   --  15   AST U/L  --   --   --   --  16   < > = values in this interval not displayed      Results from last 7 days  Lab Units 19  0423 19  0423 19  0529   MAGNESIUM mg/dL 2 3 2 2 2 2       Results from last 7 days  Lab Units 19  0529 19  0500   PHOSPHORUS mg/dL 2 3 4 4*              No results found for: TROPONINI  Imagin/21 CXR: 1  Endotracheal tube tip at the thoracic inlet  Advancement of approximately 3 cm recommended  2  Persistent left basilar airspace disease with left effusion mildly worsened since prior study  Unchanged right basilar airspace disease  I have personally reviewed pertinent reports      Allergies: No Known Allergies  Medications:   Scheduled Meds:    Current Facility-Administered Medications:  chlorhexidine 15 mL Swish & Spit Q12H [facility] Elvira Rodríguez MD    dexmedetomidine 0 1-1 mcg/kg/hr Intravenous Titrated Elvira Jimenez PA-C Last Rate: 1 3 mcg/kg/hr (19 0619)   [START ON 2019] DOXOrubicin 20 mg Intravenous Once Virginie Awkward, DO    [START ON 2019] DOXOrubicin 20 mg Intravenous Once Virginie Awkward, DO    fentaNYL 75 mcg/hr Intravenous Continuous Ryann England MD Last Rate: 75 mcg/hr (19 0135)   fentanyl citrate (PF) 50 mcg Intravenous Q1H PRN Ryann England MD    heparin (porcine) 5,000 Units Subcutaneous [facility] Elvira Rod[facility]guez MD    hydrALAZINE 10 mg Intravenous Q6H PRN Elvira Jimenez PA-C    insulin glargine 25 Units Subcutaneous QAM Elvira Jimenez PA-C    insulin regular (HumuLIN R,NovoLIN R) infusion 0 3-21 Units/hr Intravenous Titrated JETHRO Bridges Last Rate: 14 Units/hr (19 0550)   metoprolol tartrate 100 mg Oral Q12H [facility] Elvira Jimenez PA-C    omeprazole (PRILOSEC) suspension 2 mg/mL 20 mg Oral Daily Elvira Jimenez PA-C    ondansetron with dexamethasone IVPB  Intravenous Q7 Days Virginie Awkward, DO Last Rate: Stopped (19 1515)   polyethylene glycol 17 g Oral Daily Elvira Jimenez PA-C    propofol 5-50 mcg/kg/min Intravenous Titrated Elvira Rodríguez MD Last Rate: Stopped (19 0602)   senna-docusate sodium 2 tablet Oral BID Aretha Godoy PA-C      Continuous Infusions:    dexmedetomidine 0 1-1 mcg/kg/hr Last Rate: 1 3 mcg/kg/hr (01/22/19 6432)   fentaNYL 75 mcg/hr Last Rate: 75 mcg/hr (01/22/19 0135)   insulin regular (HumuLIN R,NovoLIN R) infusion 0 3-21 Units/hr Last Rate: 14 Units/hr (01/22/19 0550)   propofol 5-50 mcg/kg/min Last Rate: Stopped (01/21/19 0602)     PRN Meds:    fentanyl citrate (PF) 50 mcg Q1H PRN   hydrALAZINE 10 mg Q6H PRN     VTE Pharmacologic Prophylaxis:   Pharmacologic: Heparin  Mechanical VTE Prophylaxis in Place: Yes    Invasive lines and devices: Invasive Devices     Central Venous Catheter Line            Port A Cath 01/21/19 Right Chest less than 1 day          Peripheral Intravenous Line            Peripheral IV 01/18/19 Left Hand 3 days    Peripheral IV 01/22/19 Right Antecubital less than 1 day          Drain            Gastrostomy/Enterostomy Percutaneous endoscopic gastrostomy (PEG) 20 Fr  LUQ 24 days          Airway            ETT  Cuffed;Oral;Straight; Inflated 8 mm 3 days              Counseling / Coordination of Care  Total Critical Care time spent 0 minutes excluding procedures, teaching and family updates        Code Status: Level 1 - Full Code      Aretha Godoy PA-C

## 2019-01-23 NOTE — RESPIRATORY THERAPY NOTE
RT Ventilator Management Note  Betty Lot 59 y o  male MRN: [id number]  Unit/Bed#: MICU 04 Encounter: [id number]      Daily Screen       1/20/2019 0900 1/22/2019 0740          Patient safety screen outcome[de-identified] Failed Passed      Not Ready for Weaning due to[de-identified] Underline problem not resolved -      Spont breathing trial % for 30 min: - Yes              Physical Exam:   Assessment Type: Assess only  General Appearance: Sleeping  Respiratory Pattern: Assisted  Chest Assessment: Chest expansion symmetrical  Bilateral Breath Sounds: Diminished, Clear  Suction: ET Tube  O2 Device: vent      Resp Comments: pt cont to esperanza current PSV settings no distress noted resting comfortably at this time will cont to monitor

## 2019-01-23 NOTE — PROGRESS NOTES
Progress Note - Critical Care   Greg Shore 59 y o  male MRN: [id number]  Unit/Bed#: MICU 04 Encounter: [id number]    Attending Physician: Junior Nath DO      ______________________________________________________________________  Assessment and Plan:   Principal Problem:    Anaplastic thyroid carcinoma with metastases  Active Problems:    Difficulty in swallowing    Airway obstruction    Type 2 diabetes mellitus without complication, without long-term current use of insulin ([facility])    Elevated TSH    Essential hypertension    Gastroesophageal reflux disease without esophagitis    CKD (chronic kidney disease) stage 3, GFR 30-59 ml/min ([facility])  Resolved Problems:    * No resolved hospital problems  *        Neuro:   Sedation/analgesia: precedex @1, fentanyl @ 75, fentanyl 50 mics q 1 hour p r n  Severe pain  Neuro checks, CAM ICU screening (currently positive)  Sleep hygiene, avoid benzos    CV:   Hypertension:  Continue clonidine 0 3 mg 4 times daily, metoprolol 100 mg q 12 hours and p r n  Hydralazine 10 mg IV q 6 for systolic blood pressure greater than 180  Consider adding Norvascn (takes amlodipine-benazepril) home which is on hold right now  DVT prophylaxis:  Heparin subcu      Pulm:   Respiratory failure:  Discuss with ENT definitive airway protection management (timing of re-attempt of trach--unclear duration of therapy of radiation/chemo prior to seeing tumor response)  Reviewing radiation onc note, the possibility of eventual extubation remains uncertain    Continue Radiation/Adriamycin as per Heme-Onc/radiation on  Continue pressure support ventilation, chlorhexidine, head of bed above 30°      GI:   Continue tube feeds, omeprazole (home med)  Bowel regimen:  Senokot      :   Stable, urinary retention protocol    F/E/N:  Replete electrolytes as needed    ID:  No obvious infection    Heme:   Stable    Endo:   Hyperglycemia:  Consider increasing Lantus to 50 units q 12 hours     Msk/Skin:  Frequent repositioning assess daily for skin breakdown    Disposition:  Continue acute care    Code Status: Level 1 - Full Code    Counseling / Coordination of Care  Total time spent today 33 minutes  Greater than 50% of total time was spent with the patient and / or family counseling and / or coordination of care  A description of the counseling / coordination of care: 0  ______________________________________________________________________    Chief Complaint: none (intubated/sedated)    24 Hour Events:   Received ativan overnight for agitation  Hypertensive    I/O 3  4 +1 7    Infusions:   Insulin   precedex  Fentanyl   jevity    Review of Systems   Unable to perform ROS: Intubated     ______________________________________________________________________    Physical Exam:   Physical Exam   Constitutional: He appears well-developed  HENT:   Head: Normocephalic  Eyes: Pupils are equal, round, and reactive to light  Neck: Normal range of motion  No JVD present  Clean vertical incision w/o erythema   Cardiovascular: Normal rate, regular rhythm, intact distal pulses and normal pulses  Pulmonary/Chest: Effort normal and breath sounds normal    Abdominal: Soft  Bowel sounds are normal  He exhibits distension  Musculoskeletal: Normal range of motion  Neurological: GCS eye subscore is 4  GCS verbal subscore is 1  GCS motor subscore is 6  CAM-ICU positive   Skin: Skin is warm, dry and intact  Capillary refill takes less than 2 seconds   He is not diaphoretic        ______________________________________________________________________  Vitals:    19 0400 19 0600 19 0606 19 0714   BP: 147/68 (!) 194/91 (!)     BP Location: Right arm      Pulse: 76 96     Resp: 18      Temp: 99 9 °F (37 7 °C)      TempSrc: Axillary      SpO2: 95% 96%  94%   Weight:  100 kg (220 lb 7 4 oz)     Height:           Temperature:   Temp (24hrs), Av 9 °F (37 7 °C), Min:99 °F (37 2 °C), Max:101 6 °F (38 7 °C)    Current Temperature: 99 9 °F (37 7 °C)  Weights:   IBW: 73 kg    Body mass index is 31 63 kg/m²  Weight (last 2 days)     Date/Time   Weight    01/23/19 0600  100 (220 46)    01/22/19 0552  104 (230 38)    01/21/19 1200  115 (254 41)    01/21/19 0451  115 (254 41)            Hemodynamic Monitoring:  N/A     Non-Invasive/Invasive Ventilation Settings:  Respiratory    Lab Data (Last 4 hours)    None         O2/Vent Data (Last 4 hours)      01/23 0342 01/23 0714         Vent Mode CPAP/PS Spont CPAP/PS Spont      Resp Rate (BPM) (BPM)  14      Vt (mL) (mL)  450      FIO2 (%) (%)  60      PEEP (cmH2O) (cmH2O)  5      FIO2 (%) (%) 60 60      PEEP (cmH2O) (cmH2O) 5 5      Pressure Support (cmH2O) (cmH20) 10 10      MV (Obs) 8 35 15      RSBI 42 41                No results found for: PHART, LEE0XVR, PO2ART, NYX2PXT, B9LYPTAV, BEART, SOURCE  SpO2: SpO2: 94 %  Intake and Outputs:  I/O       01/21 0701 - 01/22 0700 01/22 0701 - 01/23 0700 01/23 0701 - 01/24 0700    I V  (mL/kg) 1107 1 (10 6) 1035 1 (10 4)     NG/ 310     IV Piggyback 50      Feedings 1284 1823     Total Intake(mL/kg) 2631 1 (25 3) 3168 1 (31 7)     Urine (mL/kg/hr) 820 (0 3) 1447 (0 6)     Total Output 820 1447      Net +1811 1 +1721 1             Unmeasured Urine Occurrence  1 x           Nutrition:        Diet Orders            Start     Ordered    01/21/19 1305  Diet Enteral/Parenteral; Tube Feeding No Oral Diet; Jevity 1 2 Rip; Continuous; 81  Diet effective now     Comments:  Start at 10 ml/hr and advance to goal as tolerated   Question Answer Comment   Diet Type Enteral/Parenteral    Enteral/Parenteral Tube Feeding No Oral Diet    Tube Feeding Formula: Jevity 1 2 Rip    Bolus/Cyclic/Continuous Continuous    Tube Feeding Goal Rate (mL/hr): 81    RD to adjust diet per protocol?  Yes        01/21/19 1305        Labs:     Results from last 7 days  Lab Units 01/23/19  0619 01/22/19  0423 01/21/19  0423 01/20/19  0529 01/19/19  0500 01/18/19 2255 01/18/19 0432 01/17/19  1933   WBC Thousand/uL 6 69 7 45 8 37 6 96 10 33*  --  8 24 10 68*   HEMOGLOBIN g/dL 10 5* 10 5* 10 9* 12 0 10 7*  --  12 7 13 4   HEMATOCRIT % 31 8* 31 5* 32 4* 34 9* 30 8*  --  36 9 38 6   PLATELETS Thousands/uL 156 133* 144* 164 202 218 210 247   NEUTROS PCT % 75 85*  --   --  83*  --   --  74   BANDS PCT %  --   --   --   --   --   --  1  --    MONOS PCT % 14* 9  --   --  6  --   --  10   MONO PCT %  --   --   --   --   --   --  2*  --        Results from last 7 days  Lab Units 01/23/19 0619 01/22/19 0423 01/21/19 0423 01/20/19 0529 01/19/19  0500 01/18/19 0432 01/17/19  1933   SODIUM mmol/L 136 137 136 132* 134* 136 136   POTASSIUM mmol/L 3 8 4 2 3 6 3 8 4 1 4 1 3 6   CHLORIDE mmol/L 102 103 101 99* 99* 97* 96*   CO2 mmol/L 26 29 30 25 20* 24 25   ANION GAP mmol/L 8 5 5 8 15* 15* 15*   BUN mg/dL 21 19 20 25 29* 19 19   CREATININE mg/dL 1 19 1 26 1 38* 1 33* 1 43* 1 24 1 38*   CALCIUM mg/dL 8 4 7 9* 7 9* 7 9* 8 1* 9 1 9 9   ALT U/L  --   --   --   --   --   --  15   AST U/L  --   --   --   --   --   --  16   ALK PHOS U/L  --   --   --   --   --   --  86   ALBUMIN g/dL  --   --   --   --   --   --  3 3*   TOTAL BILIRUBIN mg/dL  --   --   --   --   --   --  1 19*       Results from last 7 days  Lab Units 01/22/19 0423 01/21/19 0423 01/20/19  0529 01/19/19  0500   MAGNESIUM mg/dL 2 3 2 2 2 2 2 2   PHOSPHORUS mg/dL  --   --  2 3 4 4*                  ABG:  Lab Results   Component Value Date    PHART 7 414 01/18/2019    NUZ6FRD 32 5 (L) 01/18/2019    PO2ART 103 1 01/18/2019    BPX8CTN 20 3 (L) 01/18/2019    BEART -3 3 01/18/2019    SOURCE Radial, Right 01/18/2019     VBG:  Results from last 7 days  Lab Units 01/18/19  1341   ABG SOURCE  Radial, Right         No results found for: [facility]   Imaging: none new I have personally reviewed pertinent reports  EKG: Sinus  Micro:       Allergies: No Known Allergies  Medications:   Scheduled Meds:  Current Facility-Administered Medications:  acetaminophen 975 mg Oral Q8H PRN Elliot Balsam, DO    chlorhexidine 15 mL Swish & Spit Q12H [facility] Gavi Byrne MD    cloNIDine 0 3 mg Oral 4x Daily Gabriel Cadena PA-C    dexmedetomidine 0 1-1 mcg/kg/hr Intravenous Titrated RENÉ Cardoza-C Last Rate: 1 mcg/kg/hr (01/23/19 3800)   [START ON 1/28/2019] DOXOrubicin 20 mg Intravenous Once Jo Lemon, DO    [START ON 2/4/2019] DOXOrubicin 20 mg Intravenous Once Jo Lemon, DO    fentaNYL 75 mcg/hr Intravenous Continuous Sameer Macias MD Last Rate: 75 mcg/hr (01/22/19 1811)   fentanyl citrate (PF) 50 mcg Intravenous Q1H PRN Gabriel Cadena PA-C    heparin (porcine) 5,000 Units Subcutaneous [facility] Gavi Byrne MD    hydrALAZINE 10 mg Intravenous Q6H PRN Gabriel Cadena PA-C    insulin glargine 30 Units Subcutaneous Q12H [facility] Gabriel Cadena PA-C    insulin regular (HumuLIN R,NovoLIN R) infusion 0 3-21 Units/hr Intravenous Titrated JETHRO Fitzgerald Last Rate: 8 Units/hr (01/23/19 6618)   metoprolol tartrate 100 mg Oral Q12H [facility] Gabriel Cadena PA-C    omeprazole (PRILOSEC) suspension 2 mg/mL 20 mg Oral Daily DEJA CardozaC    ondansetron with dexamethasone IVPB  Intravenous Q7 Days Jo Lemon, DO Last Rate: Stopped (01/21/19 1515)   polyethylene glycol 17 g Oral Daily Gabriel Cadena PA-C    senna-docusate sodium 2 tablet Oral BID DEJA CardozaC      Continuous Infusions:  dexmedetomidine 0 1-1 mcg/kg/hr Last Rate: 1 mcg/kg/hr (01/23/19 2231)   fentaNYL 75 mcg/hr Last Rate: 75 mcg/hr (01/22/19 1811)   insulin regular (HumuLIN R,NovoLIN R) infusion 0 3-21 Units/hr Last Rate: 8 Units/hr (01/23/19 3934)     PRN Meds:    acetaminophen 975 mg Q8H PRN   fentanyl citrate (PF) 50 mcg Q1H PRN   hydrALAZINE 10 mg Q6H PRN     VTE Pharmacologic Prophylaxis: Heparin  VTE Mechanical Prophylaxis: sequential compression device  Invasive lines and devices:   Invasive Devices     Central Venous Catheter Line            Port A Cath 01/21/19 Right Chest 1 day          Peripheral Intravenous Line            Peripheral IV 01/22/19 Right Antecubital 1 day          Drain            Gastrostomy/Enterostomy Percutaneous endoscopic gastrostomy (PEG) 20 Fr  LUQ 25 days          Airway            ETT  Cuffed;Oral;Straight; Inflated 8 mm 4 days                     Portions of the record may have been created with voice recognition software  Occasional wrong word or "sound a like" substitutions may have occurred due to the inherent limitations of voice recognition software  Read the chart carefully and recognize, using context, where substitutions have occurred      [place], [place]

## 2019-01-23 NOTE — RESPIRATORY THERAPY NOTE
resp care      01/23/19 1821   Respiratory Assessment   Resp Comments RN called; pt stating he feels like he needs "more oxygen " Pt assessed at this time  Pt placed on ACVC at this time to rest for HS  ETT  Cuffed;Oral;Straight; Inflated 8 mm   Placement Date/Time: 01/18/19 1130   Mask Ventilation: (c) Ventilated by mask with oral airway (2)  Preoxygenated: Yes  Technique: (c) Direct laryngoscopy;Rapid sequence;Stylet; Video laryngoscopy  Type: Cuffed;Oral;Straight; Inflated  Tube Size: 8 mm         Secured at (cm) 28   Measured from Lips   Secured Location Right   Secured by [employer] tube bass   Site Condition Dry

## 2019-01-23 NOTE — UTILIZATION REVIEW
Continued Stay Review for 1/23/19         INCLUDING ORIGINAL CLINICAL REVIEW FOR ADMISSION TO [facility] MICU ON 1/18/19 AT THE END OF THIS REVIEW     Date: 1/23/19  PATIENT REMAINS IN MICU ON VENTILATOR WITH PSV SETTINGS  Vital Signs: /86   Pulse 86   Temp 99 6 °F (37 6 °C) (Oral)   Resp 18   Ht 5' 10" (1 778 m)   Wt 100 kg (220 lb 7 4 oz)   SpO2 92%   BMI 31 63 kg/m²    01/23/19 1300  --  92  --   172/87  121  93 %   01/23/19 1200  99 6 °F (37 6 °C)  80  --   211/91  125  94 %   01/23/19 1100  --  84  --   196/88  124  92 %   01/23/19 1010  --  84  --  --  --  94 %   01/23/19 1000  --  84  --  148/72  96   88 %   01/23/19 0700  --  86  --   173/78  111  95 %   01/23/19 0606  --  --  --   194/91  --  --   01/23/19 0600  --  96  --   194/91  134  96 %   01/23/19 0004  --  --  --   171/71  --  --     Assessment/Plan:   24 Hour Events: Received ativan overnight for agitation; Hypertensive    1  Acute hypoxic respiratory failure secondary to airway obstruction  · Cont PSV for now  · No extubation today given lack of cuff leak, start 24hrs decadron and reassess in AM  · Holding further propofol, continue fentanyl gtt and wean precedex gtt, started clonidine, wean for goal RASS -1 to 0  · Delirium precautions, PT/OT, chair position  · Trial lasix 20mg x 1     2  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, lung, liver  · Cont XRT  · Follow up with oncology planned time to response  · Plan repeat imaging later this week to assess tumor response  · I discussed with Dr Josemanuel Quinteros - ENT and will reassess tumor response, ability for extubation and consider surgical airway after further discussions regarding potential risks    · Appreciate HPM assistance     3  CKD III  · stable     4  HTN  · Cont lopressor, clonidine     5  DM2  · Continue insulin gtt  · Increase lantus 50units BID     6   Fever   · Trend temp, WBC  · Noted mildly elevated PCT, no new infiltrates on CXR, monitor off abx for now     7  Delirium  · Delirium precautions, trial Haldol x 1 now, start seroquel 25mg q12rs  · Follow QTc, currently 418ms     8   ICU CARE  · SCDs/UFH TID  · PPI/CHG/HOB >30deg  · TFs to goal, escalate bowel regimen  · Family updated daily at bedside    Medications:   Scheduled Meds:   Current Facility-Administered Medications:  acetaminophen 975 mg Oral Q8H PRN    amLODIPine 5 mg Oral Daily    chlorhexidine 15 mL Swish & Spit Q12H [address]    cloNIDine 0 3 mg Oral 4x Daily    dexamethasone 10 mg Intravenous Q8H [address]    dexmedetomidine 0 1-1 mcg/kg/hr Intravenous Titrated Last Rate: 1 mcg/kg/hr (01/23/19 1441)   [START ON 1/28/2019] DOXOrubicin 20 mg Intravenous Once    [START ON 2/4/2019] DOXOrubicin 20 mg Intravenous Once    fentaNYL 75 mcg/hr Intravenous Continuous Last Rate: 75 mcg/hr (01/23/19 1107)   fentanyl citrate (PF) 50 mcg Intravenous Q1H PRN    heparin (porcine) 5,000 Units Subcutaneous Q8H [address]    hydrALAZINE 10 mg Intravenous Q6H PRN    insulin glargine 50 Units Subcutaneous Q12H [address]    insulin regular (HumuLIN R,NovoLIN R) infusion 0 3-21 Units/hr Intravenous Titrated Last Rate: 8 Units/hr (01/23/19 1230)   lisinopril 20 mg Oral Daily    metoprolol tartrate 100 mg Oral Q12H [facility]    omeprazole (PRILOSEC) suspension 2 mg/mL 20 mg Oral Daily    ondansetron with dexamethasone IVPB  Intravenous Q7 Days Last Rate: Stopped (01/21/19 1515)   polyethylene glycol 17 g Oral Daily    QUEtiapine 25 mg Oral BID    senna-docusate sodium 2 tablet Oral BID      Continuous Infusions:   dexmedetomidine 0 1-1 mcg/kg/hr Last Rate: 1 mcg/kg/hr (01/23/19 1441)   fentaNYL 75 mcg/hr Last Rate: 75 mcg/hr (01/23/19 1107)   insulin regular (HumuLIN R,NovoLIN R) infusion 0 3-21 Units/hr Last Rate: 8 Units/hr (01/23/19 1230)     PRN Meds:   acetaminophen    fentanyl citrate (PF)    hydrALAZINE     Pertinent Labs/Diagnostic Results:   Glucose 149  H/H 10 5/31 8  POC glucose 154, 142, 141  procalcitonin 0 49    1/23 CXR - Persistent left basilar opacity and pleural effusion  Improving right basilar atelectasis/infiltrate      1/23 ECG - Normal sinus rhythm  Left axis deviation  Low voltage QRS    Age/Sex: 59 y o  male     Discharge Plan: TBD REMAINS IN MICU ON [facility] Utilization Review Department  Phone: [phone]; Fax [phone]  [email or web address]  ATTENTION: Please call with any questions or concerns to [phone]  and carefully listen to the prompts so that you are directed to the right person  Send all requests for admission clinical reviews, approved or denied determinations and any other requests to fax [phone]  All voicemails are confidential       _________________________  Network Utilization Review Department  Phone: [phone]; Fax [phone]  [email or web address]  ATTENTION: Please call with any questions or concerns to [phone]  and carefully listen to the prompts so that you are directed to the right person  Send all requests for admission clinical reviews, approved or denied determinations and any other requests to fax [phone]  All voicemails are confidential      Initial Clinical Review     Admission: Date/Time/Statement: 1/18/19 @ 2044         Orders Placed This Encounter   Procedures    Inpatient Admission       Standing Status:   Standing       Number of Occurrences:   1       Order Specific Question:   Admitting Physician       Answer:   Katie Hair [[id number]]       Order Specific Question:   Level of Care       Answer:   Critical Care [15]       Order Specific Question:   Estimated length of stay       Answer:   More than 2 Midnights       Order Specific Question:   Certification       Answer:   I certify that inpatient services are medically necessary for this patient for a duration of greater than two midnights   See H&P and MD Progress Notes for additional information about the patient's course of treatment       ED: Date/Time/Mode of Arrival: Tx from [facility] ICU (Admit 1/17-1/18)     Chief Complaint: Upper airway obstruction secondary to anaplastic thyroid carcinoma, unable to place tracheostomy     History of Illness: 59year old male with past medical history of diabetes mellitus non-insulin, hypertension, GERD, and 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 who presents as a transfer from [address] [place] [address] is obtained from chart review as the patient is intubated and sedated   Per review of records, the patient presented to [facility] yesterday with complaints of a globus sensation and difficulty breathing   Patient was admitted and started on IV steroids  [facility] was evaluated by ENT who recommended tracheostomy placement   The patient was intubated in the OR and a tracheostomy was attempted   Unfortunately, due to the patient's extensive tumor burden, a tracheostomy was unable to be successfully placed   Patient was transferred to this facility for radiation therapy with consideration of tracheostomy if the tumor burden is reduced   Patient appears to have been recently diagnosed with anaplastic thyroid carcinoma  Grijalva Ro of records shows a CT of the neck performed on December 24th which demonstrated significant enlargement of the left thyroid gland secondary to mass which was inseparable from the upper esophagus   Esophageal thickening was noted extending into the upper mediastinum   Enhancing adenopathy was noted in the neck as well as in the upper mediastinum inferior to the mass   There also were pulmonary nodules and a pleural based mass in the left apex noted, suspicious for metastases   Patient received a PET-CT which demonstrated the mass, pulmonary metastases, multiple osseous metastases, and a liver metastasis   Repeat CT of the neck was performed on January 17th and demonstrated interval increase in the size of the primary mass as well as lymphadenopathy and metastases   The patient has not received any oncologic treatment including radiation therapy or chemotherapy at this time      Vital Signs:    01/18 0701  01/19 0700 01/19 0701  01/19 2128    Most Recent     Temperature (°F) 95 998 7 97 398 2 98 2 (36 8)     Pulse 5474 5662 60     Respirations 1334 1314 14     Blood Pressure 114/63199/94 108/60154/74 108/60     Shock Index 0 360 51 0 360 56 0 56     SpO2 (%) 9399 9497 94           Pertinent Labs/Diagnostic Test Results:     PHART 7 414 01/18/2019     [id number] 32 5 (L) 01/18/2019     PO2ART 103 1 01/18/2019     [id number] 20 3 (L) 01/18/2019     BEART -3 3 01/18/2019     SOURCE Radial, Right 01/18/2019      CL 97, Anion gap 15, Glucose 192     CXR 1/19-- Endotracheal tube tip at the level of the thoracic inlet, and should be advanced 5 cm into the trachea  Left basilar opacity consistent with small pleural effusion and consolidation due to atelectasis or pneumonia   Mild right basilar consolidation also noted       Past Medical/Surgical History:         Active Ambulatory Problems     Diagnosis Date Noted    Oropharyngeal dysphagia 12/27/2018    Type 2 diabetes mellitus without complication, without long-term current use of insulin ([address] ) 12/27/2018    Essential hypertension 12/27/2018    Morbid obesity due to excess calories ([address] ) 12/27/2018    Gastroesophageal reflux disease without esophagitis 12/27/2018    Primary cancer of thyroid with metastasis to other site [facility]) 12/27/2018    Difficulty in swallowing 12/27/2018    Elevated TSH 01/18/2019    Accelerated hypertension 01/18/2019    Tracheal obstruction 01/18/2019    CKD (chronic kidney disease) stage 3, GFR 30-59 ml/min ([facility]) 01/18/2019    Thyroid cancer ([address] ) 01/17/2019           Past Medical History:   Diagnosis Date    Cancer ([address] )      Diabetes mellitus ([address] )      GERD (gastroesophageal reflux disease)      Hypertension        Admitting Diagnosis: Tracheal obstruction [J39 8]  Age/Sex: 59 y o  male      Assessment/Plan:   Acute hypoxic respiratory failure secondary to airway/tracheal obstruction due to thyroid cancer with failed attempted tracheostomy 01/18/2019  Anaplastic Thyroid cancer with metastatic disease  Hypertension  CKD stage 3  Diabetes mellitus type 2 with insulin use and elevated hemoglobin A1c of 8 7  GERD   Plan:  Patient arrived to [address] intubated appear to be comfortable with sedation   Off sedation patient will move all extremities and follows commands   Continue with sedation with goal RASS of -1 and continue with ventilatory support   Plan for radiation oncology evaluation in the a m  with treatment tomorrow  Juluis Left patient's steroids as per ENT  Margie Pacheco to discuss with ENT if further attempts will be made a tracheostomy status post radiation treatment      Maintain systolic blood pressure less than 160   Labetalol to be administered as needed   Monitor blood glucose levels q 6 hours   Insulin sliding scale for glycemic control   Patient's creatinine appears to be at baseline   Continue to monitor laboratory data as well as urinary output       Admission Orders:  Scheduled Meds:   Current Facility-Administered Medications:  chlorhexidine 15 mL Swish & Spit Q12H [facility]     fentaNYL 50 mcg/hr Intravenous Continuous Last Rate: 50 mcg/hr (01/19/19 1959)   fentanyl citrate (PF) 50 mcg Intravenous Q1H PRN     heparin (porcine) 5,000 Units Subcutaneous Q8H [facility]     [START ON 1/20/2019] insulin glargine 25 Units Subcutaneous QAM     insulin lispro 2-12 Units Subcutaneous Q6H [facility]     multi-electrolyte 100 mL/hr Intravenous Continuous Last Rate: 100 mL/hr (01/19/19 1657)   pantoprazole 40 mg Intravenous Q24H [facility]     propofol 5-50 mcg/kg/min Intravenous Titrated Last Rate: 40 mcg/kg/min (01/19/19 1959)       Admit to MICU  Vent 15/5, FiO2 80%  Telem  Vogel cath  SCD's  Npo  Fingerstick glucose checks q6h  Consult ENT, oncology, radiation oncology      Radiation oncology consult:  Assessment and Plan:  Mr Gustavo Velasquez is a 59year old male with recently diagnosed metastatic anaplastic thyroid cancer, now admitted for airway compromise secondary to rapid progression of primary neck mass with tracheal compromise   Emergent tracheostomy was attempted but aborted, and the patient remains intubated for airway protection   The patient and his family are known to our Department as well as Subhash has previously been explained that this is unfortunately not a curable cancer   The initial plan, to be performed as an outpatient, was to initiate a definitive-intent course of chemo-radiation therapy, delivered over 7 weeks  This was to begin on 1/21/19, with the goal of providing durable local control in order to protect his airway and cervical vasculature   At this point, today we will initiate a more palliative course of hypo-fractionated treatment, 3000 cGy in 10 fractions, with the goal of arresting further local progression, perhaps shrinking the tumor such that he may be extubated   The prognosis is extremely guarded, and anaplastic carcinoma has been known to grow through radiation therapy, so the possibility of eventual extubation remains uncertain    Dr Abraham Orlando will further discuss with the family on Monday the goals of care and expectations

## 2019-01-24 NOTE — PROGRESS NOTES
Progress note - Palliative and Supportive Care   Brandy Gomez 59 y o  male [id number]    Assessment:  Patient Active Problem List   Diagnosis    Oropharyngeal dysphagia    Type 2 diabetes mellitus without complication, without long-term current use of insulin [facility] )    Essential hypertension    Morbid obesity due to excess calories ([facility] )    Gastroesophageal reflux disease without esophagitis    Anaplastic thyroid carcinoma with metastases    Difficulty in swallowing    Elevated TSH    Accelerated hypertension    Tracheal obstruction    CKD (chronic kidney disease) stage 3, GFR 30-59 ml/min (HCC)    Thyroid cancer ([facility] )    Airway obstruction       Plan:  1  Symptom management -    - precedex and fentanyl gtt per primary team   - seroquel HS   - bowel regimen in place    2  Goals - level 1 full code   - met with patient (alert and able to participate in discussion via writing), wife Zoya Done), and son Char Davis)  - update given by Dr Dilshad Maria pertaining to CT neck results (potential progression of disease and/or radiation response)   - plan to consult ENT regarding risks/benefits of advanced airway as unlikely to successfully extubate     - alternative to advanced airway includes compassionate extubation  - will re-group tomorrow with multi-specialty team to discuss  Family desired to defer formal family meeting at this time  - palliative care LCSW, [employer] continues to provide support     Code Status: Full - Level 1   Decisional apparatus:  Patient is competent on my exam today  If competence is lost, patient's substitute decision maker would default to wife, Lanette Barajas, by [employer] Act 169  Advance Directive / Living Will / POLST:  Advanced directive completed but not on file  I have reviewed the patient's controlled substance dispensing history in the Prescription Drug Monitoring Program in compliance with the [facility] regulations before prescribing any controlled substances        Interval history: No events overnight  Continues to receive chemotherapy and XRT  CT neck completed and reveals enlargement of thyroid mass, possible extension into esophagus  CT chest reveals enlargement of lung masses since interval imaging 12/24  No cuff leak today  Patient alert and communicating with pen/paper  Patient, his wife, and son Samantha Rivas) updated at bedside       MEDICATIONS / ALLERGIES:     all current active meds have been reviewed and current meds:   Current Facility-Administered Medications   Medication Dose Route Frequency    acetaminophen (TYLENOL) tablet 975 mg  975 mg Oral Q8H PRN    amLODIPine (NORVASC) tablet 5 mg  5 mg Oral Daily    bisacodyl (DULCOLAX) rectal suppository 10 mg  10 mg Rectal Daily    chlorhexidine (PERIDEX) 0 12 % oral rinse 15 mL  15 mL Swish & Spit Q12H [facility]    cloNIDine (CATAPRES) tablet 0 3 mg  0 3 mg Oral 4x Daily    dexmedetomidine (PRECEDEX) 200 mcg in sodium chloride 0 9 % 50 mL infusion  0 1-0 7 mcg/kg/hr Intravenous Titrated    [START ON 1/28/2019] DOXOrubicin (ADRIAMYCIN) injection 20 mg  20 mg Intravenous Once    [START ON 2/4/2019] DOXOrubicin (ADRIAMYCIN) injection 20 mg  20 mg Intravenous Once    enoxaparin (LOVENOX) subcutaneous injection 40 mg  40 mg Subcutaneous Daily    fentaNYL 1250 mcg in sodium chloride 0 9% 125mL drip  75 mcg/hr Intravenous Continuous    fentanyl citrate (PF) 100 MCG/2ML 50 mcg  50 mcg Intravenous Q1H PRN    hydrALAZINE (APRESOLINE) injection 10 mg  10 mg Intravenous Q6H PRN    insulin glargine (LANTUS) subcutaneous injection 50 Units 0 5 mL  50 Units Subcutaneous Q12H [facility]    insulin regular (HumuLIN R,NovoLIN R) 1 Units/mL in sodium chloride 0 9 % 100 mL infusion  0 3-21 Units/hr Intravenous Titrated    lisinopril (ZESTRIL) tablet 20 mg  20 mg Oral Daily    metoprolol tartrate (LOPRESSOR) tablet 100 mg  100 mg Oral Q12H [facility]    omeprazole (PRILOSEC) suspension 2 mg/mL  20 mg Oral Daily    ondansetron (ZOFRAN) 16 mg, dexamethasone (DECADRON) 10 mg in sodium chloride 0 9 % 50 mL IVPB   Intravenous Q7 Days    polyethylene glycol (MIRALAX) packet 17 g  17 g Oral Daily    QUEtiapine (SEROquel) tablet 25 mg  25 mg Oral BID    senna-docusate sodium (SENOKOT S) 8 6-50 mg per tablet 2 tablet  2 tablet Oral BID       No Known Allergies    OBJECTIVE:    Physical Exam  Physical Exam   Constitutional: He appears well-developed  Intubated but patient awake, alert, in no distress  Writing messages to family at bedside  HENT:   Head: Normocephalic and atraumatic  Eyes: Conjunctivae are normal    Neck:   Incision from trach attempt CDI   Pulmonary/Chest:   Intubated on pressure support  No cuff leak  Abdominal: He exhibits no distension  Neurological: He is alert  Skin: Skin is warm and dry  Anterior cervical incision CDI  Psychiatric:   Appropriate mood and affect  Lab Results:   I have personally reviewed pertinent labs  , CBC:   Lab Results   Component Value Date    WBC 4 66 01/24/2019    HGB 10 0 (L) 01/24/2019    HCT 29 8 (L) 01/24/2019    MCV 93 01/24/2019     (L) 01/24/2019    MCH 31 3 01/24/2019    MCHC 33 6 01/24/2019    RDW 12 8 01/24/2019    MPV 9 7 01/24/2019    NRBC 0 01/24/2019   , CMP:   Lab Results   Component Value Date    SODIUM 133 (L) 01/24/2019    K 4 2 01/24/2019    CL 98 (L) 01/24/2019    CO2 29 01/24/2019    BUN 25 01/24/2019    CREATININE 1 06 01/24/2019    CALCIUM 8 3 01/24/2019    AST 22 01/24/2019    ALT 44 01/24/2019    ALKPHOS 87 01/24/2019    EGFR 75 01/24/2019     Imaging Studies: reviewed CT neck and CT chest today  Progression of disease demonstrated  EKG, Pathology, and Other Studies:   Surgical pathology consistent with anaplastic thyroid CA    Counseling / Coordination of Care  Total floor / unit time spent today 35+ minutes  Greater than 50% of total time was spent with the patient and / or family counseling and / or coordination of care   A description of the counseling / coordination of care: time spent with patient, family, communication with critical care team

## 2019-01-24 NOTE — RESPIRATORY THERAPY NOTE
RT Ventilator Management Note  Tavo Hy 59 y o  male MRN: [id number]  [id number]      Daily Screen       1/20/2019 0900 1/22/2019 0740          Patient safety screen outcome[de-identified] Failed Passed      Not Ready for Weaning due to[de-identified] Underline problem not resolved -      Spont breathing trial % for 30 min: - Yes              Physical Exam:   Assessment Type: Assess only  General Appearance: Awake  Respiratory Pattern: Assisted  Chest Assessment: Chest expansion symmetrical  Bilateral Breath Sounds: Diminished, Coarse  Cough: None  Suction: ET Tube  O2 Device: Ventilator      Resp Comments: Pt remained on ACVC vent settings overnight  No changes made  Will continue to monitor

## 2019-01-24 NOTE — RESPIRATORY THERAPY NOTE
RT Ventilator Management Note  Ashok Hollis 59 y o  male MRN: [id number]  Unit/Bed#: MICU 04 Encounter: [id number]      Daily Screen       1/22/2019 0740 1/24/2019 0745          Patient safety screen outcome[de-identified] Passed Passed      Spont breathing trial % for 30 min: Yes Yes              Physical Exam:   Assessment Type: Assess only  General Appearance: Alert, Awake  Respiratory Pattern: Assisted  Chest Assessment: Chest expansion symmetrical  Bilateral Breath Sounds: Diminished  Cough: Productive, Strong  Suction: ET Tube  O2 Device: vent  Subjective Data: Pt gives thumbs up when asked if breathing is ok  Resp Comments: Pt cuff deflated while on AC settings  No air leak while ETT center of mouth  However, when ETT moved to left of mouth small air leak can be heard and auscultated on left and right of neck equally and 30mL VT volume loss can be seen on [facility] settings  Pt placed on PS wean and is tolerating well  Will continue to monitor

## 2019-01-24 NOTE — PROGRESS NOTES
Progress Note - Critical Care   Antonio Trejo 59 y o  male MRN: [id number]  Unit/Bed#: MICU 04 Encounter: [id number]    Attending Physician: Cleo Ramires, DO      ______________________________________________________________________  Assessment and Plan:   Principal Problem:    Anaplastic thyroid carcinoma with metastases  Active Problems:    Difficulty in swallowing    Airway obstruction    Type 2 diabetes mellitus without complication, without long-term current use of insulin ([facility])    Elevated TSH    Essential hypertension    Gastroesophageal reflux disease without esophagitis    CKD (chronic kidney disease) stage 3, GFR 30-59 ml/min ([facility])  Resolved Problems:    * No resolved hospital problems  *        Neuro:   Sedation/analgesia:  Precedex at 1, fentanyl at 75, fentanyl 50 q 1 hour p r n  Encephalopathy:  Suspect ICU delirium  Currently CAM ICU negative  Continue Seroquel and Haldol p r n  Sleep hygiene, avoid benzos    CV:   Hypertension:  Continue clonidine 0 3 mg 4 times daily, metoprolol 100 q 12 , amlodipine 5 mg daily, lisinopril 20 mg daily  P r n  Hydralazine    Pulm:   Respiratory failure:  Check for cuff leak  Placed back on pressure support ventilation  Will obtain CT of the neck today  Continue radiation/Adriamycin as per heme onc/radiation onc    GI:   Continue tube feeds, omeprazole  Bowel regimen:  Senokot    :   Stable, urinary retention protocol    F/E/N:   Replete electrolytes as needed    ID:   Leukocytosis:  Procalcitonin was slightly elevated at 0 46  Patient has been afebrile  Monitor WBC/temps  Heme:  Trend    Endo:  Continue Lantus and insulin drip  Received 214 units of insulin in the last 24 hours despite increasing Lantus to 50 q 12     Msk/Skin:  Frequent repositioning assess daily for skin breakdown    Disposition:  Continue ICU care    Code Status: Level 1 - Full Code    Counseling / Coordination of Care  Total time spent today 0 minutes   Greater than 50% of total time was spent with the patient and / or family counseling and / or coordination of care  A description of the counseling / coordination of care: 0  ______________________________________________________________________    Chief Complaint:  None    24 Hour Events:   · Hemodynamically stable/afebrile overnight  · Plan for radiation today at 1300  · Patient was rested on assist-control overnight at 14/ 50/60%/5  · CAM ICU negative this morning after the initiation of Seroquel and Haldol and optimizing sleep hygiene  · Started on Decadron yesterday for absence of cuff leak    Review of Systems   Unable to perform ROS: Intubated     ______________________________________________________________________    Physical Exam:   Physical Exam   Constitutional: He appears well-developed  HENT:   Head: Normocephalic  Eyes: Pupils are equal, round, and reactive to light  Neck:   Clean intact healing surgical incision   Cardiovascular: Regular rhythm, intact distal pulses and normal pulses  Pulmonary/Chest: Effort normal and breath sounds normal    Abdominal: Normal appearance and bowel sounds are normal    Peg in place   Musculoskeletal: Normal range of motion  Neurological: He is alert  GCS eye subscore is 4  GCS verbal subscore is 1  GCS motor subscore is 6  Cam-icu negative   Skin: Skin is warm and dry  Capillary refill takes less than 2 seconds  Psychiatric: He has a normal mood and affect  Nursing note and vitals reviewed        ______________________________________________________________________  Vitals:    19 4343 19 0500 19 0524 19 0600   BP:  (!) 177/82 155/82 155/76   BP Location:       Pulse:  70  66   Resp:       Temp:       TempSrc:       SpO2:  94%  93%   Weight: 102 kg (223 lb 15 8 oz)      Height:           Temperature:   Temp (24hrs), Av °F (37 2 °C), Min:98 2 °F (36 8 °C), Max:100 °F (37 8 °C)    Current Temperature: 98 3 °F (36 8 °C)  Weights:   IBW: 73 kg Body mass index is 32 14 kg/m²  Weight (last 2 days)     Date/Time   Weight    01/24/19 0458  102 (223 99)    01/23/19 0600  100 (220 46)    01/22/19 0552  104 (230 38)            Hemodynamic Monitoring:  N/A     Non-Invasive/Invasive Ventilation Settings:  Respiratory    Lab Data (Last 4 hours)    None         O2/Vent Data (Last 4 hours)      01/24 0349           Vent Mode AC/VC       Resp Rate (BPM) (BPM) 14       Vt (mL) (mL) 450       FIO2 (%) (%) 60       PEEP (cmH2O) (cmH2O) 5       MV 13                 No results found for: PHART, PNH1XUF, PO2ART, WSP2XAY, E9ARMYUD, BEART, SOURCE  SpO2: SpO2: 93 %  Intake and Outputs:  I/O       01/22 0701 - 01/23 0700 01/23 0701 - 01/24 0700 01/24 0701 - 01/25 0700    I V  (mL/kg) 1035 1 (10 4) 1037 5 (10 2)     NG/ 500     Feedings 1823 1609     Total Intake(mL/kg) 3168 1 (31 7) 3146 5 (30 8)     Urine (mL/kg/hr) 1447 (0 6) 2048 (0 8)     Total Output 1447 2048      Net +1721 1 +1098 5             Unmeasured Urine Occurrence 1 x            Nutrition:        Diet Orders            Start     Ordered    01/21/19 1305  Diet Enteral/Parenteral; Tube Feeding No Oral Diet; Jevity 1 2 Rip; Continuous; 81  Diet effective now     Comments:  Start at 10 ml/hr and advance to goal as tolerated   Question Answer Comment   Diet Type Enteral/Parenteral    Enteral/Parenteral Tube Feeding No Oral Diet    Tube Feeding Formula: Jevity 1 2 Rip    Bolus/Cyclic/Continuous Continuous    Tube Feeding Goal Rate (mL/hr): 81    RD to adjust diet per protocol?  Yes        01/21/19 1305          Labs:     Results from last 7 days  Lab Units 01/23/19  0619 01/22/19  0423 01/21/19  0423 01/20/19  0529 01/19/19  0500 01/18/19  2255 01/18/19  0432 01/17/19  1933   WBC Thousand/uL 6 69 7 45 8 37 6 96 10 33*  --  8 24 10 68*   HEMOGLOBIN g/dL 10 5* 10 5* 10 9* 12 0 10 7*  --  12 7 13 4   HEMATOCRIT % 31 8* 31 5* 32 4* 34 9* 30 8*  --  36 9 38 6   PLATELETS Thousands/uL 156 133* 144* 164 202 218 210 247   NEUTROS PCT % 75 85*  --   --  83*  --   --  74   BANDS PCT %  --   --   --   --   --   --  1  --    MONOS PCT % 14* 9  --   --  6  --   --  10   MONO PCT %  --   --   --   --   --   --  2*  --        Results from last 7 days  Lab Units 01/23/19  0619 01/22/19 0423 01/21/19  0423 01/20/19  0529 01/19/19  0500 01/18/19  0432 01/17/19  1933   SODIUM mmol/L 136 137 136 132* 134* 136 136   POTASSIUM mmol/L 3 8 4 2 3 6 3 8 4 1 4 1 3 6   CHLORIDE mmol/L 102 103 101 99* 99* 97* 96*   CO2 mmol/L 26 29 30 25 20* 24 25   ANION GAP mmol/L 8 5 5 8 15* 15* 15*   BUN mg/dL 21 19 20 25 29* 19 19   CREATININE mg/dL 1 19 1 26 1 38* 1 33* 1 43* 1 24 1 38*   CALCIUM mg/dL 8 4 7 9* 7 9* 7 9* 8 1* 9 1 9 9   ALT U/L  --   --   --   --   --   --  15   AST U/L  --   --   --   --   --   --  16   ALK PHOS U/L  --   --   --   --   --   --  86   ALBUMIN g/dL  --   --   --   --   --   --  3 3*   TOTAL BILIRUBIN mg/dL  --   --   --   --   --   --  1 19*       Results from last 7 days  Lab Units 01/23/19  0619 01/22/19 0423 01/21/19 0423 01/20/19  0529 01/19/19  0500   MAGNESIUM mg/dL 2 2 2 3 2 2 2 2 2 2   PHOSPHORUS mg/dL  --   --   --  2 3 4 4*                  ABG:  Lab Results   Component Value Date    PHART 7 414 01/18/2019    [id number] 32 5 (L) 01/18/2019    PO2ART 103 1 01/18/2019    PDP0GWT 20 3 (L) 01/18/2019    BEART -3 3 01/18/2019    SOURCE Radial, Right 01/18/2019     VBG:  Results from last 7 days  Lab Units 01/18/19  1341   ABG SOURCE  Radial, Right       Results from last 7 days  Lab Units 01/23/19  1231   PROCALCITONIN ng/ml 0 49*     No results found for: [facility]     Micro:       Allergies: No Known Allergies  Medications:   Scheduled Meds:  Current Facility-Administered Medications:  acetaminophen 975 mg Oral Q8H PRN Mila Maldonado DO    amLODIPine 5 mg Oral Daily Wilhemenia Spatz, PA-C    chlorhexidine 15 mL Swish & Spit Q12H [facility] Nasra Lacey MD    cloNIDine 0 3 mg Oral 4x Daily Enzo Dumont PA-C dexamethasone 10 mg Intravenous [facility] Kathleen Caceres PA-C    dexmedetomidine 0 1-1 mcg/kg/hr Intravenous Titrated Duyen Night, ROSALINE Last Rate: 1 mcg/kg/hr (01/24/19 5276)   [START ON 1/28/2019] DOXOrubicin 20 mg Intravenous Once Jamse Piper, DO    [START ON 2/4/2019] DOXOrubicin 20 mg Intravenous Once Ronniese Piper, DO    fentaNYL 75 mcg/hr Intravenous Continuous Moose Braden MD Last Rate: 75 mcg/hr (01/24/19 0148)   fentanyl citrate (PF) 50 mcg Intravenous Q1H PRN Duyen Night, ROSALINE    heparin (porcine) 5,000 Units Subcutaneous [facility] Richard Oliva MD    hydrALAZINE 10 mg Intravenous Q6H PRN Duyen Night, ROSALINE    insulin glargine 50 Units Subcutaneous Q12H [address] Kathleen Caceres PA-C    insulin regular (HumuLIN R,NovoLIN R) infusion 0 3-21 Units/hr Intravenous Titrated JETHRO Valladares Last Rate: 14 Units/hr (01/24/19 8044)   lisinopril 20 mg Oral Daily Kathleen Caceres PA-C    metoprolol tartrate 100 mg Oral Q12H [address] Duyen Night, ROSALINE    omeprazole (PRILOSEC) suspension 2 mg/mL 20 mg Oral Daily Duyen Night, ROSALINE    ondansetron with dexamethasone IVPB  Intravenous Q7 Days Oskar Saldaña, DO Last Rate: Stopped (01/21/19 7605)   polyethylene glycol 17 g Oral Daily Duyen Night, ROSALINE    QUEtiapine 25 mg Oral BID Kathleen Caceres PA-C    senna-docusate sodium 2 tablet Oral BID Duyen Night, ROSALINE      Continuous Infusions:  dexmedetomidine 0 1-1 mcg/kg/hr Last Rate: 1 mcg/kg/hr (01/24/19 1070)   fentaNYL 75 mcg/hr Last Rate: 75 mcg/hr (01/24/19 0148)   insulin regular (HumuLIN R,NovoLIN R) infusion 0 3-21 Units/hr Last Rate: 14 Units/hr (01/24/19 2832)     PRN Meds:    acetaminophen 975 mg Q8H PRN   fentanyl citrate (PF) 50 mcg Q1H PRN   hydrALAZINE 10 mg Q6H PRN     VTE Pharmacologic Prophylaxis: Heparin  VTE Mechanical Prophylaxis: sequential compression device  Invasive lines and devices:   Invasive Devices     Central Venous Catheter Line            Port A Cath 01/21/19 Right Chest 2 days          Peripheral Intravenous Line            Peripheral IV 01/22/19 Right Antecubital 2 days          Drain            Gastrostomy/Enterostomy Percutaneous endoscopic gastrostomy (PEG) 20 Fr  LUQ 26 days          Airway            ETT  Cuffed;Oral;Straight; Inflated 8 mm 5 days                     Portions of the record may have been created with voice recognition software  Occasional wrong word or "sound a like" substitutions may have occurred due to the inherent limitations of voice recognition software  Read the chart carefully and recognize, using context, where substitutions have occurred      Bill Cristobal

## 2019-01-24 NOTE — PROGRESS NOTES
01/24/19 0900   Stress Factors   Patient Stress Factors Health changes   Family Stress Factors Health changes   Coping Responses   Patient Coping Anxiety; Non-verbal   Family Coping Anxiety;Open/discussion   Plan of Care   Comments Talked with family and with pt   cultivated a relationship of care and support  Listened empathically  Explored emotional needs and resources  Talked with family about support  Encouraged self-care  Provided anticipatory guidance to family  Enoucraged sense of hope as pt doing better  Provided prayer  Emotional resources utilized  Expressed hope  Spiritual resources utilized    Verbally processed emotions   Assessment Completed by: Unit visit

## 2019-01-25 NOTE — PROGRESS NOTES
01/25/19 1200   Stress Factors   Family Stress Factors Loss of control   Coping Responses   Family Coping Anxiety;Open/discussion   Plan of Care   Comments Talked with pt and wife  Cultivated a relationship of care and support  Listened empathically  Provided anxiety containment-pt and wife dealing with unknown diagnosis  Emotional resources utilized  Spiritual resources utilized   Provided prayer   Assessment Completed by: Unit visit

## 2019-01-25 NOTE — RESPIRATORY THERAPY NOTE
RT Ventilator Management Note  Tavo Nathan 59 y o  male MRN: [id number]  [facility] [id number]      Daily Screen       1/22/2019 0740 1/24/2019 0745          Patient safety screen outcome[de-identified] Passed Passed      Spont breathing trial % for 30 min: Yes Yes              Physical Exam:   Assessment Type: (P) Assess only  General Appearance: (P) Awake, Alert  Respiratory Pattern: (P) Assisted  Chest Assessment: (P) Chest expansion symmetrical  Bilateral Breath Sounds: (P) Diminished  R Breath Sounds: (P) Diminished, Clear  L Breath Sounds: (P) Diminished, Clear  Cough: Productive, Strong  Suction: ET Tube  O2 Device: vent  Subjective Data: Pt gives thumbs up when asked if breathing is ok  Resp Comments: (P) Pt remains on PSV and is tolerating well  VS are stable @ this time and pt appears comfortable on current settings  Will continue to monitor through out the shift

## 2019-01-25 NOTE — PROGRESS NOTES
Progress Note - Critical Care   Jory Red 59 y o  male MRN: [id number]  Unit/Bed#: MICU 04 Encounter: [id number]    Attending Physician: Viktoria Davenport, DO      ______________________________________________________________________  Assessment and Plan:   Principal Problem:    Anaplastic thyroid carcinoma with metastases  Active Problems:    Difficulty in swallowing    Airway obstruction    Type 2 diabetes mellitus without complication, without long-term current use of insulin ([facility]CC)    Elevated TSH    Essential hypertension    Gastroesophageal reflux disease without esophagitis    CKD (chronic kidney disease) stage 3, GFR 30-59 ml/min ([facility])  Resolved Problems:    * No resolved hospital problems  *        Neuro:   Sedation/analgesia:  Precedex at 0 7, fentanyl at 75, fentanyl 50 Q 1 p r n     Attempt to the wean Precedex  Consider discontinuation of fentanyl infusion  Encephalopathy:  Completely resolved  CAM ICU negative  Consider decreasing Seroquel dosing to daily HS  Continue to optimize sleep hygiene and avoid benzos    CV:   Hypertension:  Continue clonidine 0 3 mg 4 times daily, metoprolol 100 q 12, amlodipine 5 mg daily, lisinopril 20 mg daily  Prn hydralazine    Pulm:   Respiratory failure:  Patient unable to be extubated secondary to encroaching thyroid mass circumferentially around endotracheal tube  ENT advises that tracheostomy is high risk and not suggested at this present time  Continue radiation/Adriamycin as per Heme-Onc/radiation onc  Palliative care following to assist in the difficult discussions surrounding goals of care with the patient and the patient's family  Maintain head of bed above 30°, chlorhexidine    GI:   Continue tube feeds, omeprazole  Bowel regimen:  Senokot, MiraLax, Dulcolax suppository    :   Urinary retention protocol, bladder scans p r n     F/E/N:  Replete electrolytes as needed    ID:  No obvious infection    Trend WBC/temps  3+ beta hemolytic strep on nasal culture :  Suspect to be colonizer  Not actively treating    Heme:  Stable    Endo:  Continue Lantus/insulin sliding scale  Received 284 units of insulin over the last 24 hr     Msk/Skin:  Frequent repositioning assess daily for his in breakdown    Disposition:  Continue ICU care    Code Status: Level 1 - Full Code    Counseling / Coordination of Care  Total Critical Care time spent 0 minutes excluding procedures, teaching and family updates  ______________________________________________________________________    Chief Complaint: none    24 Hour Events:   CT of the neck was performed yesterday which showed continued circumferential tumor encroaching around the endotracheal tube that is slightly bigger with a larger pulmonary mass present  Remains intubated due for airway protection  Hemodynamically stable/afebrile    Review of Systems   Unable to perform ROS: Intubated     ______________________________________________________________________    Physical Exam:   Physical Exam   Constitutional: He is oriented to person, place, and time  He appears well-developed  HENT:   Head: Normocephalic  Eyes: Pupils are equal, round, and reactive to light  Neck: No JVD present  Cardiovascular: Normal rate  Pulmonary/Chest: Effort normal    Abdominal: Soft  Musculoskeletal: Normal range of motion  Neurological: He is alert and oriented to person, place, and time  Skin: Skin is warm and dry  Capillary refill takes less than 2 seconds     Psychiatric: He has a normal mood and affect        ______________________________________________________________________  Vitals:    19 0502 19 0530 19 0536 19 0600   BP: 152/76 152/79  163/82   Pulse:  70  62   Resp:    (!) 26   Temp:       TempSrc:       SpO2:  97%  97%   Weight:   102 kg (223 lb 15 8 oz)    Height:           Temperature:   Temp (24hrs), Av °F (37 2 °C), Min:98 5 °F (36 9 °C), Max:99 5 °F (37 5 °C)    Current Temperature: 99 °F (37 2 °C)  Weights:   IBW: 73 kg    Body mass index is 32 14 kg/m²  Weight (last 2 days)     Date/Time   Weight    01/25/19 0536  102 (223 99)    01/24/19 0458  102 (223 99)    01/23/19 0600  100 (220 46)            Hemodynamic Monitoring:  N/A     Non-Invasive/Invasive Ventilation Settings:  Respiratory    Lab Data (Last 4 hours)    None         O2/Vent Data (Last 4 hours)    None              No results found for: PHART, WXI5DCR, PO2ART, IKO3GMN, L6TEYGDZ, BEART, SOURCE  SpO2: SpO2: 97 %  Intake and Outputs:  I/O       01/23 0701 - 01/24 0700 01/24 0701 - 01/25 0700 01/25 0701 - 01/26 0700    I V  (mL/kg) 1037 5 (10 2) 947 8 (9 3)     NG/ 200     Feedings 1609 1721     Total Intake(mL/kg) 3146 5 (30 8) 2868 8 (28 1)     Urine (mL/kg/hr) 2048 (0 8) 1175 (0 5)     Total Output 2048 1175      Net +1098 5 +1693 8                   Nutrition:        Diet Orders            Start     Ordered    01/21/19 1305  Diet Enteral/Parenteral; Tube Feeding No Oral Diet; Jevity 1 2 Rip; Continuous; 81  Diet effective now     Comments:  Start at 10 ml/hr and advance to goal as tolerated   Question Answer Comment   Diet Type Enteral/Parenteral    Enteral/Parenteral Tube Feeding No Oral Diet    Tube Feeding Formula: Jevity 1 2 Rip    Bolus/Cyclic/Continuous Continuous    Tube Feeding Goal Rate (mL/hr): 81    RD to adjust diet per protocol?  Yes        01/21/19 1305          Labs:     Results from last 7 days  Lab Units 01/25/19  0514 01/24/19  0829 01/23/19  0619 01/22/19  0423 01/21/19  0423 01/20/19  0529 01/19/19  0500   WBC Thousand/uL 6 29 4 66 6 69 7 45 8 37 6 96 10 33*   HEMOGLOBIN g/dL 10 3* 10 0* 10 5* 10 5* 10 9* 12 0 10 7*   HEMATOCRIT % 30 5* 29 8* 31 8* 31 5* 32 4* 34 9* 30 8*   PLATELETS Thousands/uL 160 142* 156 133* 144* 164 202   NEUTROS PCT % 85* 87* 75 85*  --   --  83*   MONOS PCT % 7 5 14* 9  --   --  6       Results from last 7 days  Lab Units 01/25/19  0514 01/24/19  0829 01/23/19  2795 01/22/19  0423 01/21/19  0423 01/20/19  0529 01/19/19  0500   SODIUM mmol/L 137 133* 136 137 136 132* 134*   POTASSIUM mmol/L 4 1 4 2 3 8 4 2 3 6 3 8 4 1   CHLORIDE mmol/L 100 98* 102 103 101 99* 99*   CO2 mmol/L 29 29 26 29 30 25 20*   ANION GAP mmol/L 8 6 8 5 5 8 15*   BUN mg/dL 27* 25 21 19 20 25 29*   CREATININE mg/dL 0 92 1 06 1 19 1 26 1 38* 1 33* 1 43*   CALCIUM mg/dL 8 2* 8 3 8 4 7 9* 7 9* 7 9* 8 1*   ALT U/L  --  44  --   --   --   --   --    AST U/L  --  22  --   --   --   --   --    ALK PHOS U/L  --  87  --   --   --   --   --    ALBUMIN g/dL  --  2 2*  --   --   --   --   --    TOTAL BILIRUBIN mg/dL  --  0 38  --   --   --   --   --        Results from last 7 days  Lab Units 01/25/19  0514 01/24/19  0829 01/23/19  0619 01/22/19  0423 01/21/19  0423 01/20/19  0529 01/19/19  0500   MAGNESIUM mg/dL 2 1  --  2 2 2 3 2 2 2 2 2 2   PHOSPHORUS mg/dL 2 7 2 8  --   --   --  2 3 4 4*                  ABG:  Lab Results   Component Value Date    PHART 7 414 01/18/2019    [id number] 32 5 (L) 01/18/2019    PO2ART 103 1 01/18/2019    AXN8WLM 20 3 (L) 01/18/2019    BEART -3 3 01/18/2019    SOURCE Radial, Right 01/18/2019     VBG:  Results from last 7 days  Lab Units 01/18/19  1341   ABG SOURCE  Radial, Right       Results from last 7 days  Lab Units 01/23/19  1231   PROCALCITONIN ng/ml 0 49*     [facility] found [facility]: [facility]     Micro:       Allergies: No Known Allergies  Medications:   Scheduled Meds:  Current Facility-Administered Medications:  acetaminophen 975 mg Oral Q8H PRN Ander Mat, DO    amLODIPine 5 mg Oral Daily Vikitodd Aguilar PA-C    bisacodyl 10 mg Rectal Daily Adryan Benitez PA-C    chlorhexidine 15 mL Swish & Spit Q12H [address] Gisel Corea MD    cloNIDine 0 3 mg Oral 4x Daily Lakeisha Moreno PA-C    dexmedetomidine 0 1-0 7 mcg/kg/hr Intravenous Titrated Adryan Benitez PA-C Last Rate: 0 7 mcg/kg/hr (01/25/19 0136)   [START ON 1/28/2019] DOXOrubicin 20 mg Intravenous Once Dianna Stark DO [START ON 2/4/2019] DOXOrubicin 20 mg Intravenous Once Mertie Ode, DO    enoxaparin 40 mg Subcutaneous Daily [place] ROSALINE ARMAS    fentaNYL 75 mcg/hr Intravenous Continuous Jory Henry MD Last Rate: 75 mcg/hr (01/24/19 2022)   fentanyl citrate (PF) 50 mcg Intravenous Q1H PRN Amadeo Boston PA-C    hydrALAZINE 10 mg Intravenous Q6H PRN Amadeo Boston PA-C    insulin glargine 50 Units Subcutaneous Q12H [facility] ROSALINE Cohn    insulin regular (HumuLIN R,NovoLIN R) infusion 0 3-21 Units/hr Intravenous Titrated JETHRO Hinojosa Last Rate: 8 Units/hr (01/25/19 3023)   lisinopril 20 mg Oral Daily ROSALINE Cohn    metoprolol tartrate 100 mg Oral Q12H [facility] Amadeo Boston PA-C    omeprazole (PRILOSEC) suspension 2 mg/mL 20 mg Oral Daily Amadeo Boston PA-C    ondansetron with dexamethasone IVPB  Intravenous Q7 Days Mertie Ode, DO Last Rate: Stopped (01/21/19 1515)   polyethylene glycol 17 g Oral Daily Amadeo Boston PA-C    QUEtiapine 25 mg Oral BID ROSALINE Cohn    senna-docusate sodium 2 tablet Oral BID Amadeo Boston PA-C      Continuous Infusions:  dexmedetomidine 0 1-0 7 mcg/kg/hr Last Rate: 0 7 mcg/kg/hr (01/25/19 0136)   fentaNYL 75 mcg/hr Last Rate: 75 mcg/hr (01/24/19 2022)   insulin regular (HumuLIN R,NovoLIN R) infusion 0 3-21 Units/hr Last Rate: 8 Units/hr (01/25/19 2167)     PRN Meds:    acetaminophen 975 mg Q8H PRN   fentanyl citrate (PF) 50 mcg Q1H PRN   hydrALAZINE 10 mg Q6H PRN     VTE Pharmacologic Prophylaxis: Enoxaparin (Lovenox)  VTE Mechanical Prophylaxis: sequential compression device  Invasive lines and devices:   Invasive Devices     Central Venous Catheter Line            Port A Cath 01/21/19 Right Chest 3 days          Peripheral Intravenous Line            Peripheral IV 01/22/19 Right Antecubital 3 days    Peripheral IV 01/24/19 Right Hand less than 1 day          Drain            Gastrostomy/Enterostomy Percutaneous endoscopic gastrostomy (PEG) 20 Fr  LUQ 27 days          Airway            ETT  Cuffed;Oral;Straight; Inflated 8 mm 6 days                     Portions of the record may have been created with voice recognition software  Occasional wrong word or "sound a like" substitutions may have occurred due to the inherent limitations of voice recognition software  Read the chart carefully and recognize, using context, where substitutions have occurred      [place], [place]

## 2019-01-25 NOTE — PLAN OF CARE
DISCHARGE PLANNING     Discharge to home or other facility with appropriate resources Progressing        DISCHARGE PLANNING - CARE MANAGEMENT     Discharge to post-acute care or home with appropriate resources Progressing        INFECTION - ADULT     Absence or prevention of progression during hospitalization Progressing     Absence of fever/infection during neutropenic period Progressing        Knowledge Deficit     Patient/family/caregiver demonstrates understanding of disease process, treatment plan, medications, and discharge instructions Progressing        METABOLIC, FLUID AND ELECTROLYTES - ADULT     Electrolytes maintained within normal limits Progressing     Fluid balance maintained Progressing     Glucose maintained within target range Progressing        Nutrition/Hydration-ADULT     Nutrient/Hydration intake appropriate for improving, restoring or maintaining nutritional needs Progressing        PAIN - ADULT     Verbalizes/displays adequate comfort level or baseline comfort level Progressing        Potential for Falls     Patient will remain free of falls Progressing        Prexisting or High Potential for Compromised Skin Integrity     Skin integrity is maintained or improved Progressing        RESPIRATORY - ADULT     Achieves optimal ventilation and oxygenation Progressing        SAFETY ADULT     Patient will remain free of falls Progressing     Maintain or return to baseline ADL function Progressing     Maintain or return mobility status to optimal level Progressing

## 2019-01-25 NOTE — RESPIRATORY THERAPY NOTE
RT Ventilator Management Note  Hillis Pallas 59 y o  male MRN: [id number]  [facility] [id number]      Daily Screen       1/22/2019 0740 1/24/2019 0745          Patient safety screen outcome[de-identified] Passed Passed      Spont breathing trial % for 30 min: Yes Yes              Physical Exam:   Assessment Type: Assess only  General Appearance: Alert, Awake  Respiratory Pattern: Assisted  Chest Assessment: Chest expansion symmetrical  O2 Device: vent      Resp Comments: con't on PSV 8/10 70% as ordered, esperanza well, no resp distress, VS stable, no changes at this time, will con't to monitor

## 2019-01-25 NOTE — PROGRESS NOTES
Oncology Progress Note  Julio Pierson 59 y o  male MRN: [id number]  Unit/Bed#: MICU 04 Encounter: [id number]      /73 (BP Location: Left arm)   Pulse 74   Temp 98 9 °F (37 2 °C) (Oral)   Resp (!) 26   Ht 5' 10" (1 778 m)   Wt 102 kg (223 lb 15 8 oz)   SpO2 97%   BMI 32 14 kg/m²     Subjective:  Comfortable    Objective:    General Appearance:    Alert, oriented, intubated        Eyes:    PERRL   Ears:    Normal external ear canals, both ears   Nose:   Nares normal, septum midline   Throat:   Mucosa moist  Pharynx without injection  Neck:   Supple       Lungs:     Clear to auscultation bilaterally   Chest Wall:    No tenderness or deformity    Heart:    Regular rate and rhythm       Abdomen:     Soft, non-tender, bowel sounds +, no organomegaly           Extremities:   Extremities no cyanosis or edema       Skin:   no rash or icterus      Lymph nodes:   Cervical, supraclavicular, and axillary nodes normal            Recent Results (from the past 48 hour(s))   Fingerstick Glucose (POCT)    Collection Time: 01/23/19  8:07 PM   Result Value Ref Range    POC Glucose 193 (H) 65 - 140 mg/dl   Fingerstick Glucose (POCT)    Collection Time: 01/23/19 10:27 PM   Result Value Ref Range    POC Glucose 155 (H) 65 - 140 mg/dl   Fingerstick Glucose (POCT)    Collection Time: 01/23/19 11:57 PM   Result Value Ref Range    POC Glucose 180 (H) 65 - 140 mg/dl   Fingerstick Glucose (POCT)    Collection Time: 01/24/19  2:02 AM   Result Value Ref Range    POC Glucose 170 (H) 65 - 140 mg/dl   Fingerstick Glucose (POCT)    Collection Time: 01/24/19  4:07 AM   Result Value Ref Range    POC Glucose 245 (H) 65 - 140 mg/dl   Fingerstick Glucose (POCT)    Collection Time: 01/24/19  6:06 AM   Result Value Ref Range    POC Glucose 238 (H) 65 - 140 mg/dl   Fingerstick Glucose (POCT)    Collection Time: 01/24/19  8:02 AM   Result Value Ref Range    POC Glucose 249 (H) 65 - 140 mg/dl   CBC and differential    Collection Time: 01/24/19 8:29 AM   Result Value Ref Range    WBC 4 66 4 31 - 10 16 Thousand/uL    RBC 3 19 (L) 3 88 - 5 62 Million/uL    Hemoglobin 10 0 (L) 12 0 - 17 0 g/dL    Hematocrit 29 8 (L) 36 5 - 49 3 %    MCV 93 82 - 98 fL    MCH 31 3 26 8 - 34 3 pg    MCHC 33 6 31 4 - 37 4 g/dL    RDW 12 8 11 6 - 15 1 %    MPV 9 7 8 9 - 12 7 fL    Platelets 959 (L) 292 - 390 Thousands/uL    nRBC 0 /100 WBCs    Neutrophils Relative 87 (H) 43 - 75 %    Immat GRANS % 2 0 - 2 %    Lymphocytes Relative 6 (L) 14 - 44 %    Monocytes Relative 5 4 - 12 %    Eosinophils Relative 0 0 - 6 %    Basophils Relative 0 0 - 1 %    Neutrophils Absolute 4 09 1 85 - 7 62 Thousands/µL    Immature Grans Absolute 0 07 0 00 - 0 20 Thousand/uL    Lymphocytes Absolute 0 27 (L) 0 60 - 4 47 Thousands/µL    Monocytes Absolute 0 22 0 17 - 1 22 Thousand/µL    Eosinophils Absolute 0 00 0 00 - 0 61 Thousand/µL    Basophils Absolute 0 01 0 00 - 0 10 Thousands/µL   Comprehensive metabolic panel    Collection Time: 01/24/19  8:29 AM   Result Value Ref Range    Sodium 133 (L) 136 - 145 mmol/L    Potassium 4 2 3 5 - 5 3 mmol/L    Chloride 98 (L) 100 - 108 mmol/L    CO2 29 21 - 32 mmol/L    ANION GAP 6 4 - 13 mmol/L    BUN 25 5 - 25 mg/dL    Creatinine 1 06 0 60 - 1 30 mg/dL    Glucose 255 (H) 65 - 140 mg/dL    Calcium 8 3 8 3 - 10 1 mg/dL    AST 22 5 - 45 U/L    ALT 44 12 - 78 U/L    Alkaline Phosphatase 87 46 - 116 U/L    Total Protein 6 6 6 4 - 8 2 g/dL    Albumin 2 2 (L) 3 5 - 5 0 g/dL    Total Bilirubin 0 38 0 20 - 1 00 mg/dL    eGFR 75 ml/min/1 73sq m   Phosphorus    Collection Time: 01/24/19  8:29 AM   Result Value Ref Range    Phosphorus 2 8 2 3 - 4 1 mg/dL   Fingerstick Glucose (POCT)    Collection Time: 01/24/19 10:11 AM   Result Value Ref Range    POC Glucose 286 (H) 65 - 140 mg/dl   Fingerstick Glucose (POCT)    Collection Time: 01/24/19 12:13 PM   Result Value Ref Range    POC Glucose 312 (H) 65 - 140 mg/dl   Fingerstick Glucose (POCT)    Collection Time: 01/24/19  2:04 PM Result Value Ref Range    POC Glucose 264 (H) 65 - 140 mg/dl   Fingerstick Glucose (POCT)    Collection Time: 01/24/19  4:00 PM   Result Value Ref Range    POC Glucose 244 (H) 65 - 140 mg/dl   Fingerstick Glucose (POCT)    Collection Time: 01/24/19  6:14 PM   Result Value Ref Range    POC Glucose 206 (H) 65 - 140 mg/dl   Fingerstick Glucose (POCT)    Collection Time: 01/24/19  8:19 PM   Result Value Ref Range    POC Glucose 171 (H) 65 - 140 mg/dl   Fingerstick Glucose (POCT)    Collection Time: 01/24/19 10:01 PM   Result Value Ref Range    POC Glucose 158 (H) 65 - 140 mg/dl   Fingerstick Glucose (POCT)    Collection Time: 01/25/19 12:09 AM   Result Value Ref Range    POC Glucose 129 65 - 140 mg/dl   Fingerstick Glucose (POCT)    Collection Time: 01/25/19  2:02 AM   Result Value Ref Range    POC Glucose 178 (H) 65 - 140 mg/dl   Fingerstick Glucose (POCT)    Collection Time: 01/25/19  4:04 AM   Result Value Ref Range    POC Glucose 201 (H) 65 - 140 mg/dl   CBC and differential    Collection Time: 01/25/19  5:14 AM   Result Value Ref Range    WBC 6 29 4 31 - 10 16 Thousand/uL    RBC 3 30 (L) 3 88 - 5 62 Million/uL    Hemoglobin 10 3 (L) 12 0 - 17 0 g/dL    Hematocrit 30 5 (L) 36 5 - 49 3 %    MCV 92 82 - 98 fL    MCH 31 2 26 8 - 34 3 pg    MCHC 33 8 31 4 - 37 4 g/dL    RDW 12 7 11 6 - 15 1 %    MPV 9 9 8 9 - 12 7 fL    Platelets 698 011 - 047 Thousands/uL    nRBC 0 /100 WBCs    Neutrophils Relative 85 (H) 43 - 75 %    Immat GRANS % 1 0 - 2 %    Lymphocytes Relative 7 (L) 14 - 44 %    Monocytes Relative 7 4 - 12 %    Eosinophils Relative 0 0 - 6 %    Basophils Relative 0 0 - 1 %    Neutrophils Absolute 5 35 1 85 - 7 62 Thousands/µL    Immature Grans Absolute 0 05 0 00 - 0 20 Thousand/uL    Lymphocytes Absolute 0 45 (L) 0 60 - 4 47 Thousands/µL    Monocytes Absolute 0 43 0 17 - 1 22 Thousand/µL    Eosinophils Absolute 0 00 0 00 - 0 61 Thousand/µL    Basophils Absolute 0 01 0 00 - 0 10 Thousands/µL   Basic metabolic panel    Collection Time: 01/25/19  5:14 AM   Result Value Ref Range    Sodium 137 136 - 145 mmol/L    Potassium 4 1 3 5 - 5 3 mmol/L    Chloride 100 100 - 108 mmol/L    CO2 29 21 - 32 mmol/L    ANION GAP 8 4 - 13 mmol/L    BUN 27 (H) 5 - 25 mg/dL    Creatinine 0 92 0 60 - 1 30 mg/dL    Glucose 207 (H) 65 - 140 mg/dL    Calcium 8 2 (L) 8 3 - 10 1 mg/dL    eGFR 89 ml/min/1 73sq m   Magnesium    Collection Time: 01/25/19  5:14 AM   Result Value Ref Range    Magnesium 2 1 1 6 - 2 6 mg/dL   Phosphorus    Collection Time: 01/25/19  5:14 AM   Result Value Ref Range    Phosphorus 2 7 2 3 - 4 1 mg/dL   Fingerstick Glucose (POCT)    Collection Time: 01/25/19  6:01 AM   Result Value Ref Range    POC Glucose 160 (H) 65 - 140 mg/dl   Fingerstick Glucose (POCT)    Collection Time: 01/25/19  8:01 AM   Result Value Ref Range    POC Glucose 148 (H) 65 - 140 mg/dl   Fingerstick Glucose (POCT)    Collection Time: 01/25/19  9:57 AM   Result Value Ref Range    POC Glucose 92 65 - 140 mg/dl   Fingerstick Glucose (POCT)    Collection Time: 01/25/19 12:10 PM   Result Value Ref Range    POC Glucose 109 65 - 140 mg/dl   ECG 12 lead    Collection Time: 01/25/19  3:19 PM   Result Value Ref Range    Ventricular Rate 81 BPM    Atrial Rate 81 BPM    WV Interval 133 ms    QRSD Interval 88 ms    QT Interval 358 ms    QTC Interval 416 ms    P Axis 75 degrees    QRS Axis -48 degrees    T Wave Axis 61 degrees   Fingerstick Glucose (POCT)    Collection Time: 01/25/19  3:35 PM   Result Value Ref Range    POC Glucose 66 65 - 140 mg/dl   Fingerstick Glucose (POCT)    Collection Time: 01/25/19  4:00 PM   Result Value Ref Range    POC Glucose 102 65 - 140 mg/dl         Xr Chest Portable    Result Date: 1/23/2019  Narrative: CHEST INDICATION:   critically ill  COMPARISON:  1/21/2019 EXAM PERFORMED/VIEWS:  XR CHEST PORTABLE FINDINGS: Stable cardiac mediastinal silhouette  Stable ET tube, tip above the norma  Right chest wall port   Persistent left basilar opacity and pleural effusion  Improving right basilar atelectasis/infiltrate  Osseous structures appear within normal limits for patient age  Impression: Persistent left basilar opacity and pleural effusion  Improving right basilar atelectasis/infiltrate  Workstation performed: [id number]     Xr Chest Portable    Result Date: 1/21/2019  Narrative: CHEST INDICATION:   fever  COMPARISON:  1/18/2019 EXAM PERFORMED/VIEWS:  XR CHEST PORTABLE FINDINGS: The cardiac silhouette is without change from the prior study  Endotracheal tube remains in the region of the thoracic inlet with advancement of approximately 3 cm recommended  [id number] is unchanged in position  There is no evidence of pneumothorax  Left basilar airspace disease and left pleural effusion as well as right basilar airspace disease noted, with mild worsening on the left     Impression: 1  Endotracheal tube tip at the thoracic inlet  Advancement of approximately 3 cm recommended 2  Persistent left basilar airspace disease with left effusion mildly worsened since prior study  Unchanged right basilar airspace disease The examination demonstrates a significant  finding and was documented as such in [facility] for liaison and referring practitioner notification  Workstation performed: [id number]     Ct Soft Tissue Neck Wo Contrast    Result Date: 1/24/2019  Narrative: CT SOFT TISSUE NECK WITHOUT CONTRAST INDICATION:   thyroid tumor  COMPARISON:  January 17, 2019 TECHNIQUE:  Axial, sagittal, and coronal 2D reformatted images were created from the axial source data and submitted for interpretation  Radiation dose length product (DLP) for this visit:  705 38 mGy-cm   This examination, like all CT scans performed in the [facility], was performed utilizing techniques to minimize radiation dose exposure, including the use of iterative  reconstruction and automated exposure control  IMAGE QUALITY:  Diagnostic   FINDINGS: VISUALIZED BRAIN PARENCHYMA:  Normal visualized brain parenchyma  VISUALIZED ORBITS AND PARANASAL SINUSES:  Partial opacification of the left sphenoid sinus, mild desiccation of the right sphenoid sinus with partial opacification of the both maxillary sinuses noted  Endotracheal tube is identified with tip about 2 5 cm from the norma NASAL CAVITY AND NASOPHARYNX:  Mild coaptation of the uvula with the nasopharynx noted SUPRAHYOID NECK:  Normal oropharynx and oral cavity  Normal parapharyngeal and retropharyngeal spaces  Normal tonsillar tissue and epiglottis  Normal infratemporal space  INFRAHYOID NECK:  The larynx is suboptimally assessed due to presence of endotracheal tube  The piriform sinuses are coapted Glottis, subglottic trachea appear unchanged THYROID GLAND:  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 which is inseparable from the esophagus  Patient is status post radiation therapy  Again noted is a heterogenous mass in the left thyroid lobe  This mass measures 5 8 x 5 9 cm  On the previous study this was measuring about 5 3 x 4 4 cm  There is enlargement of the left strap muscles and infiltration of the left strap  There is soft tissue infiltration noted in the anterior aspect of the neck at the level of the thyroid cartilage  Edema and enlargement of the right strap muscle noted A rounded density seen at anterior aspect of the larynx in the superficial soft tissue with small focus of air, measuring 3 cm x 1 2 cm There is inferior and retrosternal extension of this mass at the level of the thoracic inlet  Is seen lying anterior to the trachea There is asymmetric thickening of the left aspect of the esophagus PAROTID AND SUBMANDIBULAR GLANDS: Normal  LYMPH NODES:  Right jugular digastric lymph nodes are seen, measuring about 10 mm, stable Left jugular digastric lymph nodes are seen measuring 1 cm    Level 2B left-sided lymph node measuring about 6 mm level 4 lymph nodes seen on the left side, measuring about 1 6 cm VASCULAR STRUCTURES:  Due to lack of contrast THORACIC INLET:  Bilateral effusion seen, increased from the previous study Left upper lobe and right upper lobe lung nodule seen A left apical lung mass seen within the 2nd satellite nodule, incompletely evaluated BONY STRUCTURES:  No acute compression collapse of the vertebra seen     Impression: Heterogenous mass predominantly involving the left thyroid lobe with retrosternal extension and likely invasion into the esophagus  Postradiation this mass demonstrates surrounding infiltration, edema and mild enlargement  On the current study this mass measures 5 8 x 5 9 cm  And on the previous study this was measuring 5 3 x 5 4 cm the enlargement of this mass on the current study likely due to postradiation changes due to edema and inflammatory changes 4 7 cm x 3 1 cm focal area in the anterior subcutaneous soft tissue at the level of the thyroid cartilage superficial to the strap muscle with a small focus of air suggest a small fluid collection  I personally discussed this study with critical care PA taking care of this patient on 1/24/2019 at 3:37 PM  Workstation performed: [id number]     Ct Soft Tissue Neck    Result Date: 1/17/2019  Narrative: CT NECK WITH CONTRAST INDICATION:   Follow-up neck mass  COMPARISON:  12/24/2018  TECHNIQUE:  Axial, sagittal, and coronal 2D reformatted images were created from the axial source data and submitted for interpretation  Radiation dose length product (DLP) for this visit:  975 mGy-cm   This examination, like all CT scans performed in the [facility], was performed utilizing techniques to minimize radiation dose exposure, including the use of iterative reconstruction and automated exposure control  IV Contrast:  85 mL of iohexol (OMNIPAQUE) IMAGE QUALITY:  Diagnostic   FINDINGS: VISUALIZED BRAIN PARENCHYMA:  Normal enhancement of the visualized brain parenchyma  Visualized portions of the transverse and sigmoid sinuses are patent  VISUALIZED ORBITS AND PARANASAL SINUSES:  Normal  NASAL CAVITY AND NASOPHARYNX:  Normal  SUPRAHYOID NECK:  Normal oral cavity, tongue base, tonsillar fossa and epiglottis  INFRAHYOID NECK:  Heterogeneous enhancing mass centered in the left lobe of the thyroid is again identified measuring 55 mm anteroposterior by 47 mm mediolateral by 62 mm superoinferior  Mass likely also involves the isthmus and right lobe  Mass encases and infiltrates the esophagus extending into the superior mediastinum  Mass extends superiorly to the level of the arytenoid cartilage  Grossly intact thyroid and cricoid cartilage  PAROTID AND SUBMANDIBULAR GLANDS:  No inflammation or sialolithiasis  LYMPH NODES:  Left level 4 lymph node is increase in size, measuring 1 4 cm in short axis diameter compared to 1 3 cm on the prior exam numerous small rounded left level 2 and 3 lymph nodes measuring up to 1 3 cm in short axis diameter, also mildly increased in size  Right level 2A lymph node increase in size, measuring 1 2 cm in short axis diameter compared to 1 cm on the prior exam  VASCULAR STRUCTURES:  Normal enhancement of the cervical vasculature  THORACIC INLET:  Stable left to right displacement of the trachea with moderate narrowing at the C7 level  Mediastinal lymphadenopathy  Pulmonary nodules in the visualized upper lungs are increased in size  Left apical pleural/subpleural metastasis measures 3 7 cm compared to 2 8 cm on the prior exam   Infrahilar 1 4 cm lesion in the right upper lobe, incompletely visualized on the prior neck CT  Left superior segment 1 7 cm nodule, previously measuring 1 2 cm  Small left pleural effusion  Right chest port  Catheter in the SVC  BONY STRUCTURES: No evidence of lytic or blastic lesion  Impression: 1    Increasing size of left neck mass centered in the left lobe of the 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  Increased rightward shift of the trachea with at least moderate narrowing  2   Mildly increased size of left level 2, 3 and 4 and right level 2 lymph nodes  3   Stable encasement and infiltration of the upper esophagus  4   Partially visualized pulmonary metastases, also increased in size  Workstation performed: [id number]     Ct Chest Wo Contrast    Result Date: 1/24/2019  Narrative: CT CHEST WITHOUT IV CONTRAST INDICATION:   thyroid tumor  COMPARISON:  December 24, 2018 TECHNIQUE: CT examination of the chest was performed without intravenous contrast   Axial, sagittal, and coronal 2D reformatted images were created from the source data and submitted for interpretation  Radiation dose length product (DLP) for this visit:  926 97 mGy-cm   This examination, like all CT scans performed in the [facility], was performed utilizing techniques to minimize radiation dose exposure, including the use of iterative  reconstruction and automated exposure control  FINDINGS: LUNGS:  Left upper lobe mass seen which has become larger from the previous cyst study  This measures 2 6 x 3 5 cm x 3 7 cm  On the previous study this was measuring 2 2 x 2 8 x 1 3 cm  There are new pleural-based nodules in the left upper lobe, seen in image 16 in the  These are measuring 1 7 cm there are enlarging nodules in the left upper lobe in the medial aspect of the left lung in image 24 measuring about 1 7 cm there is development of a area of consolidation in the left lower lobe with some volume loss  There is  associated left effusion  Atelectasis right lower lobe seen with small right effusion  A nodule seen in the posterior aspect of the right upper lobe now measures 1 4 cm  On the previous study this was measuring about 4 9 mm  There is an additional nodule in the anterior aspect of the right upper lobe which measures about 4 8 mm    On the previous study this was measuring about 1 6 mm nodule seen in the right middle lobe in image 41 series 4 has enlarged A nodule is also seen in the right lower lobe in image 45 series 4  This is larger PLEURA:  Small bilateral effusion are seen Pleuroparenchymal mass in the left upper lobe with new pleuroparenchymal left apical nodule HEART/GREAT VESSELS:  Unremarkable for patient's age  MEDIASTINUM AND ADRI:  Small subcarinal lymph nodes, do not meet the criteria for pathologic CHEST WALL AND LOWER NECK:   There is a mass in the left thyroid lobe which infiltrates  There is no significant axillary lymph node enlargement seen VISUALIZED STRUCTURES IN THE UPPER ABDOMEN:  Spleen, pancreas, adrenals appear unremarkable Cholelithiasis seen OSSEOUS STRUCTURES: Bony metastatic disease noted with the lytic lesion in the T12 vertebra, T9 vertebra Bony metastatic disease seen in the C7 vertebra    Impression:  IMPRESSION:  There is increase in the size of previously noted nodules, increase in the size of the left apical mass with the appearance of new nodular densities in the subpleural/pleuroparenchymal location in the left apex  These are also seen on the previous CTof the neck from January 17, 2019 There are bibasilar consolidations left greater than right may represent atelectasis, however infiltrates are difficult to exclude Bony metastatic disease as seen on the previous PET scan Progression of distal metastatic disease The study was marked in EPIC for significant notification  Workstation performed: [id number]     Nm Cardiac Blood Pool Muga (at Rest)    Result Date: 1/17/2019  Narrative: MUGA SCAN INDICATION: C73: Malignant neoplasm of thyroid gland COMPARISON: None available TECHNIQUE:   The study was performed following in vivo labeling utilizing 26 0  mCi Tc-99m pertechnetate IV  Gated images of the heart were acquired in the anterior, Upper sorbian and steep Upper sorbian projections  FINDINGS: Right ventricular motion is unremarkable   There is normal symmetrical contractility of the left ventricle  The overall resting left ventricular ejection fraction is 70 %, based on automatic calculation  Impression: The resting left ventricular ejection fraction is 70%  Workstation performed: [id number]     X-ray Chest 1 View    Result Date: 1/18/2019  Narrative: CHEST INDICATION:   Endotracheal tube positioning  COMPARISON:  12/24/2018  EXAM PERFORMED/VIEWS:  XR CHEST 1 VIEW FINDINGS:  Endotracheal tube tip is above the level of the clavicular heads, and should be advanced at least 5 cm into the trachea  Enterogastric tube enters the stomach, however tip is not included on the film  Right-sided chest port terminates in the  right atrium  Cardiomediastinal silhouette appears unremarkable  Increased consolidation at the medial right lung base related to pneumonia or atelectasis  There is also increased left basilar retrocardiac opacity consistent with small effusion and underlying consolidation due to pneumonia or atelectasis  Osseous structures appear within normal limits for patient age  Impression: Endotracheal tube tip at the level of the thoracic inlet, and should be advanced 5 cm into the trachea  Left basilar opacity consistent with small pleural effusion and consolidation due to atelectasis or pneumonia  Mild right basilar consolidation also noted  The study was marked in [facility] for immediate notification  Workstation performed: [id number]     Nm Pet Ct Skull Base To Mid Thigh    Result Date: 1/8/2019  Narrative: WHOLE-BODY PET/CT SCAN INDICATION: C73:  Malignant neoplasm of thyroid gland  , 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, initial staging for treatment management, PEG tube in place   MODIFIER: PI COMPARISON: CT 12/24/2018 and priors CELL TYPE:  Undifferentiated carcinoma, left thyroid biopsy 12/24/2018 TECHNIQUE: Following the intravenous administration of 8 3 mCi F-18-FDG, dedicated head and neck images were obtained from the skull vertex to the thoracic inlet with the patient's arms at their side  Subsequently, whole body images were obtained from the thoracic inlet through the proximal femurs with the patients arms above their head  Multiplanar attenuation corrected and non attenuation corrected PET images are available for interpretation  Contiguous, low dose, axial CT sections were also obtained from the head through the thoracic inlet and from the thoracic inlet through the proximal femurs  Intravenous contrast material was not utilized  Additional coronal and sagittal images are available as well as fused PET/CT images  This examination, like all CT scans performed in the [facility], was performed utilizing techniques to minimize radiation dose exposure, including the use of iterative reconstruction and automated exposure control  Fasting serum glucose: 125 mg/dl FINDINGS: VISUALIZED BRAIN: No acute abnormalities are seen  HEAD/NECK:   Large confluent thyroid mass demonstrates intense FDG activity, SUV 31 8, compatible with known malignancy  There is invasion into the soft tissues, as well as bilateral cervical adenopathy, and mass effect on the upper trachea  A hypermetabolic focus is present medial to the left mandibular ramus, SUV 23 3  Example right upper cervical node above the hyoid, series 3H image 47, measures 1 2 x 1 3 cm, SUV 33 7  Example left upper cervical node at the same level measures 1 4 x 1 2 cm, SUV 22 1  There  is likely direct invasion into the esophagus  There is also invasion into the posterior cervicothoracic junction, in particular the C7 level, with associated osseous destruction  Tumor also extends into the superior mediastinum  CT images:  As above  CHEST: Extension of the hypermetabolic thyroid mass into the superior mediastinum  Multiple bilateral hypermetabolic lung nodules compatible with metastases  Multiple left pleural-based metastatic lesions are also present   Example left apical pleural-based lesion series 3 image 34 measures approximately 1 8 x 3 6 cm, SUV 29  Nodule in the superior left lower lung image 53 measures 1 4 x 1 5 cm, SUV 13 5  Left perihilar lesion SUV 5 4  Left lung base posterior pleural-based lesion SUV 11 5  Left posterior medial probable pleural-based lesion at the level of the left 12th rib has an SUV of 15 4  Right upper posterior lung nodule image 55 measures 9 x 9 mm, SUV 2 9  Additional lesions are present  Left pericardial nodule/node image 80 measures 1 1 x 1 6 cm, SUV 20 9  CT images: Minimal coronary atherosclerosis  Small left effusion  ABDOMEN:   There is a hypermetabolic right liver lesion, SUV 14 3, compatible with a metastasis  This is difficult to visualize on the noncontrast CT images, but measures approximately 2 x 2 7 cm based on the extent of FDG activity  Lesion in the left lateral abdominal wall between the left lateral 8th and 9th ribs measures approximately 1 3 x 2 9 cm, SUV 39 4  Lesion deep to the left anterior 7th rib at the level of the diaphragm has an SUV of 18 5  CT images: Nonobstructing left renal calculi measuring up to 9 mm  Small renal cysts  Probable gallbladder sludge  PEG tube in place  PELVIS: Possible small hypermetabolic metastasis in the right gluteal musculature, SUV 2 9  CT images: Bladder wall thickening may be exacerbated by under distention  Bilateral hydroceles  OSSEOUS STRUCTURES:  Invasion by the primary thyroid mass into the cervicothoracic junction, in particular the C7 level, as above, with an SUV of 11 4  Multiple additional osseous metastases are present in the thoracolumbar spine, sacrum, left humerus, pelvis, left scapula, ribs  Example T9 lesion SUV 10 7  Right T12 lesion SUV 33 2  Left scapular lesion SUV 16 6  Proximal left humerus lesion SUV 24 3  Left posterior iliac lesion SUV 36  Small left sacral lesion SUV 4 7  CT images: Otherwise unremarkable  Impression: 1    Large hypermetabolic thyroid mass with invasion into the adjacent soft tissues, including esophagus, and cervicothoracic junction, in particular C7 level, with associated osseous destruction, compatible with known malignancy  Bilateral hypermetabolic cervical adenopathy  2   Multiple hypermetabolic lung nodules and pleural-based lesions compatible with metastases  3   Right liver hypermetabolic metastasis  Better determination of size of liver lesion may be obtained with contrast CT or MR  4   Multiple osseous metastases as above  Workstation performed: [id number]     Ir Port Placement    Result Date: 1/17/2019  Narrative: EXAMINATION: Port placement INDICATION: 29-year-old male with newly diagnosed metastatic anaplastic thyroid carcinoma will be receiving chemotherapy and presents for port placement  CONTRAST: None FLUOROSCOPY TIME:   0 6 min IMAGES:  5 ANESTHESIA: Moderate sedation and local lidocaine PROCEDURE:  The patient was identified verbally and by wristband  Timeout was performed  Informed consent was obtained  Following obtaining informed consent, the patient was prepped and draped in the usual sterile fashion  All elements of maximal sterile barrier technique, cap and mask and sterile gown and sterile gloves and sterile full-body drape and hand hygiene and 2% chlorhexidine for cutaneous antisepsis  Sterile ultrasound technique with sterile gel and sterile probe covers was also utilized  1% local lidocaine with epinephrine was infiltrated into the skin and subcutaneous tissues overlying the right base of neck  Under ultrasound guidance, a micropuncture needle was used to puncture the right IJV  Access was secured using a 5-Faroese transitional sheath  A microwire was used to measure the length needed for port catheter  Local anesthetic was administered into the right chest wall  A 3 cm incision was made and port pocket developed using blunt dissection    A Everlaterp Dignity single-lumen port with 8-Faroese catheter was chosen for placement  The port was found to fit snugly within the port pocket  The port catheter was tunneled in the subcutaneous tissues and brought out at the right IJV puncture site  A J-wire was advanced through the transitional sheath into the IVC  Transitional sheath was exchanged for a peel-away sheath  The port catheter was advanced through the peel-away sheath, followed by removal of the sheath  Final positioning of the port  was verified under fluoroscopy  Port was found to aspirate and flush easily  The port pocket subcutaneous tissues in the right IJV puncture site were approximated using 3-0 Vicryl sutures  The port pocket skin was closed using 4-0 Monocryl suture  Histoacryl glue and sterile dressing were applied  The patient tolerated the procedure well without complication  The patient left the IR department in stable condition  Findings: 1  Using ultrasound guidance, the right IJ vein was cannulated using seldinger technique  The right IJ vein was evaluated as a potential access site  The right IJ vein was patent, and free of thrombus  Static images of the vessel was obtained  Visualization of real time needle entry into the vessel was obtained  2   Successful placement of right chest port through right IJV access using Medicalodgesp Dignity single-lumen port with 8-Japanese catheter  Impression: Impression: Successful placement of right chest port through right IJV access  Workstation performed: [id number]         Assessment and Plan : This is a very unfortunate 21-year-old male with newly diagnosed very aggressive anaplastic thyroid cancer  He was started on chemotherapy along with radiation concurrently in an effort to stabilize his airway  He has not had a very good response  His disease continues to progress  I had a discussion with the patient's family the patient himself and Dr Yobani Olmstead about the poor prognosis from his disease    They are considering extubation and comfort measures  I think this is reasonable  Palliative Care is on board  Please call with any questions

## 2019-01-25 NOTE — PROGRESS NOTES
Progress note - Palliative and Supportive Care   Greg Shore 59 y o  male [id number]    Assessment:  Patient Active Problem List   Diagnosis    Oropharyngeal dysphagia    Type 2 diabetes mellitus without complication, without long-term current use of insulin ([facility] [address] [facility]    Essential hypertension    Morbid obesity due to excess calories ([facility] [address] [facility]    Gastroesophageal reflux disease without esophagitis    Anaplastic thyroid carcinoma with metastases    Difficulty in swallowing    Elevated TSH    Accelerated hypertension    Tracheal obstruction    CKD (chronic kidney disease) stage 3, GFR 30-59 ml/min (HCC)    Thyroid cancer ([facility] [address] [facility]    Airway obstruction       Plan:  1  Goals of care   - level 4 comfort care   - family meeting took place at bedside  Wife Shalonda Medina), Son Lakisha Couch), Daughter Flynn Cotto)- present over the phone, and brother  Dr Tone Muse, Dr Clifford Ivan also present  - reviewed aggressive nature of anaplastic thyroid cancer which has progressed despite chemotherapy and radiation therapy (confirmed by oncology and radiation oncology teams)  - Advanced airway high risk per ENT  - patient alert and clearly expresses desire for compassionate extubation tonight     - Discussed intent to maximize comfort with intent for patient to be as alert as possible  However, discussed possibility of patient requiring sedating doses of medications to achieve comfort especially in the setting of high obstruction risk, described as palliative sedation  This option will only be reserved as necessary and will pursue routine use of morphine and precedex gtt in addition to PRN morphine, ativan, and robinul prior to pursuit of versed   However, this will be available to ensure comfort in the setting of persistent air hunger     - Patient and family express understanding that once ETT removed patient's prognosis may be limited to minutes but could potentially extend to hours-days in which case discussion regarding hospice IPU will take place  2  Symptom management as discussed with Dr Scottie Ace and Dr Sara Sun   - continue precedex gtt at 0 7mcg/kg/hr   - initiate morphine gtt at 2mg/hr (titrate up as needed)   - morphine 10mg prior to extubation and morhine 4mg IV Q10 minutes PRN   - ativan 1mg prior to extubation and Q1H PRN   - versed 5mg Q10 minutes PRN air hunger --> may consider versed gtt if patient requires 2 subsequent doses   - robinul 0 2mg Q6H [facility]   - call palliative care service for ongoing comfort needs/support     Code Status: comfort - Level 4   Decisional apparatus:  Patient is competent on my exam today  If competence is lost, patient's substitute decision maker would default to wife, Antolin Meyer, by [place] Act 169  Advance Directive / Living Will / POLST:  None on file  I have reviewed the patient's controlled substance dispensing history in the Prescription Drug Monitoring Program in compliance with the [facility] regulations before prescribing any controlled substances  Interval history:       Family discussion as above, patient has clearly, consistently expressed wishes to remove ETT  He is clear that he does not desire to wait for any additional family  He expresses understanding of plan to maximize comfort with additional medications as needed and accepts the risk of sedation as a secondary effect       MEDICATIONS / ALLERGIES:     all current active meds have been reviewed and current meds:   Current Facility-Administered Medications   Medication Dose Route Frequency    dexamethasone (DECADRON) injection 10 mg  10 mg Intravenous Once    dexmedetomidine (PRECEDEX) 200 mcg in sodium chloride 0 9 % 50 mL infusion  0 1-0 7 mcg/kg/hr Intravenous Titrated    glycopyrrolate (ROBINUL) injection 0 2 mg  0 2 mg Intravenous Q6H [facility]    LORazepam (ATIVAN) 2 mg/mL injection 1 mg  1 mg Intravenous Once    LORazepam (ATIVAN) 2 mg/mL injection 1 mg  1 mg Intravenous Q1H PRN    midazolam (VERSED) injection 10 mg  10 mg Intravenous Once    morphine (PF) 10 mg/mL injection 10 mg  10 mg Intravenous Once PRN    morphine (PF) 4 mg/mL injection 4 mg  4 mg Intravenous Q10 Min PRN    morphine 250 mg in sodium chloride 0 9% 50mL drip  2 mg/hr Intravenous Continuous    morphine injection 2 mg  2 mg Intravenous Once       No Known Allergies    OBJECTIVE:    Physical Exam  Physical Exam   Constitutional: He appears well-developed  HENT:   Head: Normocephalic and atraumatic  Eyes: Conjunctivae are normal    Neck:   Anterior incision from tracheostomy attempt CDI   Cardiovascular: Normal rate  Pulmonary/Chest:   Intubated, no respiratory distress   Abdominal: There is no guarding  Musculoskeletal: He exhibits no edema  Neurological: He is alert  Communicates very clearly with writing and hand gestures   Skin: Skin is warm and dry  Psychiatric:   Appears to appropriate mood and affect       Lab Results:   I have personally reviewed pertinent labs  , CBC:   Lab Results   Component Value Date    WBC 6 29 01/25/2019    HGB 10 3 (L) 01/25/2019    HCT 30 5 (L) 01/25/2019    MCV 92 01/25/2019     01/25/2019    MCH 31 2 01/25/2019    MCHC 33 8 01/25/2019    RDW 12 7 01/25/2019    MPV 9 9 01/25/2019    NRBC 0 01/25/2019   , CMP:   Lab Results   Component Value Date    SODIUM 137 01/25/2019    K 4 1 01/25/2019     01/25/2019    CO2 29 01/25/2019    BUN 27 (H) 01/25/2019    CREATININE 0 92 01/25/2019    CALCIUM 8 2 (L) 01/25/2019    EGFR 89 01/25/2019     Imaging Studies:   CT soft tissue neck 1/24: Heterogenous mass predominantly involving the left thyroid lobe with retrosternal extension and likely invasion into the esophagus  Postradiation this mass demonstrates surrounding infiltration, edema and mild enlargement  On the current study this   mass measures 5 8 x 5 9 cm    And on the previous study this was measuring 5 3 x 5 4 cm the enlargement of this mass on the current study likely due to postradiation changes due to edema and inflammatory changes      4 7 cm x 3 1 cm focal area in the anterior subcutaneous soft tissue at the level of the thyroid cartilage superficial to the strap muscle with a small focus of air suggest a small fluid collection    CT chest 1/24: There is increase in the size of previously noted nodules, increase in the size of the left apical mass with the appearance of new nodular densities in the subpleural/pleuroparenchymal location in the left apex  These are also seen on the previous CTof   the neck from January 17, 2019     There are bibasilar consolidations left greater than right may represent atelectasis, however infiltrates are difficult to exclude     Bony metastatic disease as seen on the previous PET scan    EKG, Pathology, and Other Studies:   Surgical pathology consistent with anaplastic thyroid CA    Counseling / Coordination of Care  Total floor / unit time spent today 60+ minutes  Greater than 50% of total time was spent with the patient and / or family counseling and / or coordination of care  A description of the counseling / coordination of care: time spent with patient, family, communication with critical care team, and nursing staff

## 2019-01-25 NOTE — PROGRESS NOTES
Critical Care Attending Note    Held goals of care meeting with the patient, his wife, son, and daughter (on telephone)  Also present was Antonietta Pena RN, ROSALINE Segovia, and [employer] ([facility]) and Dr Annette Whalen ([facility])  I reviewed the current state of his disease with continued anaplastic thyroid  tumor growth despite chemo/XRT for the past week  We reviewed that further treatment or waiting would result in any tumor reduction - as discussed with Dr Chuck Pallas (oncology) earlier today  We discussed further options including transition to comfort measures and with extubation  We discussed concerns for airway obstruction and unclear respiratory status once extubated  He reported he would desire extubation as soon as possible  We discussed in detail plans for extubation and palliative medication strategies with goal to minimize dyspnea but allow interaction with family and friends as long as possible  We also discussed should severe airway obstruction be present with respiratory distress, same medications would be used to alleviate air hunger, anxiety and distress but would likely not allow family interactions due to significant sedation  He and his family demonstrated understanding  All questions were answered  We discussed timing of extubation and he reported he desired this to be done tonight and as soon as possible, not waiting on further family members or visitors   was offered and declined  Plan for terminal extubation  With palliative care plan as follows:  · Start morphine gtt 2mg/hr and dose 2mg prior to extubation  · Ativan prn for anxiety  · Continue precedex gtt  · Prn boluses of morphine and versed available at bedside and should significant respiratory distress occur, start versed gtt  · glyopyrolate for secretions  · Decadron 10mg x 1  · Comfort care to bedside    I appreciate [facility] support in the care of this patient and family      Additional critical care time excluding procedures and teaching is 30 minutes      Cecile Console, DO

## 2019-01-26 NOTE — DEATH NOTE
Called to bedside by Rosalina Cooley RN  Patient was identified visually and identification was confirmed with ID bracelet  All lines and tubes were intact  Patient was unresponsive to all stimuli  No spontaneous respirations  No palpable carotid, radial, or femoral pulse  No heart sounds on auscultation  Pupils fixed and dilated bilaterally  No corneal or gag reflex  Asystole noted in all leads  Time of death called at 80  Wife was previously notified by RN and is in route to hospital   Patient was comfort care at the time of his death   has been notified

## 2019-01-26 NOTE — DISCHARGE SUMMARY
Critical Care Discharge Note  Fernandez Vázquez 59 y o  male MRN: [id number]  Unit/Bed#: MICU 04 Encounter: [id number]    Code Status: Level 4 - Comfort Care    Reason for ICU Admission:  Upper airway obstruction secondary to anaplastic thyroid carcinoma    History of Present Illness:  Briefly, patient is a 81-year-old male with past medical history of diabetes mellitus not on insulin, hypertension, GERD, and recently diagnosed anaplastic thyroid carcinoma with pulmonary, osseous, and liver metastases  Anaplastic thyroid carcinoma was initially diagnosed in December 2018  Patient presented to this facility as a transfer from [facility]  He presented to [facility] on January 18th with globus sensation and difficulty breathing  Symptoms were concerning for impending upper airway obstruction  He was taken to the OR by ENT where a tracheostomy was attempted  However, the patient's trachea was encased with tumor and tracheostomy was not feasible  The patient was left intubated and transferred to this facility for radiation therapy  Clinical Course:  Patient was admitted to the ICU  He remained intubated  He initially was deeply sedated but sedation was weaned and his mental status progressively improved  The patient eventually was fully alert, awake, and able to communicate by writing  Patient was evaluated by ENT, Medical Oncology, and Radiation Oncology  He was started on chemotherapy and radiation therapy with the hope that his tumor burden might be reduced and a tracheostomy might then be feasible  Unfortunately, the patient did not have a good response and his disease progressed as evidenced by repeat CT of the neck and chest performed on January 24th  This was discussed with the patient and his wife  A family meeting was held with palliative care on January 25th  During this meeting, the patient clearly expressed his desire for a transitioned to comfort care    The patient was transitioned to comfort care on the evening of January 25th  He was provided with medications to manage his symptoms  He gradually became hypoxic and eventually asystolic  Time of death was called at [phone]  Please see separate note  Wife was notified      Consultants:  Medical Oncology, Radiation Oncology, ENT, Palliative Care    Results of Diagnostic Tests:     1/17 CT soft tissue neck with contrast:  Increasing size of left neck mass centered in the left lobe of the 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  Increased worker shift of the trachea with moderate narrowing  Mildly increased lymphadenopathy  Stable encasement and infiltration of the upper esophagus by tumor  Partially visualized pulmonary metastases, increased in size  1/24 CT soft tissue neck without contrast:  Heterogeneous mass involving the left thyroid lobe with retrosternal extension and likely invasion into the esophagus  Masses enlarged relative to previous study, 5 8 x 5 9 cm versus 5 3 x 5 9 cm   4 7 x 3 1 cm focal area in the anterior subcutaneous soft tissue at the level of thyroid cartilage superficial to the strap muscle with a small focus of air suggesting a small fluid collection  1/24 CT chest without contrast:  Increase in size of previously noted nodules, increase in size of the left apical mass with the appearance of new nodular densities in the subpleural/pleural parenchymal region in the left apex  Bibasilar consolidations, left greater than right  Osseous metastases as seen on previous PET-CT  Recent or Scheduled Procedures:     1/18 attempted tracheostomy by ENT, aborted due to tumor encasing trachea                                                                                             Portions of the record may have been created with voice recognition software    Occasional wrong word or "sound a like" substitutions may have occurred due to the inherent limitations of voice recognition software  Read the chart carefully and recognize, using context, where substitutions have occurred

## 2021-03-02 NOTE — UTILIZATION REVIEW
Initial Clinical Review    Admission: Date/Time/Statement: 1/17/19 @ 2215   Orders Placed This Encounter   Procedures    Inpatient Admission (expected length of stay for this patient is greater than two midnights)     Standing Status:   Standing     Number of Occurrences:   1     Order Specific Question:   Admitting Physician     Answer:   Derrick Nelson [[id number]]     Order Specific Question:   Level of Care     Answer:   Level 1 Stepdown [13]     Order Specific Question:   Estimated length of stay     Answer:   More than 2 Midnights     Order Specific Question:   Certification     Answer:   I certify that inpatient services are medically necessary for this patient for a duration of greater than two midnights  See H&P and MD Progress Notes for additional information about the patient's course of treatment  ED: Date/Time/Mode of Arrival:   ED Arrival Information     Expected Arrival Acuity Means of Arrival Escorted By Service Admission Type    - 1/17/2019 19:00 Emergent [facility]In [facility] Hospitalist Emergency    Arrival Complaint    shortness of breath        Chief Complaint:   Chief Complaint   Patient presents with    Airway Obstruction     Presents to ED with hx of thyroid cancer  Reports went to testing today, took a nap and woke up feeling like his thyroid was swelling and obstructing his airway  History of Illness: Aditi Guillen is a 59 y o  male who presents with c/o shortness of breath, states his throat felt tight, was wheezing and could not catch his breath  Has these episodes occasionally but this was much worse, often finds himself having to sit and catch his breath  S/p peg, states he eats liquids or fine foods, states food is thinner than mash potato consistency  Denies chest pain, palpitations, abdominal pain, nausea, emesis, diarrhea or dysuria      Patient irritable, providing limited history, is upset because he feels he is not getting his cancer treatment soon enough          ED Vital Signs:   ED Triage Vitals [01/17/19 1908]   Temperature Pulse Respirations Blood Pressure SpO2   98 °F (36 7 °C) 89 (!) 26 168/92 94 %      Temp Source Heart Rate Source Patient Position - Orthostatic VS BP Location FiO2 (%)   Oral Monitor Lying Left arm --      Pain Score       No Pain        Wt Readings from Last 1 Encounters:   01/18/19 106 kg (233 lb 7 5 oz)     Vital Signs (abnormal): Above    Pertinent Labs/Diagnostic Test Results: Total  Bili   1 19  Bili, direct   0 37  Albumin   3 3  Chloride   96  Creat   1 38  AG  15  WBC  10 68  Abs neutro    8 11  Ct  Soft tissue  Of  Neck:     Increasing size of left neck mass centered in the left lobe of the 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    Increased rightward shift of the trachea with at least moderate narrowing  2  Delon Furth increased size of left level 2, 3 and 4 and right level 2 lymph nodes  3   Stable encasement and infiltration of the upper esophagus  4   Partially visualized pulmonary metastases, also increased in size  ED Treatment:   Medication Administration from 01/17/2019 1900 to 01/18/2019 3583       Date/Time Order Dose Route Action Action by Comments     01/17/2019 2216 sodium chloride 0 9 % bolus 1,000 mL 0 mL Intravenous Stopped Mila Manzo RN      01/17/2019 1934 sodium chloride 0 9 % bolus 1,000 mL 1,000 mL Intravenous Larrytariella 37 Mila Manzo RN      01/17/2019 2034 iohexol (OMNIPAQUE) 350 MG/ML injection (MULTI-DOSE) 85 mL 85 mL Intravenous Given Martine Palomino      01/17/2019 2216 dexamethasone (DECADRON) injection 10 mg 10 mg Intravenous Given Mila Manzo RN         Past Medical/Surgical History:    Active Ambulatory Problems     Diagnosis Date Noted    Oropharyngeal dysphagia 12/27/2018    Type 2 diabetes mellitus without complication, without long-term current use of insulin ([address] ) 12/27/2018    Essential hypertension 12/27/2018    Morbid obesity due to excess calories ([address] ) 12/27/2018    Gastroesophageal reflux disease without esophagitis 12/27/2018    Primary cancer of thyroid with metastasis to other site [facility]) 12/27/2018    Difficulty in swallowing 12/27/2018     Resolved Ambulatory Problems     Diagnosis Date Noted    No Resolved Ambulatory Problems     Past Medical History:   Diagnosis Date    Cancer ([facility] )     Diabetes mellitus ([facility] )     GERD (gastroesophageal reflux disease)     Hypertension      Admitting Diagnosis: Thyroid cancer ([place] [address] ) [C73]  SOB (shortness of breath) [R06 02]  Tracheal obstruction [J39 8]     Assessment/Plan:   Tracheal obstruction   Assessment & Plan     · Seen on prior CT in December although has increased in size  · CT of neck shows increasing size of left neck mass centered in the left lobe of the 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    Increased rightward shift of the trachea with at least moderate narrowing  Stable encasement and infiltration of the upper esophagus  · Reported improvement with steroids, will continue Solu-Medrol 40 mg TID  · NPO, IVF  · O2 sat monitoring  · ENT consult  ? 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    · S/p Successful placement of right chest port through right IJV access 1/17/19     Oropharyngeal dysphagia   Assessment & Plan     · S/p PEG tube on 12/28/18  · Crush meds and give via PEG  · Aspiration precautions      CKD (chronic kidney disease) stage 3, GFR 30-59 ml/min ([facility])   Assessment & Plan     · Creatinine 1 3, at baseline  · Avoid NSAIDs and nephrotoxins  · Monitor BMP  · Outpatient follow-up with Nephrology      Elevated TSH   Assessment & Plan     · TSH 99; metastatic anaplastic thyroid carcinoma      Gastroesophageal reflux disease without esophagitis   Assessment & Plan     · IV PPI qd   Type 2 diabetes mellitus without complication, without long-term current use of insulin ([facility] [address] )  Anticipated Length of Stay:  Patient will be admitted on an Inpatient basis with an anticipated length of stay of  > 2 midnights     Justification for Hospital Stay:  Airway obstruction    Admission Orders:   IP  1/17  @    3928  Scheduled Meds:   Current Facility-Administered Medications:  acetaminophen 650 mg Oral Q4H PRN JETHRO Ramos    lisinopril 20 mg Oral Daily JETHRO Ramos    And        amLODIPine 5 mg Oral Daily JETHRO Ramos    [START ON 1/20/2019] cloNIDine 1 patch Transdermal Weekly JETHRO Ramos    cyanocobalamin 500 mcg Per PEG Tube Daily JETHRO Ramos    heparin (porcine) 5,000 Units Subcutaneous [facility] JETHRO Ramos    insulin lispro 1-5 Units Subcutaneous HS JETHRO Ramos    insulin lispro 1-6 Units Subcutaneous TID AC JETHRO Ramos    methylPREDNISolone sodium succinate 40 mg Intravenous Q8H JETHRO Ramos    metoprolol tartrate 100 mg Per PEG Tube Q12H [facility] JETHRO Ramos    ondansetron 4 mg Intravenous Q6H PRN JETHRO Ramos    pantoprazole 40 mg Intravenous Q24H [facility] JETHRO Ramos    sodium chloride 80 mL/hr Intravenous Continuous JETHRO Ramos Last Rate: 80 mL/hr (01/18/19 0152)     Continuous Infusions:   sodium chloride 80 mL/hr Last Rate: 80 mL/hr (01/18/19 0152)     PRN Meds:   acetaminophen    ondansetron     NPO  Aspiration precautions  O2  3L    IR PORT PLACEMENT  Procedure Note    1/17  Findings: Successful placement of right chest port through right IJV access            Per ENT  Consult:  Metastatic anaplastic thyroid carcinoma  2  Left vocal cord paralysis and right vocal cord paresis  3  Upper airway obstruction  Plan:  After thorough discussion with Triny Leyva at the bedside we have decided to proceed with a tracheostomy today in the operating room to secure his airway  He is aware of the aggressive nature of his tumor and despite the fact that he is scheduled to have radiation treatment in the near future is quite possible that he could have further airway compromise throughout the weekend which is not an ideal situation  He is fearful of losing his airway and would like to have the tracheostomy placed  We did a thorough discussion of the significant risks involved in this since I will mass likely be going through tumor in order to place the tracheostomy  Risks that were discussed at the bedside with Dr Gisella Nava present include but were not limited to:  Bleeding, infection, airway obstruction and death, pneumothorax, local tumor spread with obstruction of the airway, delayed airway obstruction secondary to tumor growth  He understands and is willing to accept all the risks associated with the surgery  This will be done later this morning  Consent was signed and charted  Reason for Consult / Principal Problem:  Difficulty breathing and shortness of breath  HPI: Pa Acharya is a 59y o  year old male who presents with difficulty breathing and shortness of breath  He was diagnosed with metastatic anaplastic thyroid carcinoma approximately 2-3 weeks ago  Despite working on this very quickly and arranging for him to have radiation he is still not yet begun the therapy  Unfortunately last night he had some difficulty breathing and presented to the ER where he was evaluated and taken to the ICU for airway monitoring  We were called for evaluation and stated that he should be left NPO and with supplemental oxygen    He was also given IV steroids which has helped over the last few hours  At the bedside now he is not acutely short of breath but it is certainly not at his baseline      [address] Utilization Review Department  Phone: [phone]; Fax [phone]  [email or web address]  ATTENTION: Please call with any questions or concerns to [phone]  and carefully listen to the prompts so that you are directed to the right person  Send all requests for admission clinical reviews, approved or denied determinations and any other requests to fax [phone]   All voicemails are confidential        IV discontinued, cath removed intact

## (undated) DEVICE — THYROID SHEET: Brand: CONVERTORS

## (undated) DEVICE — SPONGE STICK WITH PVP-I: Brand: KENDALL

## (undated) DEVICE — ELECTRODE NEEDLE MOD E-Z CLEAN 2.75IN 7CM -0013M

## (undated) DEVICE — SUT SILK 2-0 30 IN A305H

## (undated) DEVICE — TUBING SUCTION 5MM X 12 FT

## (undated) DEVICE — TRACH TUBE HOLDER

## (undated) DEVICE — AIRLIFE™ TRI-FLO™ SUCTION CATHETER WITH CONTROL PORT: Brand: AIRLIFE™

## (undated) DEVICE — ELECTRODE BLADE MOD E-Z CLEAN  2.75IN 7CM -0012AM

## (undated) DEVICE — ALL PURPOSE SPONGES,NON-WOVEN, 3 PLY: Brand: CURITY

## (undated) DEVICE — SYRINGE 10ML LL CONTROL TOP

## (undated) DEVICE — ADULT FLEXIBLE TRACHEOSTOMY TUBE WITH TAPERGUARD CUFF DISPOSABLE INNER CANNULA: Brand: SHILEY

## (undated) DEVICE — 2000CC GUARDIAN II: Brand: GUARDIAN

## (undated) DEVICE — GLOVE SRG BIOGEL ORTHOPEDIC 7

## (undated) DEVICE — GLOVE INDICATOR PI UNDERGLOVE SZ 7.5 BLUE

## (undated) DEVICE — SUT VICRYL 4-0 SH 27 IN J415H

## (undated) DEVICE — SUT SILK 2-0 SH 30 IN K833H

## (undated) DEVICE — PENCIL ELECTROSURG E-Z CLEAN -0035H

## (undated) DEVICE — GLOVE SRG BIOGEL ECLIPSE 7.5

## (undated) DEVICE — GLOVE SRG BIOGEL ECLIPSE 7

## (undated) DEVICE — PROXIMATE SKIN STAPLERS (35 WIDE) CONTAINS 35 STAINLESS STEEL STAPLES (FIXED HEAD): Brand: PROXIMATE

## (undated) DEVICE — X-RAY DETECTABLE SPONGES,16 PLY: Brand: VISTEC

## (undated) DEVICE — PERCUTANEOUS ENDOSCOPIC GASTROSTOMY KIT: Brand: ENDOVIVE SAFETY PEG KIT

## (undated) DEVICE — ADAPTOR BRONCHO

## (undated) DEVICE — SUT CHROMIC 2-0 CT-1 27 IN 811H

## (undated) DEVICE — INTENDED FOR TISSUE SEPARATION, AND OTHER PROCEDURES THAT REQUIRE A SHARP SURGICAL BLADE TO PUNCTURE OR CUT.: Brand: BARD-PARKER SAFETY BLADES SIZE 15, STERILE

## (undated) DEVICE — SURGICEL 2 X 14

## (undated) DEVICE — BETHLEHEM UNIVERSAL MINOR GEN: Brand: CARDINAL HEALTH

## (undated) DEVICE — GLOVE SRG BIOGEL 7

## (undated) DEVICE — HARMONIC FOCUS SHEARS 9CM LENGTH + ADAPTIVE TISSUE TECHNOLOGY FOR USE WITH BLUE HAND PIECE ONLY: Brand: HARMONIC FOCUS

## (undated) DEVICE — 10FR FRAZIER SUCTION HANDLE: Brand: CARDINAL HEALTH

## (undated) DEVICE — NEEDLE 25G X 1 1/2

## (undated) DEVICE — 3M™ TEGADERM™ TRANSPARENT FILM DRESSING FRAME STYLE, 1624W, 2-3/8 IN X 2-3/4 IN (6 CM X 7 CM), 100/CT 4CT/CASE: Brand: 3M™ TEGADERM™

## (undated) DEVICE — SUT SILK 0 SH 30 IN K834H

## (undated) DEVICE — DRAPE EQUIPMENT RF WAND